# Patient Record
Sex: MALE | Race: BLACK OR AFRICAN AMERICAN | Employment: UNEMPLOYED | URBAN - METROPOLITAN AREA
[De-identification: names, ages, dates, MRNs, and addresses within clinical notes are randomized per-mention and may not be internally consistent; named-entity substitution may affect disease eponyms.]

---

## 2017-01-11 DIAGNOSIS — J00 ACUTE NASOPHARYNGITIS: Primary | ICD-10-CM

## 2017-01-11 RX ORDER — GUAIFENESIN/DEXTROMETHORPHAN 100-10MG/5
5 SYRUP ORAL EVERY 4 HOURS PRN
Qty: 560 ML | Refills: 0 | Status: SHIPPED
Start: 2017-01-11 | End: 2017-05-25

## 2017-01-16 DIAGNOSIS — J45.20 MILD INTERMITTENT ASTHMA WITHOUT COMPLICATION: Primary | ICD-10-CM

## 2017-01-17 RX ORDER — BUDESONIDE AND FORMOTEROL FUMARATE DIHYDRATE 160; 4.5 UG/1; UG/1
2 AEROSOL RESPIRATORY (INHALATION) 2 TIMES DAILY
Qty: 3 INHALER | Refills: 1 | Status: SHIPPED
Start: 2017-01-17 | End: 2017-05-25

## 2017-02-16 ENCOUNTER — MEDICAL CORRESPONDENCE (OUTPATIENT)
Dept: HEALTH INFORMATION MANAGEMENT | Facility: CLINIC | Age: 57
End: 2017-02-16

## 2017-04-27 ENCOUNTER — TELEPHONE (OUTPATIENT)
Dept: FAMILY MEDICINE | Facility: CLINIC | Age: 57
End: 2017-04-27

## 2017-05-25 ENCOUNTER — OFFICE VISIT (OUTPATIENT)
Dept: FAMILY MEDICINE | Facility: CLINIC | Age: 57
End: 2017-05-25

## 2017-05-25 VITALS
BODY MASS INDEX: 18.58 KG/M2 | HEIGHT: 70 IN | DIASTOLIC BLOOD PRESSURE: 87 MMHG | HEART RATE: 82 BPM | TEMPERATURE: 98.2 F | OXYGEN SATURATION: 100 % | SYSTOLIC BLOOD PRESSURE: 139 MMHG | WEIGHT: 129.8 LBS

## 2017-05-25 DIAGNOSIS — J30.2 SEASONAL ALLERGIC RHINITIS, UNSPECIFIED ALLERGIC RHINITIS TRIGGER: ICD-10-CM

## 2017-05-25 DIAGNOSIS — J45.20 MILD INTERMITTENT ASTHMA WITHOUT COMPLICATION: ICD-10-CM

## 2017-05-25 DIAGNOSIS — Z86.73 HISTORY OF EMBOLIC STROKE: Primary | ICD-10-CM

## 2017-05-25 LAB
% GRANULOCYTES: 66.5 %G (ref 40–75)
GRANULOCYTES #: 5.1 K/UL (ref 1.6–8.3)
HBA1C MFR BLD: 5.4 % (ref 4.1–5.7)
HCT VFR BLD AUTO: 40 % (ref 40–53)
HEMOGLOBIN: 12.2 G/DL (ref 13.3–17.7)
INR PPP: 0.9
LYMPHOCYTES # BLD AUTO: 2 K/UL (ref 0.8–5.3)
LYMPHOCYTES NFR BLD AUTO: 26.1 %L (ref 20–48)
MCH RBC QN AUTO: 28.3 PG (ref 26.5–35)
MCHC RBC AUTO-ENTMCNC: 30.5 G/DL (ref 32–36)
MCV RBC AUTO: 92.9 FL (ref 78–100)
MID #: 0.6 K/UL (ref 0–2.2)
MID %: 7.4 %M (ref 0–20)
PLATELET # BLD AUTO: 155 K/UL (ref 150–450)
RBC # BLD AUTO: 4.3 M/UL (ref 4.4–5.9)
WBC # BLD AUTO: 7.7 K/UL (ref 4–11)

## 2017-05-25 RX ORDER — ALBUTEROL SULFATE 90 UG/1
2 AEROSOL, METERED RESPIRATORY (INHALATION) EVERY 6 HOURS PRN
Qty: 3 INHALER | Refills: 1 | Status: SHIPPED | OUTPATIENT
Start: 2017-05-25 | End: 2017-07-20

## 2017-05-25 RX ORDER — CETIRIZINE HYDROCHLORIDE 10 MG/1
10 TABLET ORAL EVERY EVENING
Qty: 30 TABLET | Refills: 1 | Status: SHIPPED | OUTPATIENT
Start: 2017-05-25 | End: 2017-09-20

## 2017-05-25 ASSESSMENT — ANXIETY QUESTIONNAIRES
IF YOU CHECKED OFF ANY PROBLEMS ON THIS QUESTIONNAIRE, HOW DIFFICULT HAVE THESE PROBLEMS MADE IT FOR YOU TO DO YOUR WORK, TAKE CARE OF THINGS AT HOME, OR GET ALONG WITH OTHER PEOPLE: VERY DIFFICULT
1. FEELING NERVOUS, ANXIOUS, OR ON EDGE: NEARLY EVERY DAY
3. WORRYING TOO MUCH ABOUT DIFFERENT THINGS: NEARLY EVERY DAY
GAD7 TOTAL SCORE: 15
7. FEELING AFRAID AS IF SOMETHING AWFUL MIGHT HAPPEN: NEARLY EVERY DAY
5. BEING SO RESTLESS THAT IT IS HARD TO SIT STILL: NOT AT ALL
6. BECOMING EASILY ANNOYED OR IRRITABLE: NEARLY EVERY DAY
2. NOT BEING ABLE TO STOP OR CONTROL WORRYING: NOT AT ALL

## 2017-05-25 ASSESSMENT — PATIENT HEALTH QUESTIONNAIRE - PHQ9: 5. POOR APPETITE OR OVEREATING: NEARLY EVERY DAY

## 2017-05-25 NOTE — LETTER
June 2, 2017      Peter Perez  [address]  [address]  [facility] [place] [address]    Please see below for your test results.    Resulted Orders   CBC with Diff Plt ([facility])   Result Value Ref Range    WBC 7.7 4.0 - 11.0 K/uL    Lymphocytes # 2.0 0.8 - 5.3 K/uL    % Lymphocytes 26.1 20.0 - 48.0 %L    Mid # 0.6 0.0 - 2.2 K/uL    Mid % 7.4 0.0 - 20.0 %M    GRANULOCYTES # 5.1 1.6 - 8.3 K/uL    % Granulocytes 66.5 40.0 - 75.0 %G    RBC 4.3 (L) 4.4 - 5.9 M/uL    Hemoglobin 12.2 (L) 13.3 - 17.7 g/dL    Hematocrit 40.0 40.0 - 53.0 %    MCV 92.9 78.0 - 100.0 fL    MCH 28.3 26.5 - 35.0    MCHC 30.5 (L) 32.0 - 36.0 g/dL    Platelets 155.0 150.0 - 450.0 K/uL   INR ([facility])   Result Value Ref Range    INR 0.9       Comment:      Adult Normal Range: 0.90 - 1.10  Therapeutic Range: 2.0 - 3.5     Hemoglobin A1c ([facility])   Result Value Ref Range    Hemoglobin A1C 5.4 4.1 - 5.7 %                                 Mr. Perez,    We missed you at the follow-up appoint you had scheduled on June 2nd. I wanted to let you know that your screening for diabetes is negative with a Hemoglobin A1c of 5.4. Your hemoglobin level is still slightly lower than expected for your age but it is improved from what it was one year ago. The INR test was normal, which was expected because you have not been taking any blood thinning medications recently.     Because you missed your appointment, we did not get the rest of your labs collected which include testing your lipids and your kidney function. Please stop by the lab at your earliest convenience to have these labs rechecked and make an appointment for a follow-up visit to discuss ongoing treatment and prevention for your chronic medical conditions.    Thank you!    Charissa Cedeno MD  If you have any questions, please call the clinic to make an appointment.    Sincerely,    Charissa Cedeno MD

## 2017-05-25 NOTE — PROGRESS NOTES
Preceptor attestation:  Patient seen and discussed with the resident. Assessment and plan reviewed with resident and agreed upon.  Supervising physician: Asael Slater  [facility]

## 2017-05-25 NOTE — MR AVS SNAPSHOT
After Visit Summary   2017    Peter Perez    MRN: [id number]           Patient Information     Date Of Birth          1960        Visit Information        Provider Department      2017 2:10 PM Charissa Cedeno MD [facility]        Today's Diagnoses     History of embolic stroke    -  1    Seasonal allergic rhinitis, unspecified allergic rhinitis trigger        Mild intermittent asthma without complication           Follow-ups after your visit        Who to contact     Please call your clinic at [phone] to:    Ask questions about your health    Make or cancel appointments    Discuss your medicines    Learn about your test results    Speak to your doctor   If you have compliments or concerns about an experience at your clinic, or if you wish to file a complaint, please contact [facility] Patient Relations at [phone] or email us at [email or web address]         Additional Information About Your Visit        MyChart Information     ActiveTrak is an electronic gateway that provides easy, online access to your medical records. With ActiveTrak, you can request a clinic appointment, read your test results, renew a prescription or communicate with your care team.     To sign up for [employer] visit the website at [email or web address]   You will be asked to enter the access code listed below, as well as some personal information. Please follow the directions to create your username and password.     Your access code is: [email or web address]  Expires: 2017  2:25 PM     Your access code will  in 90 days. If you need help or a new code, please contact your [facility] or call [phone] for assistance.        Care EveryWhere ID     This is your Care EveryWhere ID. This could be used by other organizations to access your [place] medical records  [id number]        Your Vitals Were     Pulse Temperature Height  "Pulse Oximetry BMI (Body Mass Index)       82 98.2  F (36.8  C) (Oral) 5' 9.69\" (177 cm) 100% 18.79 kg/m2        Blood Pressure from Last 3 Encounters:   05/25/17 139/87   10/26/16 137/81   07/26/16 137/89    Weight from Last 3 Encounters:   05/25/17 129 lb 12.8 oz (58.9 kg)   10/26/16 132 lb 9.6 oz (60.1 kg)   07/26/16 130 lb (59 kg)              We Performed the Following     CBC with Diff Plt ([facility])     Comprehensive Metabolic ([facility]) - Results > 1 hr     Hemoglobin A1c ([facility])     INR ([facility])     Lipid Panel ([facility]) - Results < 1 hr          Where to get your medicines      These medications were sent to [address] [address]     Phone:  [phone]     albuterol 108 (90 BASE) MCG/ACT Inhaler    cetirizine 10 MG tablet    ketotifen 0.025 % Soln ophthalmic solution          Primary Care Provider Office Phone # Fax #    Rony Reyes [phone]       [facility] [address] [facility] [address]        Thank you!     Thank you for choosing [facility]  for your care. Our goal is always to provide you with excellent care. Hearing back from our patients is one way we can continue to improve our services. Please take a few minutes to complete the written survey that you may receive in the mail after your visit with us. Thank you!             Your Updated Medication List - Protect others around you: Learn how to safely use, store and throw away your medicines at www.disposemymeds.org.          This list is accurate as of: 5/25/17  2:26 PM.  Always use your most recent med list.                   Brand Name Dispense Instructions for use    albuterol 108 (90 BASE) MCG/ACT Inhaler    PROAIR HFA/PROVENTIL HFA/VENTOLIN HFA    3 Inhaler    Inhale 2 puffs into the lungs every 6 hours as needed for shortness of breath / dyspnea or wheezing       cetirizine 10 MG tablet    zyrTEC    30 tablet    Take 1 tablet (10 mg) " by mouth every evening       ketotifen 0.025 % Soln ophthalmic solution    ZADITOR    1 Bottle    Place 2 drops into both eyes every 12 hours

## 2017-05-25 NOTE — PROGRESS NOTES
"       SUBJECTIVE        Mr. Perez is a 56-year-old male presenting for what he reports as a recheck on his blood pressure as well as itching in his eyes.    He reports that he was called by the clinic to come into did get his blood pressure rechecked.  He denies any problems with hypertension at this time.  He has not been taking any medications.  He reports that he previously was taking lisinopril but stopped because he was having nose bleeding.  Review of the records show that he had upper lip swelling and so lisinopril was stopped in October 2016.  He denies dizziness, chest pain, abdominal pain. Reports breathing problems which include \"breathing hard\" when he goes up stairs or walks 3-4 blocks or more. Has not noticed any wheezing.  He has not had any significant changes in his weight.  He has not been checking his blood pressure at home.    Patient reports that his eyes are itching and burning all the time. It has been going on for many weeks now. Feels that they have been having more watering and clear discharge. No thick secretions or problems opening his eyes in the morning due to crusting.  He used to have eyedrops for this and they were very helpful.  He also reports that he had allergy pills and this helped with his eye symptoms as well as with some mild cough symptoms.  He has not been buying any medicines over-the-counter recently.    While reviewing the patient's record, it came to the provider's attention that he has a history of a cerebral embolism.  He was previously on warfarin and had been recommended that he continue this indefinitely.  When asked, patient does not remember the details of this episode or the medications that he was taking for it.  He denies headaches.  He has not had any focal weakness.  He is unsure of what medications he should have been taking but reports he stopped when he ran out.    PMH, Medications and Allergies were reviewed and updated as needed.          OBJECTIVE " "    Vitals:    05/25/17 1401   BP: 139/87   BP Location: Right arm   Patient Position: Chair   Pulse: 82   Temp: 98.2  F (36.8  C)   TempSrc: Oral   SpO2: 100%   Weight: 129 lb 12.8 oz (58.9 kg)   Height: 5' 9.69\" (177 cm)     Body mass index is 18.79 kg/(m^2).    General: calm appearing male in no apparent distress  HEENT: Normocephalic, atraumatic, PERRL, Sclera anicteric and not injected, mucous membranes moist and intact without tonsillar injection, no cervical adenopathy  Cardio: RRR, no murmurs, rubs or gallop  Respiratory: Clear breath sounds bilaterally, no wheezes or rhonchi  Abdomen: Soft, non-tender, no masses or organomegaly  Extremities: No edema, no rashes or skin changes    ASSESSMENT AND PLAN     Mr. Perez is a 56-year-old male with a history of cerebral embolism with cerebral infarction, moderate persistent asthma, hypertension, and seasonal allergies who presents for hypertension.  Patient reports that he was called by the clinic to have follow-up for his hypertension.  He has not been taking any medications for any of his chronic medical problems including warfarin and aspirin which were recommended to be used indefinitely due to embolism.    1. Seasonal allergic rhinitis, unspecified allergic rhinitis trigger: Patient's biggest concern today is that he has itchy eyes bilaterally and has had allergy medications in the past.  He reports he has been out of these for some time.  In the past he has used ophthalmic drops which were extremely helpful but he has run out and was unable to get a refill.  We will refill these today.  - cetirizine (ZYRTEC) 10 MG tablet; Take 1 tablet (10 mg) by mouth every evening  Dispense: 30 tablet; Refill: 1  - ketotifen (ZADITOR) 0.025 % SOLN ophthalmic solution; Place 2 drops into both eyes every 12 hours  Dispense: 1 Bottle; Refill: 3    2. Mild intermittent asthma without complication: Patient requesting refill today. No breathing concerns currently.   - albuterol " (PROAIR HFA/PROVENTIL HFA/VENTOLIN HFA) 108 (90 BASE) MCG/ACT Inhaler; Inhale 2 puffs into the lungs every 6 hours as needed for shortness of breath / dyspnea or wheezing  Dispense: 3 Inhaler; Refill: 1    3. History of embolic stroke: Was unable to completely review patient's chart, however did review some of his previous notes including for prior cerebral infarction.  Patient has not been taking his warfarin and has not been taking aspirin.  Denies any headaches.  Is unsure of previous bleeding or clotting and was not aware of details of his previous infarction.  Will check labs today and have patient follow-up within the next week for results and to discuss restarting important prophylactic medications.  - CBC with Diff Plt ([facility])  - INR ([facility])  - Hemoglobin A1c ([facility])  - Lipid Panel ([facility]); Future  - Comprehensive Metabolic Panel ([place]); Future    Charissa Cedeno MD - PGY-3  [facility]    Patient seen and plan of care discussed with preceptor, Dr. Slater

## 2017-05-26 ASSESSMENT — PATIENT HEALTH QUESTIONNAIRE - PHQ9: SUM OF ALL RESPONSES TO PHQ QUESTIONS 1-9: 10

## 2017-05-26 ASSESSMENT — ANXIETY QUESTIONNAIRES: GAD7 TOTAL SCORE: 15

## 2017-07-20 ENCOUNTER — OFFICE VISIT (OUTPATIENT)
Dept: FAMILY MEDICINE | Facility: CLINIC | Age: 57
End: 2017-07-20

## 2017-07-20 VITALS
TEMPERATURE: 98.2 F | WEIGHT: 130.4 LBS | DIASTOLIC BLOOD PRESSURE: 87 MMHG | HEIGHT: 69 IN | SYSTOLIC BLOOD PRESSURE: 138 MMHG | HEART RATE: 87 BPM | BODY MASS INDEX: 19.31 KG/M2

## 2017-07-20 DIAGNOSIS — M21.512: ICD-10-CM

## 2017-07-20 DIAGNOSIS — E78.2 MIXED HYPERLIPIDEMIA: ICD-10-CM

## 2017-07-20 DIAGNOSIS — D64.9 ANEMIA, UNSPECIFIED TYPE: ICD-10-CM

## 2017-07-20 DIAGNOSIS — Z87.891 SMOKING HISTORY: ICD-10-CM

## 2017-07-20 DIAGNOSIS — J45.20 MILD INTERMITTENT ASTHMA WITHOUT COMPLICATION: ICD-10-CM

## 2017-07-20 DIAGNOSIS — I63.40 CEREBRAL INFARCTION DUE TO EMBOLISM OF CEREBRAL ARTERY (H): Primary | ICD-10-CM

## 2017-07-20 DIAGNOSIS — R63.0 POOR APPETITE: ICD-10-CM

## 2017-07-20 LAB
ALBUMIN SERPL-MCNC: 4.2 MG/DL (ref 3.3–4.9)
ALP SERPL-CCNC: 71.7 U/L (ref 40–150)
ALT SERPL-CCNC: <15 U/L (ref 0–45)
AST SERPL-CCNC: 21.2 U/L (ref 0–55)
BILIRUB SERPL-MCNC: 0.3 MG/DL (ref 0.2–1.3)
BILIRUBIN UR: ABNORMAL
BLOOD UR: NEGATIVE
BUN SERPL-MCNC: 14.9 MG/DL (ref 7–21)
CALCIUM SERPL-MCNC: 9.9 MG/DL (ref 8.5–10.1)
CHLORIDE SERPLBLD-SCNC: 109.8 MMOL/L (ref 98–110)
CHOLEST SERPL-MCNC: 231.6 MG/DL (ref 0–200)
CHOLEST/HDLC SERPL: 2.7 {RATIO} (ref 0–5)
CO2 SERPL-SCNC: 20.8 MMOL/L (ref 20–32)
CREAT SERPL-MCNC: 1.2 MG/DL (ref 0.7–1.3)
GFR SERPL CREATININE-BSD FRML MDRD: 66.3 ML/MIN/1.7 M2
GLUCOSE SERPL-MCNC: 103.2 MG'DL (ref 70–99)
GLUCOSE URINE: NEGATIVE
HCT VFR BLD AUTO: 42.7 % (ref 40–53)
HDLC SERPL-MCNC: 86.2 MG/DL
HEMOGLOBIN: 12.9 G/DL (ref 13.3–17.7)
KETONES UR QL: ABNORMAL
LDLC SERPL CALC-MCNC: 128 MG/DL (ref 0–129)
LEUKOCYTE ESTERASE UR: ABNORMAL
MCH RBC QN AUTO: 27.7 PG (ref 26.5–35)
MCHC RBC AUTO-ENTMCNC: 30.2 G/DL (ref 32–36)
MCV RBC AUTO: 91.6 FL (ref 78–100)
NITRITE UR QL STRIP: NEGATIVE
PH UR STRIP: 5.5 [PH] (ref 5–7)
PLATELET # BLD AUTO: 499 K/UL (ref 150–450)
POTASSIUM SERPL-SCNC: 5.1 MMOL/DL (ref 3.2–4.6)
PROT SERPL-MCNC: 7.7 G/DL (ref 6.8–8.8)
PROTEIN UR: ABNORMAL
RBC # BLD AUTO: 4.7 M/UL (ref 4.4–5.9)
SODIUM SERPL-SCNC: 137.3 MMOL/L (ref 132–142)
SP GR UR STRIP: 1.02
TRIGL SERPL-MCNC: 89 MG/DL (ref 0–150)
TSH SERPL DL<=0.05 MIU/L-ACNC: 1.31 UIU/ML (ref 0.3–5)
UROBILINOGEN UR STRIP-ACNC: ABNORMAL
VIT B12 SERPL-MCNC: 804 PG/ML (ref 213–816)
VLDL CHOLESTEROL: 17.8 MG/DL (ref 7–32)
WBC # BLD AUTO: 6.3 K/UL (ref 4–11)

## 2017-07-20 RX ORDER — MULTIPLE VITAMINS W/ MINERALS TAB 9MG-400MCG
1 TAB ORAL DAILY
Qty: 100 TABLET | Refills: 3 | Status: SHIPPED | OUTPATIENT
Start: 2017-07-20 | End: 2017-09-20

## 2017-07-20 RX ORDER — ATORVASTATIN CALCIUM 40 MG/1
40 TABLET, FILM COATED ORAL DAILY
Qty: 31 TABLET | Refills: 11 | Status: SHIPPED | OUTPATIENT
Start: 2017-07-20 | End: 2017-09-20

## 2017-07-20 RX ORDER — ALBUTEROL SULFATE 90 UG/1
2 AEROSOL, METERED RESPIRATORY (INHALATION) EVERY 6 HOURS PRN
Qty: 3 INHALER | Refills: 1 | Status: SHIPPED | OUTPATIENT
Start: 2017-07-20 | End: 2017-09-20
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
## 2017-07-20 NOTE — PATIENT INSTRUCTIONS
Please return for a physical to discuss colonoscopy and other health maintenance.  We can also discuss your knee pain and a cortisone injection at that time    Please  and start:  1) aspirin  2) a multivitamin  3) the cholesterol medication (Lipitor)  4) your inhaler

## 2017-07-20 NOTE — MR AVS SNAPSHOT
After Visit Summary   7/20/2017    Peter Perez    MRN: [id number]           Patient Information     Date Of Birth          1960        Visit Information        Provider Department      7/20/2017 9:00 AM Evens Eldridge MD [facility]        Today's Diagnoses     Cerebral infarction due to embolism of cerebral artery (H)    -  1    Mixed hyperlipidemia        Smoking history        Poor appetite        Anemia, unspecified type        Mild intermittent asthma without complication        Acquired claw hand, left          Care Instructions    Please return for a physical to discuss colonoscopy and other health maintenance.  We can also discuss your knee pain and a cortisone injection at that time    Please  and start:  1) aspirin,  2) a multivitamin,   3) the cholesterol medication (Lipitor)  4) your inhaler.          Follow-ups after your visit        Who to contact     Please call your clinic at [phone] to:    Ask questions about your health    Make or cancel appointments    Discuss your medicines    Learn about your test results    Speak to your doctor   If you have compliments or concerns about an experience at your clinic, or if you wish to file a complaint, please contact [facility] Patient Relations at [phone] or email us at [email or web address]         Additional Information About Your Visit        MyChart Information     Anokion SAt is an electronic gateway that provides easy, online access to your medical records. With Mytopia, you can request a clinic appointment, read your test results, renew a prescription or communicate with your care team.     To sign up for Anokion SAt visit the website at [email or web address]   You will be asked to enter the access code listed below, as well as some personal information. Please follow the directions to create your username and password.     Your access code is: [email or web address]  Expires: 8/23/2017   "2:25 PM     Your access code will  in 90 days. If you need help or a new code, please contact your [facility] or call [phone] for assistance.        Care EveryWhere ID     This is your Care EveryWhere ID. This could be used by other organizations to access your [place] medical records  [id number]        Your Vitals Were     Pulse Temperature Height BMI (Body Mass Index)          87 98.2  F (36.8  C) (Oral) 5' 9\" (175.3 cm) 19.26 kg/m2         Blood Pressure from Last 3 Encounters:   17 138/87   17 139/87   10/26/16 137/81    Weight from Last 3 Encounters:   17 130 lb 6.4 oz (59.1 kg)   17 129 lb 12.8 oz (58.9 kg)   10/26/16 132 lb 9.6 oz (60.1 kg)              We Performed the Following     CBC with Plt ([facility])     Comprehensive Metabolic Panel ([facility])     Drug Screen Urine ([facility])     Lipid Panel ([facility])     Thyroid [place] ([facility])     Urinalysis([facility])     Vitamin B12 ([facility])          Today's Medication Changes          These changes are accurate as of: 17  9:50 AM.  If you have any questions, ask your nurse or doctor.               Start taking these medicines.        Dose/Directions    aspirin 81 MG EC tablet   Used for:  Cerebral infarction due to embolism of cerebral artery (H)   Started by:  Evens Eldridge MD        Dose:  81 mg   Take 1 tablet (81 mg) by mouth daily   Quantity:  90 tablet   Refills:  3       atorvastatin 40 MG tablet   Commonly known as:  LIPITOR   Used for:  Cerebral infarction due to embolism of cerebral artery (H)   Started by:  Evens Eldridge MD        Dose:  40 mg   Take 1 tablet (40 mg) by mouth daily   Quantity:  31 tablet   Refills:  11       multivitamin, therapeutic with minerals Tabs tablet   Used for:  Poor appetite   Started by:  Evens Eldridge MD        Dose:  1 tablet   Take 1 tablet by mouth daily   Quantity:  100 tablet   Refills:  3            Where to get your medicines      These " medications were sent to [facility]/pharmacy #[id number] - [place], [place] - [address] [facility]  [address], [place] [address]    Hours:  24-hours Phone:  [phone]     albuterol 108 (90 BASE) MCG/ACT Inhaler    aspirin 81 MG EC tablet    atorvastatin 40 MG tablet    multivitamin, therapeutic with minerals Tabs tablet                Primary Care Provider Office Phone # Fax #    Julianna Ole Moraes [phone]       [facility] [address] [place] [address]        Equal Access to Services     [facility]: Hadii aad ku hadasho Soomaali, waaxda luqadaha, qaybta kaalmada adeegyada, waxquirino anderson hayzi sosa . So [facility] [phone].    ATENCIÓN: Si habla español, tiene a lugo disposición servicios gratuitos de asistencia lingüística. [facility] [phone].    We comply with applicable federal civil rights laws and [place] laws. We do not discriminate on the basis of race, color, national origin, age, disability sex, sexual orientation or gender identity.            Thank you!     Thank you for choosing [facility]  for your care. Our goal is always to provide you with excellent care. Hearing back from our patients is one way we can continue to improve our services. Please take a few minutes to complete the written survey that you may receive in the mail after your visit with us. Thank you!             Your Updated Medication List - Protect others around you: Learn how to safely use, store and throw away your medicines at www.disposemymeds.org.          This list is accurate as of: 7/20/17  9:50 AM.  Always use your most recent med list.                   Brand Name Dispense Instructions for use Diagnosis    albuterol 108 (90 BASE) MCG/ACT Inhaler    PROAIR HFA/PROVENTIL HFA/VENTOLIN HFA    3 Inhaler    Inhale 2 puffs into the lungs every 6 hours as needed for shortness of breath / dyspnea or wheezing    Mild intermittent asthma without complication       aspirin  81 MG EC tablet     90 tablet    Take 1 tablet (81 mg) by mouth daily    Cerebral infarction due to embolism of cerebral artery (H)       atorvastatin 40 MG tablet    LIPITOR    31 tablet    Take 1 tablet (40 mg) by mouth daily    Cerebral infarction due to embolism of cerebral artery (H)       cetirizine 10 MG tablet    zyrTEC    30 tablet    Take 1 tablet (10 mg) by mouth every evening    Seasonal allergic rhinitis, unspecified allergic rhinitis trigger       ketotifen 0.025 % Soln ophthalmic solution    ZADITOR    1 Bottle    Place 2 drops into both eyes every 12 hours    Seasonal allergic rhinitis, unspecified allergic rhinitis trigger       multivitamin, therapeutic with minerals Tabs tablet     100 tablet    Take 1 tablet by mouth daily    Poor appetite

## 2017-07-20 NOTE — LETTER
July 31, 2017      Peter Perez  [address] [place] [address]        Dear Peter,    Please see below for your test results.    Sadie Green - the main abnormality with your labs was your cholesterol - so it is good that we re-started the cholesterol lowering medication.  Otherwise you did not have a urine infection.  As we discussed, follow up with Dr Moraes and consider having a cortisone shot in your knee - regards, Dr CATRACHITA Eldridge     Resulted Orders   Lipid Panel ([facility])   Result Value Ref Range    Cholesterol 231.6 (H) 0.0 - 200.0 mg/dL    Cholesterol/HDL Ratio 2.7 0.0 - 5.0    HDL Cholesterol 86.2 >40.0 mg/dL    LDL Cholesterol Calculated 128 0 - 129 mg/dL    Triglycerides 89.0 0.0 - 150.0 mg/dL    VLDL Cholesterol 17.8 7.0 - 32.0 mg/dL   Comprehensive Metabolic Panel ([place])   Result Value Ref Range    Albumin 4.2 3.3 - 4.9 mg/dL    Alkaline Phosphatase 71.7 40.0 - 150.0 U/L    ALT <15 0.0 - 45.0 U/L    AST 21.2 0.0 - 55.0 U/L    Bilirubin Total 0.3 0.2 - 1.3 mg/dL    Urea Nitrogen 14.9 7.0 - 21.0 mg/dL    Calcium 9.9 8.5 - 10.1 mg/dL    Chloride 109.8 98.0 - 110.0 mmol/L    Carbon Dioxide 20.8 20.0 - 32.0 mmol/L    Creatinine 1.2 0.7 - 1.3 mg/dL    Glucose 103.2 (H) 70.0 - 99.0 mg'dL    Potassium 5.1 (H) 3.2 - 4.6 mmol/dL    Sodium 137.3 132.0 - 142.0 mmol/L    Protein Total 7.7 6.8 - 8.8 g/dL    GFR Estimate 66.3 >60.0 mL/min/1.7 m2    GFR Estimate If Black 80.3 >60.0 mL/min/1.7 m2   CBC with Plt ([facility])   Result Value Ref Range    WBC 6.3 4.0 - 11.0 K/uL    RBC 4.7 4.4 - 5.9 M/uL    Hemoglobin 12.9 (L) 13.3 - 17.7 g/dL    Hematocrit 42.7 40.0 - 53.0 %    MCV 91.6 78.0 - 100.0 fL    MCH 27.7 26.5 - 35.0    MCHC 30.2 (L) 32.0 - 36.0 g/dL    Platelets 499.0 (H) 150.0 - 450.0 K/uL   Vitamin B12 ([facility])   Result Value Ref Range    Vitamin B12 804 213 - 816 pg/mL    Narrative    Test performed by:  [facility] [address] [place] [address]   Urinalysis([facility])   Result Value Ref  Range    Specific Gravity Urine 1.025 1.005 - 1.030    pH Urine 5.5 4.5 - 8.0    Leukocyte Esterase UR Trace (A) NEGATIVE    Nitrite Urine Negative NEGATIVE    Protein UR 1+ (A) NEGATIVE    Glucose Urine Negative NEGATIVE    Ketones Urine 1+ (A) NEGATIVE    Urobilinogen mg/dL 1.0 E.U./dL (A) 0.2 E.U./dL    Bilirubin UR 2+ (A) NEGATIVE    Blood UR Negative NEGATIVE   Thyroid [employer] ([facility])   Result Value Ref Range    TSH 1.31 0.30 - 5.00 uIU/mL    Narrative    Test performed by:  [facility]  [address] [place] [address]   Urine Culture ([facility])   Result Value Ref Range    Culture SEE RESULTS BELOW       Comment:      CULTURE, URINE   SOURCE: Urine, Random   CULTURE RESULTS:    No Growth      Narrative    Test performed by:  [facility] [address] [place] [address]   Drug Abuse 01+  Urine ([facility])   Result Value Ref Range    Amphetamines, Urine Screen Negative Screen Negative    Benzodiazepines Screen Negative Screen Negative    Opiates Screen Negative Screen Negative    Phencyclidine Screen Negative Screen Negative    THC Metabolite, Ur. (A) Screen Negative     Screen Positive (Confirmation available on request)    Barbiturates Screen Negative Screen Negative    Cocaine Metabolite, Ur. Screen Negative Screen Negative    Methadone Screen Negative Screen Negative    Oxycodone Screen Negative Screen Negative    Creatinine, Urine 360.5 mg/dL    Narrative    Test performed by:  [facility]  [address] [place] [address]  Drug                           Screening Threshold  Amphetamines                    1000 ng/mL  Benzodiazepine                   200 ng/mL  Opiates                          300 ng/mL  Phencyclidine                     25 ng/mL  THC Metabolite                    50 ng/mL  Barbiturates                     200 ng/mL  Cocaine Metabolite               150 ng/mL  Methadone                        300 ng/mL  Oxycodone                         100 ng/mL  Screening results are to be used only for medical purposes.  Unconfirmed screening results are not to be used for non-  medical purposes.       If you have any questions, please call the clinic to make an appointment.    Sincerely,    Evens Eldridge MD

## 2017-07-21 LAB
AMPHETAMINES, URINE: ABNORMAL
BARBITURATES UR CFM-MCNC: ABNORMAL NG/ML
BENZODIAZ UR-MCNC: ABNORMAL UG/L
CANNABINOIDS SERPL QL: ABNORMAL
COCAINE METABOLITE, UR.: ABNORMAL
CREAT UR-MCNC: 360.5 MG/DL
METHADONE: ABNORMAL
OPIATES UR CFM-MCNC: ABNORMAL NG/ML
OXYCODONE: ABNORMAL
PCP UR CFM-MCNC: ABNORMAL NG/ML

## 2017-07-23 LAB — CULTURE: NORMAL

## 2017-09-20 ENCOUNTER — OFFICE VISIT (OUTPATIENT)
Dept: FAMILY MEDICINE | Facility: CLINIC | Age: 57
End: 2017-09-20

## 2017-09-20 VITALS
HEART RATE: 91 BPM | DIASTOLIC BLOOD PRESSURE: 80 MMHG | BODY MASS INDEX: 19.4 KG/M2 | WEIGHT: 131.4 LBS | TEMPERATURE: 97.9 F | SYSTOLIC BLOOD PRESSURE: 123 MMHG

## 2017-09-20 DIAGNOSIS — J45.909 MODERATE ASTHMA: ICD-10-CM

## 2017-09-20 DIAGNOSIS — Z86.69 HISTORY OF ITCHING OF EYE: ICD-10-CM

## 2017-09-20 DIAGNOSIS — R63.0 POOR APPETITE: ICD-10-CM

## 2017-09-20 DIAGNOSIS — I63.40 CEREBRAL INFARCTION DUE TO EMBOLISM OF CEREBRAL ARTERY (H): ICD-10-CM

## 2017-09-20 RX ORDER — MULTIPLE VITAMINS W/ MINERALS TAB 9MG-400MCG
1 TAB ORAL DAILY
Qty: 100 TABLET | Refills: 3 | Status: SHIPPED | OUTPATIENT
Start: 2017-09-20 | End: 2019-06-11

## 2017-09-20 RX ORDER — CETIRIZINE HYDROCHLORIDE 10 MG/1
10 TABLET ORAL EVERY EVENING
Qty: 30 TABLET | Refills: 1 | Status: SHIPPED | OUTPATIENT
Start: 2017-09-20 | End: 2019-06-11

## 2017-09-20 RX ORDER — ALBUTEROL SULFATE 90 UG/1
2 AEROSOL, METERED RESPIRATORY (INHALATION) EVERY 6 HOURS PRN
Qty: 3 INHALER | Refills: 1 | Status: SHIPPED | OUTPATIENT
Start: 2017-09-20 | End: 2017-12-04

## 2017-09-20 RX ORDER — FLUTICASONE PROPIONATE 50 MCG
1-2 SPRAY, SUSPENSION (ML) NASAL DAILY
Qty: 3 BOTTLE | Refills: 1 | Status: SHIPPED | OUTPATIENT
Start: 2017-09-20 | End: 2019-06-11

## 2017-09-20 RX ORDER — ATORVASTATIN CALCIUM 40 MG/1
40 TABLET, FILM COATED ORAL DAILY
Qty: 31 TABLET | Refills: 11 | Status: SHIPPED | OUTPATIENT
Start: 2017-09-20 | End: 2019-06-11

## 2017-09-20 NOTE — MR AVS SNAPSHOT
After Visit Summary   9/20/2017    Peter Perez    MRN: [id number]           Patient Information     Date Of Birth          1960        Visit Information        Provider Department      9/20/2017 10:40 AM Julianna Moraes MD [facility]        Today's Diagnoses     Moderate intermittent asthma, with acute exacerbation    -  1    Cerebral infarction due to embolism of cerebral artery (H)        Mild intermittent asthma without complication        Poor appetite        History of itching of eye          Care Instructions    Refills given today    Continue cetirizine (ZYRTEC) 10 MG tablet; Take 1 tablet (10 mg) by mouth every evening  Dispense: 30 tablet; Refill: 1 - take this before bed if able  Start beclomethasone (QVAR) 80 MCG/ACT Inhaler; Inhale 1 puff into the lungs 2 times daily  Dispense: 1 Inhaler; Refill: 1  Start fluticasone (FLONASE) 50 MCG/ACT spray; Spray 1-2 sprays into both nostrils daily  Dispense: 3 Bottle; Refill: 1    Follow-up in 2-4 weeks for asthma check or sooner if symptoms are not improving. Go to ED for severe shortness of breath, chest pain, etc.              Follow-ups after your visit        Who to contact     Please call your clinic at [phone] to:    Ask questions about your health    Make or cancel appointments    Discuss your medicines    Learn about your test results    Speak to your doctor   If you have compliments or concerns about an experience at your clinic, or if you wish to file a complaint, please contact [facility] Patient Relations at [phone] or email us at [email or web address]         Additional Information About Your Visit        DaoliCloudharNearway Information     Taptu is an electronic gateway that provides easy, online access to your medical records. With Taptu, you can request a clinic appointment, read your test results, renew a prescription or communicate with your care team.     To sign up for Taptu  visit the website at [email or web address]   You will be asked to enter the access code listed below, as well as some personal information. Please follow the directions to create your username and password.     Your access code is: [email or web address]  Expires: 2017 11:18 AM     Your access code will  in 90 days. If you need help or a new code, please contact your [facility] or call [phone] for assistance.        Care EveryWhere ID     This is your Care EveryWhere ID. This could be used by other organizations to access your [place] medical records  [id number]        Your Vitals Were     Pulse Temperature BMI (Body Mass Index)             91 97.9  F (36.6  C) (Oral) 19.4 kg/m2          Blood Pressure from Last 3 Encounters:   17 123/80   17 138/87   17 139/87    Weight from Last 3 Encounters:   17 131 lb 6.4 oz (59.6 kg)   17 130 lb 6.4 oz (59.1 kg)   17 129 lb 12.8 oz (58.9 kg)              Today, you had the following     No orders found for display         Today's Medication Changes          These changes are accurate as of: 17 11:18 AM.  If you have any questions, ask your nurse or doctor.               Start taking these medicines.        Dose/Directions    beclomethasone 80 MCG/ACT Inhaler   Commonly known as:  QVAR   Used for:  Moderate intermittent asthma, with acute exacerbation   Started by:  Julianna Moraes MD        Dose:  1 puff   Inhale 1 puff into the lungs 2 times daily   Quantity:  1 Inhaler   Refills:  1       fluticasone 50 MCG/ACT spray   Commonly known as:  FLONASE   Used for:  Moderate intermittent asthma, with acute exacerbation   Started by:  Julianna Moraes MD        Dose:  1-2 spray   Spray 1-2 sprays into both nostrils daily   Quantity:  3 Bottle   Refills:  1         These medicines have changed or have updated prescriptions.        Dose/Directions    ketotifen 0.025 % Soln ophthalmic  solution   Commonly known as:  ZADITOR   This may have changed:    - how much to take  - when to take this  - reasons to take this   Used for:  History of itching of eye   Changed by:  Julianna Moraes MD        Dose:  1 drop   Place 1 drop into both eyes daily as needed for itching   Quantity:  1 Bottle   Refills:  3            Where to get your medicines      These medications were sent to [facility]/pharmacy #[id number] - [place], [place] - [address] [facility]  [address] [place] [address]    Hours:  24-hours Phone:  [phone]     albuterol 108 (90 BASE) MCG/ACT Inhaler    aspirin 81 MG EC tablet    atorvastatin 40 MG tablet    beclomethasone 80 MCG/ACT Inhaler    cetirizine 10 MG tablet    fluticasone 50 MCG/ACT spray    ketotifen 0.025 % Soln ophthalmic solution    multivitamin, therapeutic with minerals Tabs tablet                Primary Care Provider Office Phone # Fax #    Julianna Moraes [phone]       [facility] [address] [place] [address]        Equal Access to Services     [facility]: Hadii aad ku hadasho Soomaali, waaxda luqadaha, qaybta kaalmada adeegyada, guerline sosa . So [facility] [phone].    ATENCIÓN: Si habla español, tiene a lugo disposición servicios gratuitos de asistencia lingüística. [facility] [phone].    We comply with applicable federal civil rights laws and [place] laws. We do not discriminate on the basis of race, color, national origin, age, disability sex, sexual orientation or gender identity.            Thank you!     Thank you for choosing [facility]  for your care. Our goal is always to provide you with excellent care. Hearing back from our patients is one way we can continue to improve our services. Please take a few minutes to complete the written survey that you may receive in the mail after your visit with us. Thank you!             Your Updated Medication List -  Protect others around you: Learn how to safely use, store and throw away your medicines at www.disposemymeds.org.          This list is accurate as of: 9/20/17 11:18 AM.  Always use your most recent med list.                   Brand Name Dispense Instructions for use Diagnosis    albuterol 108 (90 BASE) MCG/ACT Inhaler    PROAIR HFA/PROVENTIL HFA/VENTOLIN HFA    3 Inhaler    Inhale 2 puffs into the lungs every 6 hours as needed for shortness of breath / dyspnea or wheezing    Mild intermittent asthma without complication       aspirin 81 MG EC tablet     90 tablet    Take 1 tablet (81 mg) by mouth daily    Cerebral infarction due to embolism of cerebral artery (H)       atorvastatin 40 MG tablet    LIPITOR    31 tablet    Take 1 tablet (40 mg) by mouth daily    Cerebral infarction due to embolism of cerebral artery (H)       beclomethasone 80 MCG/ACT Inhaler    QVAR    1 Inhaler    Inhale 1 puff into the lungs 2 times daily    Moderate intermittent asthma, with acute exacerbation       cetirizine 10 MG tablet    zyrTEC    30 tablet    Take 1 tablet (10 mg) by mouth every evening    Moderate intermittent asthma, with acute exacerbation       fluticasone 50 MCG/ACT spray    FLONASE    3 Bottle    Spray 1-2 sprays into both nostrils daily    Moderate intermittent asthma, with acute exacerbation       ketotifen 0.025 % Soln ophthalmic solution    ZADITOR    1 Bottle    Place 1 drop into both eyes daily as needed for itching    History of itching of eye       multivitamin, therapeutic with minerals Tabs tablet     100 tablet    Take 1 tablet by mouth daily    Poor appetite

## 2017-09-20 NOTE — PROGRESS NOTES
Preceptor attestation:  Patient seen and discussed with the resident. Assessment and plan reviewed with resident and agreed upon.  Supervising physician: Vaughn Lombardo  [facility]
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
## 2017-10-19 ENCOUNTER — OFFICE VISIT (OUTPATIENT)
Dept: FAMILY MEDICINE | Facility: CLINIC | Age: 57
End: 2017-10-19

## 2017-10-19 VITALS
SYSTOLIC BLOOD PRESSURE: 118 MMHG | OXYGEN SATURATION: 99 % | TEMPERATURE: 97.9 F | DIASTOLIC BLOOD PRESSURE: 66 MMHG | HEART RATE: 86 BPM

## 2017-10-19 DIAGNOSIS — Z12.11 SCREEN FOR COLON CANCER: ICD-10-CM

## 2017-10-19 DIAGNOSIS — R05.3 CHRONIC COUGH: Primary | ICD-10-CM

## 2017-10-19 DIAGNOSIS — Z23 NEED FOR VACCINATION: ICD-10-CM

## 2017-10-19 RX ORDER — TIOTROPIUM BROMIDE 18 UG/1
CAPSULE ORAL; RESPIRATORY (INHALATION)
Qty: 30 CAPSULE | Refills: 1 | Status: SHIPPED | OUTPATIENT
Start: 2017-10-19 | End: 2017-12-04 | Stop reason: ALTCHOICE

## 2017-10-19 NOTE — MR AVS SNAPSHOT
After Visit Summary   10/19/2017    Peter Perez    MRN: [id number]           Patient Information     Date Of Birth          1960        Visit Information        Provider Department      10/19/2017 3:30 PM Julianna Moraes MD [facility]        Today's Diagnoses     Screen for colon cancer    -  1    Need for vaccination        Chronic cough          Care Instructions    Start daily tiotoprium inhaler  Continue QVAR daily  Continue Albuterol as needed    Chest XR before you leave, I will call with results or send a letter  Schedule pulmonary function tests    See me in 1 month          Follow-ups after your visit        Additional Services     Pulmonary Function Test (PFT) Referral       Patient to stop at the [facility] Desk    Reason for Referral: chronic cough, hx of asthma, also smoker     needed: No  Language: English    May leave message on voicemail: Yes    ([place] Only) Referral should be tracked (Yes/No)?                  Future tests that were ordered for you today     Open Future Orders        Priority Expected Expires Ordered    Pulmonary Function Test (PFT) Referral Routine  11/30/2017 10/19/2017    Fecal Occult Blood - FIT, iFOB ([facility]) Routine  10/26/2017 10/19/2017            Who to contact     Please call your clinic at [phone] to:    Ask questions about your health    Make or cancel appointments    Discuss your medicines    Learn about your test results    Speak to your doctor   If you have compliments or concerns about an experience at your clinic, or if you wish to file a complaint, please contact [facility] Patient Relations at [phone] or email us at [email or web address]         Additional Information About Your Visit        MyChart Information     NetWitness is an electronic gateway that provides easy, online access to your medical records. With NetWitness, you can request a clinic appointment, read your test results,  renew a prescription or communicate with your care team.     To sign up for MyChart visit the website at [email or web address]   You will be asked to enter the access code listed below, as well as some personal information. Please follow the directions to create your username and password.     Your access code is: [email or web address]  Expires: 2017 11:18 AM     Your access code will  in 90 days. If you need help or a new code, please contact your [facility] or call [phone] for assistance.        Care EveryWhere ID     This is your Care EveryWhere ID. This could be used by other organizations to access your [place] medical records  [id number]        Your Vitals Were     Pulse Temperature Pulse Oximetry             86 97.9  F (36.6  C) (Oral) 99%          Blood Pressure from Last 3 Encounters:   10/19/17 118/66   17 123/80   17 138/87    Weight from Last 3 Encounters:   17 131 lb 6.4 oz (59.6 kg)   17 130 lb 6.4 oz (59.1 kg)   17 129 lb 12.8 oz (58.9 kg)              We Performed the Following     ADMIN VACCINE, INITIAL     TD (ADULT, 7+) PRESERVE FREE     XR CHEST 2 VW          Today's Medication Changes          These changes are accurate as of: 10/19/17  4:27 PM.  If you have any questions, ask your nurse or doctor.               Start taking these medicines.        Dose/Directions    tiotropium 18 MCG capsule   Commonly known as:  SPIRIVA HANDIHALER   Used for:  Chronic cough   Started by:  Julianna Moraes MD        Inhale contents of one capsule daily.   Quantity:  30 capsule   Refills:  1            Where to get your medicines      These medications were sent to [facility]/pharmacy #[id number] - [place], [place] - [address] AT [facility] [place]  [address] [place] [address]    Hours:  24-hours Phone:  [phone]     tiotropium 18 MCG capsule                Primary Care Provider Office Phone # Fax #    Julianna  Ole Moraes [phone]       [facility] [address] [place] [address]        Equal Access to Services     JOHN WARD : Fatimah Machado, kacey paredes, angelanarinder alasbrittelma prakashmaguielma, waxquirino cadencein hayaavera praksahdenia roach ian vargas. So [facility] [phone].    ATENCIÓN: Si habla español, tiene a lugo disposición servicios gratuitos de asistencia lingüística. Llame al [phone].    We comply with applicable federal civil rights laws and [place] laws. We do not discriminate on the basis of race, color, national origin, age, disability, sex, sexual orientation, or gender identity.            Thank you!     Thank you for choosing [facility]  for your care. Our goal is always to provide you with excellent care. Hearing back from our patients is one way we can continue to improve our services. Please take a few minutes to complete the written survey that you may receive in the mail after your visit with us. Thank you!             Your Updated Medication List - Protect others around you: Learn how to safely use, store and throw away your medicines at www.disposemymeds.org.          This list is accurate as of: 10/19/17  4:27 PM.  Always use your most recent med list.                   Brand Name Dispense Instructions for use Diagnosis    albuterol 108 (90 BASE) MCG/ACT Inhaler    PROAIR HFA/PROVENTIL HFA/VENTOLIN HFA    3 Inhaler    Inhale 2 puffs into the lungs every 6 hours as needed for shortness of breath / dyspnea or wheezing    Moderate asthma       aspirin 81 MG EC tablet     90 tablet    Take 1 tablet (81 mg) by mouth daily    Cerebral infarction due to embolism of cerebral artery (H)       atorvastatin 40 MG tablet    LIPITOR    31 tablet    Take 1 tablet (40 mg) by mouth daily    Cerebral infarction due to embolism of cerebral artery (H)       beclomethasone 80 MCG/ACT Inhaler    QVAR    1 Inhaler    Inhale 1 puff into the lungs 2 times daily    Moderate  intermittent asthma, with acute exacerbation       cetirizine 10 MG tablet    zyrTEC    30 tablet    Take 1 tablet (10 mg) by mouth every evening    Moderate intermittent asthma, with acute exacerbation       fluticasone 50 MCG/ACT spray    FLONASE    3 Bottle    Spray 1-2 sprays into both nostrils daily    Moderate intermittent asthma, with acute exacerbation       ketotifen 0.025 % Soln ophthalmic solution    ZADITOR    1 Bottle    Place 1 drop into both eyes daily as needed for itching    History of itching of eye       multivitamin, therapeutic with minerals Tabs tablet     100 tablet    Take 1 tablet by mouth daily    Poor appetite       tiotropium 18 MCG capsule    SPIRIVA HANDIHALER    30 capsule    Inhale contents of one capsule daily.    Chronic cough

## 2017-10-19 NOTE — LETTER
October 23, 2017      Peter Perez  [address] [place] [address]        Dear Peter,    The chest x-ray done at your last visit was negative, no obvious signs of infection or cancer. This is great news!     We look forward to seeing you at your next visit. Please call with any questions.     Sincerely,    Julianna Moraes MD
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
## 2017-10-19 NOTE — PATIENT INSTRUCTIONS
Start daily tiotoprium inhaler  Continue QVAR daily  Continue Albuterol as needed    Chest XR before you leave, I will call with results or send a letter  Schedule pulmonary function tests    See me in 1 month      Referral for PFT sent to [facility].  They will review and contact patient to schedule.  Rosibel  10/20/17

## 2017-10-19 NOTE — PROGRESS NOTES
Preceptor attestation:  Patient seen and discussed with the resident.  Assessment and plan reviewed with resident and agreed upon.  Supervising physician: Ernestina Seth MD  [facility]

## 2017-10-23 NOTE — PROGRESS NOTES
Please send patient a letter including the following:    Dear Peter Perez,    The chest x-ray done at your last visit was negative, no obvious signs of infection or cancer. This is great news!    We look forward to seeing you at your next visit. Please call with any questions.  Dr. Moraes

## 2017-12-04 ENCOUNTER — OFFICE VISIT (OUTPATIENT)
Dept: FAMILY MEDICINE | Facility: CLINIC | Age: 57
End: 2017-12-04

## 2017-12-04 VITALS
WEIGHT: 135 LBS | HEART RATE: 103 BPM | DIASTOLIC BLOOD PRESSURE: 88 MMHG | OXYGEN SATURATION: 98 % | BODY MASS INDEX: 19.94 KG/M2 | TEMPERATURE: 98.5 F | SYSTOLIC BLOOD PRESSURE: 148 MMHG

## 2017-12-04 DIAGNOSIS — J45.40 MODERATE PERSISTENT ASTHMA, UNSPECIFIED WHETHER COMPLICATED: Primary | ICD-10-CM

## 2017-12-04 DIAGNOSIS — F17.200 TOBACCO DEPENDENCE: ICD-10-CM

## 2017-12-04 RX ORDER — ALBUTEROL SULFATE 90 UG/1
2 AEROSOL, METERED RESPIRATORY (INHALATION) EVERY 6 HOURS PRN
Qty: 3 INHALER | Refills: 1 | Status: SHIPPED | OUTPATIENT
Start: 2017-12-04 | End: 2019-06-11

## 2017-12-04 NOTE — PATIENT INSTRUCTIONS
STOP QVAR  Start ADVAIR  - fluticasone-salmeterol (ADVAIR) 100-50 MCG/DOSE diskus inhaler; Inhale 1 puff into the lungs 2 times daily  Dispense: 3 Inhaler; Refill: 3    Continue albuterol inhaler as needed    Follow up in 1 month to discuss breathing  Go to ED for worsening shortness of breath, chest pain or any other worrisome symptoms

## 2017-12-04 NOTE — MR AVS SNAPSHOT
After Visit Summary   2017    Peter Perez    MRN: [id number]           Patient Information     Date Of Birth          1960        Visit Information        Provider Department      2017 2:30 PM Julianna Moraes MD [facility]        Today's Diagnoses     Tobacco dependence    -  1    Moderate persistent asthma, unspecified whether complicated          Care Instructions    STOP QVAR  Start ADVAIR  - fluticasone-salmeterol (ADVAIR) 100-50 MCG/DOSE diskus inhaler; Inhale 1 puff into the lungs 2 times daily  Dispense: 3 Inhaler; Refill: 3    Continue albuterol inhaler as needed    Follow up in 1 month to discuss breathing  Go to ED for worsening shortness of breath, chest pain or any other worrisome symptoms              Follow-ups after your visit        Who to contact     Please call your clinic at [phone] to:    Ask questions about your health    Make or cancel appointments    Discuss your medicines    Learn about your test results    Speak to your doctor   If you have compliments or concerns about an experience at your clinic, or if you wish to file a complaint, please contact [facility] Patient Relations at [phone] or email us at [email or web address]         Additional Information About Your Visit        MyChart Information     Kaleiot is an electronic gateway that provides easy, online access to your medical records. With Symcat, you can request a clinic appointment, read your test results, renew a prescription or communicate with your care team.     To sign up for Kaleiot visit the website at [email or web address]   You will be asked to enter the access code listed below, as well as some personal information. Please follow the directions to create your username and password.     Your access code is: [email or web address]  Expires: 2017 10:18 AM     Your access code will  in 90 days. If you need help or a new code,  please contact your [facility] or call [phone] for assistance.        Care EveryWhere ID     This is your Care EveryWhere ID. This could be used by other organizations to access your [place] medical records  [id number]        Your Vitals Were     Pulse Temperature Pulse Oximetry BMI (Body Mass Index)          103 98.5  F (36.9  C) (Oral) 98% 19.94 kg/m2         Blood Pressure from Last 3 Encounters:   12/04/17 148/88   10/19/17 118/66   09/20/17 123/80    Weight from Last 3 Encounters:   12/04/17 135 lb (61.2 kg)   09/20/17 131 lb 6.4 oz (59.6 kg)   07/20/17 130 lb 6.4 oz (59.1 kg)              Today, you had the following     No orders found for display         Today's Medication Changes          These changes are accurate as of: 12/4/17  3:16 PM.  If you have any questions, ask your nurse or doctor.               Start taking these medicines.        Dose/Directions    fluticasone-salmeterol 100-50 MCG/DOSE diskus inhaler   Commonly known as:  ADVAIR   Used for:  Moderate persistent asthma, unspecified whether complicated, Tobacco dependence   Started by:  Julianna Moraes MD        Dose:  1 puff   Inhale 1 puff into the lungs 2 times daily   Quantity:  3 Inhaler   Refills:  3         Stop taking these medicines if you haven't already. Please contact your care team if you have questions.     beclomethasone 80 MCG/ACT Inhaler   Commonly known as:  QVAR   Stopped by:  Julianna Moraes MD                Where to get your medicines      These medications were sent to [facility]/pharmacy #[id number] - [place], [place] - [address] AT [facility]  [address] [place] [address]    Hours:  24-hours Phone:  [phone]     albuterol 108 (90 BASE) MCG/ACT Inhaler    fluticasone-salmeterol 100-50 MCG/DOSE diskus inhaler                Primary Care Provider Office Phone # Fax #    Julianna Moraes [phone]       [address]  [facility] [address]        Equal Access to Services     JOHN WARD : Hadii chris mares segundo Soselma, waaxda luqadaha, qaybta kaalmaelma corleymaximilianoelma, guerline boonemilanacarolyn vargas. So [facility] [phone].    ATENCIÓN: Si habla español, tiene a lugo disposición servicios gratuitos de asistencia lingüística. Addie al [phone].    We comply with applicable federal civil rights laws and [place] laws. We do not discriminate on the basis of race, color, national origin, age, disability, sex, sexual orientation, or gender identity.            Thank you!     Thank you for choosing [facility]  for your care. Our goal is always to provide you with excellent care. Hearing back from our patients is one way we can continue to improve our services. Please take a few minutes to complete the written survey that you may receive in the mail after your visit with us. Thank you!             Your Updated Medication List - Protect others around you: Learn how to safely use, store and throw away your medicines at www.disposemymeds.org.          This list is accurate as of: 12/4/17  3:16 PM.  Always use your most recent med list.                   Brand Name Dispense Instructions for use Diagnosis    albuterol 108 (90 BASE) MCG/ACT Inhaler    PROAIR HFA/PROVENTIL HFA/VENTOLIN HFA    3 Inhaler    Inhale 2 puffs into the lungs every 6 hours as needed for shortness of breath / dyspnea or wheezing    Tobacco dependence, Moderate persistent asthma, unspecified whether complicated       aspirin 81 MG EC tablet     90 tablet    Take 1 tablet (81 mg) by mouth daily    Cerebral infarction due to embolism of cerebral artery (H)       atorvastatin 40 MG tablet    LIPITOR    31 tablet    Take 1 tablet (40 mg) by mouth daily    Cerebral infarction due to embolism of cerebral artery (H)       cetirizine 10 MG tablet    zyrTEC    30 tablet    Take 1 tablet (10 mg) by mouth every evening    Moderate intermittent asthma, with acute  exacerbation       fluticasone 50 MCG/ACT spray    FLONASE    3 Bottle    Spray 1-2 sprays into both nostrils daily    Moderate intermittent asthma, with acute exacerbation       fluticasone-salmeterol 100-50 MCG/DOSE diskus inhaler    ADVAIR    3 Inhaler    Inhale 1 puff into the lungs 2 times daily    Moderate persistent asthma, unspecified whether complicated, Tobacco dependence       ketotifen 0.025 % Soln ophthalmic solution    ZADITOR    1 Bottle    Place 1 drop into both eyes daily as needed for itching    History of itching of eye       multivitamin, therapeutic with minerals Tabs tablet     100 tablet    Take 1 tablet by mouth daily    Poor appetite       tiotropium 18 MCG capsule    SPIRIVA HANDIHALER    30 capsule    Inhale contents of one capsule daily.    Chronic cough
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
## 2017-12-05 NOTE — PROGRESS NOTES
Social Work Note:    Data and Intervention: LUIS FELIPE consulted to meet with patient and his partner to discuss options for his electricity bill.  states she does not feel patient meets requirements as indicated on the form for her to complete it with the notion that it would provide the electricity company a reason to keep from disconnected.     LUIS FELIPE met with patient and his partner in exam room #23. Patient did not speak with the SW but did appear to be engaged with eye contact and head nods in agreement with what his partner was stating. His female partner was visibly irritated with 's decision to not complete the form. She was also verbally upset that  has changed Peter's treatment regiment for his asthma from a nebulizer to an inhaler. She states that all of the other doctors patient has seen agree with nebulizer treatment. If patient is prescribed nebulizer treatment, that would warrant the medical need for electricity to continue to run within the home per electric company and state statute. SW tried to figure out of heat in the home is ran by electricity or gas. It appears it works through a gas heating method. SW explained that if it is ran through electricity they cannot disconnect the power in the winter months in [place] (October-April) per state laws. Patient's partner informs that is not the case for the housing unit they live in.     Patient and his partner indicated they would go to the Emergency Department in attempts to get the form completed. They state they do not want to see  at any future visits and want to make sure to get scheduled with a different provider with a preference for . SW indicated that would be an option at future visits. LUIS FELIPE attempted to troubleshoot options for payment of electric bill. Patient's partner states that he is already set up with a payment plan and has exhausted financial assistance to pay for the bill. He needs to maintain his  current payments at this time.        Assessment and Plan:     1.) SW attempted to problem solve with patient to resolve his electricity bill. He wants the form completed by , explained that is not an option at this time. Patient's partner verbalizes they will go to the ED to get a doctor their to complete the form.    Evie Durant

## 2017-12-06 ENCOUNTER — TELEPHONE (OUTPATIENT)
Dept: FAMILY MEDICINE | Facility: CLINIC | Age: 57
End: 2017-12-06

## 2017-12-06 DIAGNOSIS — Z53.9 ERRONEOUS ENCOUNTER--DISREGARD: Primary | ICD-10-CM

## 2017-12-06 DIAGNOSIS — J45.40 MODERATE PERSISTENT ASTHMA, UNSPECIFIED WHETHER COMPLICATED: ICD-10-CM

## 2017-12-06 DIAGNOSIS — F17.200 TOBACCO DEPENDENCE: ICD-10-CM

## 2017-12-06 NOTE — TELEPHONE ENCOUNTER
Central Prior Authorization Team   Phone: [phone]    PA Initiation- man faxed     Medication: ADVAIR 100-50 DISKUS - [facility]   Insurance Company: [employer] - Phone [phone] Fax [phone]  Pharmacy Filling the Rx: [id number] - [place], [place] - [address] AT [facility]  Filling Pharmacy Phone: [phone]  Filling Pharmacy Fax:    Start Date: 12/6/2017

## 2017-12-11 NOTE — TELEPHONE ENCOUNTER
Prior Authorization Approval    Authorization Effective Date: 12/5/2017  Authorization Expiration Date: 12/9/2018  Medication: ADVAIR 100-50 DISKUS - APPROVED  Approved Dose/Quantity:   Reference #:     Insurance Company: [employer] - Phone [phone] Fax [phone]  Expected CoPay: $0.00     CoPay Card Available:      Foundation Assistance Needed:    Which Pharmacy is filling the prescription (Not needed for infusion/clinic administered): [id number] - [place], [place] - [address] AT [facility]  Pharmacy Notified: Yes  Patient Notified: Yes

## 2018-01-24 ENCOUNTER — OFFICE VISIT (OUTPATIENT)
Dept: FAMILY MEDICINE | Facility: CLINIC | Age: 58
End: 2018-01-24
Payer: COMMERCIAL

## 2018-01-24 VITALS
SYSTOLIC BLOOD PRESSURE: 157 MMHG | WEIGHT: 133.6 LBS | DIASTOLIC BLOOD PRESSURE: 90 MMHG | HEART RATE: 90 BPM | BODY MASS INDEX: 19.73 KG/M2 | OXYGEN SATURATION: 97 % | TEMPERATURE: 97.7 F

## 2018-01-24 DIAGNOSIS — R64 CACHECTIC (H): ICD-10-CM

## 2018-01-24 DIAGNOSIS — F17.200 TOBACCO DEPENDENCE: ICD-10-CM

## 2018-01-24 DIAGNOSIS — J45.40 MODERATE PERSISTENT ASTHMA WITHOUT COMPLICATION: Primary | ICD-10-CM

## 2018-01-24 DIAGNOSIS — I10 ESSENTIAL HYPERTENSION: ICD-10-CM

## 2018-01-24 RX ORDER — HYDROCHLOROTHIAZIDE 25 MG/1
25 TABLET ORAL DAILY
Qty: 30 TABLET | Refills: 3 | Status: SHIPPED | OUTPATIENT
Start: 2018-01-24 | End: 2019-06-11

## 2018-01-24 NOTE — MR AVS SNAPSHOT
After Visit Summary   1/24/2018    Peter Perez    MRN: [id number]           Patient Information     Date Of Birth          1960        Visit Information        Provider Department      1/24/2018 3:30 PM Anthony Hunt MD [facility]        Today's Diagnoses     Moderate persistent asthma without complication    -  1    Essential hypertension        Moderate persistent asthma, unspecified whether complicated        Tobacco dependence        Cachectic (H)          Care Instructions      My Asthma Action Plan  Name: Peter Perez  YOB: 1960  Date: 1/24/2018   My doctor: Julianna Moraes   My clinic:   [address] [address]  [phone]    My Asthma Severity: moderate persistent Avoid your asthma triggers: smoke, upper respiratory infections and pollens      GREEN ZONE   Good Control    I feel good    No cough or wheeze    Can work, sleep and play without asthma symptoms       Take your asthma control medicine every day.  Take the medications listed below daily.    Advair  Diskus 200/50 mcg 1 inhalation twice a day    1. If exercise triggers your asthma, take your rescue medication (2 puffs of albuterol, Ventolin/Pro-Air) 15 minutes before exercise or sports, and during exercise if you have asthma symptoms.  2. Spacer to use with inhaler: If you have a spacer, make sure to use it with your inhaler.              YELLOW ZONE Getting Worse  I have ANY of these:    I do not feel good    Cough or wheeze    Chest feels tight    Wake up at night   1. Keep taking your Green Zone medications.  2. Start taking your rescue medicine (1-2 puffs of albuterol - Ventolin/Pro-Air) every 4-6 hours as needed.  3. If symptoms are not controlled with above, can take 2 puffs every 20 minutes for up to 1 hour, then continue every 4 hours if needed.   4. If you do not return to the Green Zone in 12-24 hours or you get worse, call the clinic.         RED ZONE Medical Alert -  Get Help  I have ANY of these:    I feel awful    Medicine is not helping    Breathing getting harder    Trouble walking or talking    Nose opens wide to breathe       1. Take your rescue medicine NOW (6-8 puffs of albuterol - Ventolin/Pro-Air) for every 20 minutes for up to 1 hour.  2. If your provider has prescribed an oral steroid medicine (n/a), start taking it NOW.  3. Call your doctor NOW.  4. If you are still in the Red Zone after 20 minutes and you have not reached your doctor:    Take your rescue medicine again (6-8 puffs of albuterol - Ventolin/Pro-Air) and    Call 911 or go to the emergency room right away    See your regular doctor within 1 weeks of an Emergency Room or Urgent Care visit for follow-up treatment.        This Asthma Action Plan provides authorization for the administration of medication described in the AAP.  YES  This child has the knowledge and skills to self-administer rescue medication at school or  with approval of the school nurse.  YES    Electronically signed by: Anthony Hunt    Annual Reminders:  Meet with Asthma Educator,  Flu Shot in the Fall, Pneumonia Shot  Pharmacy:    [id number] - [place], [place] - [address] AT [facility]  [id number] [place], [place] - [address]          Follow-ups after your visit        Who to contact     Please call your clinic at [phone] to:    Ask questions about your health    Make or cancel appointments    Discuss your medicines    Learn about your test results    Speak to your doctor   If you have compliments or concerns about an experience at your clinic, or if you wish to file a complaint, please contact [facility] Patient Relations at [phone] or email us at [email or web address]         Additional Information About Your Visit        MyChart Information     [employer] is an electronic gateway that provides easy, online access to your medical records.  With [employer], you can request a clinic appointment, read your test results, renew a prescription or communicate with your care team.     To sign up for [employer] visit the website at [email or web address]   You will be asked to enter the access code listed below, as well as some personal information. Please follow the directions to create your username and password.     Your access code is: [email or web address]  Expires: 2018  4:24 PM     Your access code will  in 90 days. If you need help or a new code, please contact your [facility] or call [phone] for assistance.        Care EveryWhere ID     This is your Care EveryWhere ID. This could be used by other organizations to access your [facility] medical records  [id number]        Your Vitals Were     Pulse Temperature Pulse Oximetry BMI (Body Mass Index)          90 97.7  F (36.5  C) (Oral) 97% 19.73 kg/m2         Blood Pressure from Last 3 Encounters:   18 157/90   17 148/88   10/19/17 118/66    Weight from Last 3 Encounters:   18 133 lb 9.6 oz (60.6 kg)   17 135 lb (61.2 kg)   17 131 lb 6.4 oz (59.6 kg)              We Performed the Following     Asthma Action Plan (AAP)          Today's Medication Changes          These changes are accurate as of 18  4:24 PM.  If you have any questions, ask your nurse or doctor.               Start taking these medicines.        Dose/Directions    fluticasone-salmeterol 250-50 MCG/DOSE diskus inhaler   Commonly known as:  ADVAIR   Used for:  Moderate persistent asthma without complication   Replaces:  fluticasone-salmeterol 100-50 MCG/DOSE diskus inhaler   Started by:  Anthony Hunt MD        Dose:  1 puff   Inhale 1 puff into the lungs 2 times daily   Quantity:  3 Inhaler   Refills:  3       hydrochlorothiazide 25 MG tablet   Commonly known as:  HYDRODIURIL   Used for:  Essential hypertension   Started by:  Anthony Hunt MD        Dose:  25 mg    Take 1 tablet (25 mg) by mouth daily   Quantity:  30 tablet   Refills:  3       order for DME   Used for:  Cachectic (H)   Started by:  Anthony Hunt MD        Equipment being ordered: ensure  Take one can by mouth twice daily   Quantity:  100 Can   Refills:  11         Stop taking these medicines if you haven't already. Please contact your care team if you have questions.     fluticasone-salmeterol 100-50 MCG/DOSE diskus inhaler   Commonly known as:  ADVAIR   Replaced by:  fluticasone-salmeterol 250-50 MCG/DOSE diskus inhaler   Stopped by:  Anthony Hunt MD                Where to get your medicines      These medications were sent to [facility]/pharmacy #[id number] - [place], [place] - [address] AT [facility]  [address] [place] [address]    Hours:  24-hours Phone:  [phone]     fluticasone-salmeterol 250-50 MCG/DOSE diskus inhaler    hydrochlorothiazide 25 MG tablet         Some of these will need a paper prescription and others can be bought over the counter.  Ask your nurse if you have questions.     Bring a paper prescription for each of these medications     order for DME                Primary Care Provider Office Phone # Fax #    Julianna Ole Moraes [phone]       [address] [place] [id number]        Equal Access to Services     JOHN WARD AH: Hadii chris ku hadasho Soomaali, waaxda luqadaha, qaybta kaalmada adeegyada, waxquirino vargas. So [facility] [phone].    ATENCIÓN: Si habla español, tiene a lugo disposición servicios gratuitos de asistencia lingüística. Llame al [phone].    We comply with applicable federal civil rights laws and [place] laws. We do not discriminate on the basis of race, color, national origin, age, disability, sex, sexual orientation, or gender identity.            Thank you!     Thank you for choosing [facility]  for your care. Our goal is always to provide you with excellent care. Hearing  back from our patients is one way we can continue to improve our services. Please take a few minutes to complete the written survey that you may receive in the mail after your visit with us. Thank you!             Your Updated Medication List - Protect others around you: Learn how to safely use, store and throw away your medicines at www.disposemymeds.org.          This list is accurate as of 1/24/18  4:24 PM.  Always use your most recent med list.                   Brand Name Dispense Instructions for use Diagnosis    albuterol 108 (90 BASE) MCG/ACT Inhaler    PROAIR HFA/PROVENTIL HFA/VENTOLIN HFA    3 Inhaler    Inhale 2 puffs into the lungs every 6 hours as needed for shortness of breath / dyspnea or wheezing    Tobacco dependence, Moderate persistent asthma, unspecified whether complicated       aspirin 81 MG EC tablet     90 tablet    Take 1 tablet (81 mg) by mouth daily    Cerebral infarction due to embolism of cerebral artery (H)       atorvastatin 40 MG tablet    LIPITOR    31 tablet    Take 1 tablet (40 mg) by mouth daily    Cerebral infarction due to embolism of cerebral artery (H)       cetirizine 10 MG tablet    zyrTEC    30 tablet    Take 1 tablet (10 mg) by mouth every evening    Moderate intermittent asthma, with acute exacerbation       fluticasone 50 MCG/ACT spray    FLONASE    3 Bottle    Spray 1-2 sprays into both nostrils daily    Moderate intermittent asthma, with acute exacerbation       fluticasone-salmeterol 250-50 MCG/DOSE diskus inhaler    ADVAIR    3 Inhaler    Inhale 1 puff into the lungs 2 times daily    Moderate persistent asthma without complication       hydrochlorothiazide 25 MG tablet    HYDRODIURIL    30 tablet    Take 1 tablet (25 mg) by mouth daily    Essential hypertension       ketotifen 0.025 % Soln ophthalmic solution    ZADITOR    1 Bottle    Place 1 drop into both eyes daily as needed for itching    History of itching of eye       multivitamin, therapeutic with minerals  Tabs tablet     100 tablet    Take 1 tablet by mouth daily    Poor appetite       order for DME     100 Can    Equipment being ordered: ensure  Take one can by mouth twice daily    Cachectic (H)

## 2018-01-24 NOTE — PROGRESS NOTES
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  He reports that he had to go to the ER because his electricity was shut off and he was unable to use his nebulizer, which he typically uses t.i.d.  He reports that it is difficult for him to get much of an effect out of his albuterol inhaler because of the left-sided weakness He reports that he has been taking Advair as prescribed, but he reports that he still needs to use his nebulizer 2-3 times daily.  He is amenable to going up on his dose of the Advair.  AAP was reviewed with him today.     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  He requests a refill of this, which we'll facilitate.     After reviewing his chart, he has been noticed to have elevated blood pressure in the last several visits after discontinuing his lisinopril due to adverse reaction.  He hasn't been on HCTZ in the past.     His chronic problem list and medications were reviewed and updated.   REVIEW OF SYSTEMS:  No fevers or chills.  No chest pain.  No abdominal pain or difficulty with bowel or bladder function.  No swelling of the extremities.   PHYSICAL EXAM:  Exam  reveals cachectic-appearing male in no acute distress.   SKIN:  Supple, with no rashes or ecchymoses.  No pallor or icterus.   NECK:  Supple, with no lymphadenopathy.   LUNGS:  Clear to auscultation bilaterally, with prolonged expiratory phase.   HEART:  Regular rate and rhythm.  No murmurs.   ABDOMEN:  Soft, with normoactive bowel sounds.   EXTREMITIES:  Atrophy of the left upper extremity, with contractures of the left hand.  Otherwise, no peripheral edema.   ASSESSMENT:  A  57-year-old male with history of CVA with cachexia, poorly controlled blood pressure and moderate persistent asthma which isn't under good  control.   PLAN:  We'll increase Advair to 200/50 b.i.d.  Asthma action plan was filled out.  We'll see him back in one months' time.  We'll start him on HCTZ 25 mg daily and we'll reassess this in 1 months' time as well.  We'll refill Ensure.  Form was filled out for [employer] telling them not to discontinue his electricity due to his need for nebulizer.

## 2018-01-24 NOTE — PATIENT INSTRUCTIONS
My Asthma Action Plan  Name: Peter Perez  YOB: 1960  Date: 1/24/2018   My doctor: Julianna Moraes   My clinic:   [place] [address]  [phone]    My Asthma Severity: moderate persistent Avoid your asthma triggers: smoke, upper respiratory infections and pollens      GREEN ZONE   Good Control    I feel good    No cough or wheeze    Can work, sleep and play without asthma symptoms       Take your asthma control medicine every day.  Take the medications listed below daily.    Advair  Diskus 200/50 mcg 1 inhalation twice a day    1. If exercise triggers your asthma, take your rescue medication (2 puffs of albuterol, Ventolin/Pro-Air) 15 minutes before exercise or sports, and during exercise if you have asthma symptoms.  2. Spacer to use with inhaler: If you have a spacer, make sure to use it with your inhaler.              YELLOW ZONE Getting Worse  I have ANY of these:    I do not feel good    Cough or wheeze    Chest feels tight    Wake up at night   1. Keep taking your Green Zone medications.  2. Start taking your rescue medicine (1-2 puffs of albuterol - Ventolin/Pro-Air) every 4-6 hours as needed.  3. If symptoms are not controlled with above, can take 2 puffs every 20 minutes for up to 1 hour, then continue every 4 hours if needed.   4. If you do not return to the Green Zone in 12-24 hours or you get worse, call the clinic.         RED ZONE Medical Alert - Get Help  I have ANY of these:    I feel awful    Medicine is not helping    Breathing getting harder    Trouble walking or talking    Nose opens wide to breathe       1. Take your rescue medicine NOW (6-8 puffs of albuterol - Ventolin/Pro-Air) for every 20 minutes for up to 1 hour.  2. If your provider has prescribed an oral steroid medicine (n/a), start taking it NOW.  3. Call your doctor NOW.  4. If you are still in the Red Zone after 20 minutes and you have not reached your doctor:    Take your rescue  medicine again (6-8 puffs of albuterol - Ventolin/Pro-Air) and    Call 911 or go to the emergency room right away    See your regular doctor within 1 weeks of an Emergency Room or Urgent Care visit for follow-up treatment.        This Asthma Action Plan provides authorization for the administration of medication described in the AAP.  YES  This child has the knowledge and skills to self-administer rescue medication at school or  with approval of the school nurse.  YES    Electronically signed by: Anthony Hunt    Annual Reminders:  Meet with Asthma Educator,  Flu Shot in the Fall, Pneumonia Shot  Pharmacy:    [id number] - [place], [place] - [address] AT [facility]  [id number] [place], [place] - [address]

## 2018-01-25 NOTE — PROGRESS NOTES
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  He reports that he was unable to use his nebulizer because of electricity being shut off in his apartment, which necessitated an ER visit.  The ER physician felt that the nebulizer was necessary and I'll fill out his [employer] performed today.     He does have some left-sided weakness from his stroke and he has cachexia.  He is currently only 130 pounds.  He used Ensure in the past, which helped, and he would like a refill of this.     He reports that his asthma isn't necessarily well controlled, as he use his nebulizer 2-3 times daily.  He reports that he is taking his Advair, although it is a low dose and he is amenable to dosage adjustment.     In reviewing his chart,  he has been rather hypertensive for the last several office visits since he stopped using lisinopril due to a bad reaction.     His chronic problem list and medications were reviewed and updated.   REVIEW OF SYSTEMS:  Significant for no headache or dizziness.  He denies any SOB or chest pain.  He reports occasional cough.  He denies any abdominal pain or difficulty with bowel or bladder function.  He does have contractures on his left hand.  He denies any swelling distally.   PHYSICAL EXAM:   VITAL SIGNS:  As above.   GENERAL:  Exam reveals cachectic-appearing male in no acute distress.   SKIN:  Supple, with no rashes or ecchymoses.   HEENT:  Normocephalic and atraumatic.  Oropharynx is moist, with no lesions.

## 2018-05-14 ENCOUNTER — TELEPHONE (OUTPATIENT)
Dept: FAMILY MEDICINE | Facility: CLINIC | Age: 58
End: 2018-05-14

## 2018-05-15 NOTE — TELEPHONE ENCOUNTER
Patient and his partner called to schedule an appointment and were transferred to me to discuss no shows. Patient has missed 10 appointments in the past year. His partner informed me that if he is note feeling well, they don't come in. They schedule back to back appointments because she does not get medical rides so if one misses an appointment, they both do. She stated that patient loves [facility] and want to continue to come here. Patients partner said that they would work on making it to their appointments. Asked what they could do if they are not able to make it and she informed me that patient's  phone can't make out going calls. She did inform me that they have someone else's phone that they can borrow to call in and cancel. Explained that he has missed a lot of appointments and even if they can call ahead a few minutes and cancel we would appreciate it. Also informed her that if it continues, we might need to explore whether or not they can continue to be seen here.     Morena Galan

## 2019-05-31 ENCOUNTER — TRANSFERRED RECORDS (OUTPATIENT)
Dept: HEALTH INFORMATION MANAGEMENT | Facility: CLINIC | Age: 59
End: 2019-05-31

## 2019-06-03 ENCOUNTER — TRANSFERRED RECORDS (OUTPATIENT)
Dept: HEALTH INFORMATION MANAGEMENT | Facility: CLINIC | Age: 59
End: 2019-06-03

## 2019-06-03 ENCOUNTER — TELEPHONE (OUTPATIENT)
Dept: FAMILY MEDICINE | Facility: CLINIC | Age: 59
End: 2019-06-03

## 2019-06-03 NOTE — TELEPHONE ENCOUNTER
[facility] phone call message-patient reporting a symptom:     Symptom: POS STROKE     Same Day Visit Offered: NEEDS TO TALK TO NURSE      Additional comments:     OK to leave message on voice mail? Yes      Primary language: English      needed? No    Call taken on Lea 3, 2019 at 11:38 AM by Evens Pacheco

## 2019-06-03 NOTE — TELEPHONE ENCOUNTER
Spoke with patient's girl friend who states that patient is having slurred speech and has noted increased left sided facial droopiness along with him biting his tongue.  Patient had been to [facility] over the wkend for the same symptoms but she states they are getting worse.  Patient can be heard talking in the background.  Patient's son also with them.  Because of worsening symptoms instructed them to take patient to the ER and/or call 911 immediately.  They will take him to the ER now.      Routed to Dr. Moraes/ULISES Perez RN

## 2019-06-11 ENCOUNTER — OFFICE VISIT (OUTPATIENT)
Dept: PHARMACY | Facility: CLINIC | Age: 59
End: 2019-06-11
Payer: COMMERCIAL

## 2019-06-11 ENCOUNTER — OFFICE VISIT (OUTPATIENT)
Dept: FAMILY MEDICINE | Facility: CLINIC | Age: 59
End: 2019-06-11
Payer: COMMERCIAL

## 2019-06-11 VITALS
DIASTOLIC BLOOD PRESSURE: 83 MMHG | WEIGHT: 126.2 LBS | TEMPERATURE: 97.9 F | BODY MASS INDEX: 18.69 KG/M2 | SYSTOLIC BLOOD PRESSURE: 122 MMHG | OXYGEN SATURATION: 98 % | RESPIRATION RATE: 20 BRPM | HEART RATE: 102 BPM | HEIGHT: 69 IN

## 2019-06-11 DIAGNOSIS — F17.200 TOBACCO DEPENDENCE: ICD-10-CM

## 2019-06-11 DIAGNOSIS — I63.9 CEREBROVASCULAR ACCIDENT (CVA), UNSPECIFIED MECHANISM (H): Primary | ICD-10-CM

## 2019-06-11 DIAGNOSIS — J45.40 MODERATE PERSISTENT ASTHMA, UNSPECIFIED WHETHER COMPLICATED: ICD-10-CM

## 2019-06-11 DIAGNOSIS — I63.40 CEREBRAL INFARCTION DUE TO EMBOLISM OF CEREBRAL ARTERY (H): ICD-10-CM

## 2019-06-11 DIAGNOSIS — R64 CACHECTIC (H): ICD-10-CM

## 2019-06-11 DIAGNOSIS — I10 ESSENTIAL HYPERTENSION: ICD-10-CM

## 2019-06-11 PROBLEM — M94.20 CHONDROMALACIA: Status: ACTIVE | Noted: 2019-06-11

## 2019-06-11 PROBLEM — F11.20 OPIOID TYPE DEPENDENCE, EPISODIC (H): Status: ACTIVE | Noted: 2019-06-11

## 2019-06-11 PROBLEM — M05.9 RHEUMATOID ARTHRITIS, SEROPOSITIVE (H): Status: ACTIVE | Noted: 2018-07-24

## 2019-06-11 PROBLEM — M25.569 PAIN IN JOINT, LOWER LEG: Status: ACTIVE | Noted: 2019-06-11

## 2019-06-11 PROBLEM — R47.1 DYSARTHRIA: Status: ACTIVE | Noted: 2019-06-11

## 2019-06-11 PROBLEM — G81.00 FLACCID HEMIPLEGIA AFFECTING UNSPECIFIED SIDE (H): Status: ACTIVE | Noted: 2019-06-11

## 2019-06-11 PROBLEM — S14.3XXA INJURY TO BRACHIAL PLEXUS: Status: ACTIVE | Noted: 2019-06-11

## 2019-06-11 RX ORDER — FLUTICASONE PROPIONATE 50 MCG
1-2 SPRAY, SUSPENSION (ML) NASAL DAILY
Qty: 9.9 ML | Refills: 3 | Status: SHIPPED | OUTPATIENT
Start: 2019-06-11 | End: 2019-10-29

## 2019-06-11 RX ORDER — ATORVASTATIN CALCIUM 40 MG/1
40 TABLET, FILM COATED ORAL DAILY
Qty: 90 TABLET | Refills: 3 | Status: SHIPPED | OUTPATIENT
Start: 2019-06-11 | End: 2019-10-29

## 2019-06-11 RX ORDER — ALBUTEROL SULFATE 90 UG/1
2 AEROSOL, METERED RESPIRATORY (INHALATION) EVERY 6 HOURS PRN
Qty: 3 INHALER | Refills: 1 | Status: SHIPPED | OUTPATIENT
Start: 2019-06-11 | End: 2019-10-29

## 2019-06-11 RX ORDER — LOSARTAN POTASSIUM 50 MG/1
50 TABLET ORAL DAILY
Qty: 90 TABLET | Refills: 3 | Status: SHIPPED | OUTPATIENT
Start: 2019-06-11 | End: 2019-10-29

## 2019-06-11 RX ORDER — HYDROCHLOROTHIAZIDE 25 MG/1
25 TABLET ORAL DAILY
Qty: 90 TABLET | Refills: 3 | Status: CANCELLED | OUTPATIENT
Start: 2019-06-11

## 2019-06-11 RX ORDER — CETIRIZINE HYDROCHLORIDE 10 MG/1
10 TABLET ORAL EVERY EVENING
Qty: 90 TABLET | Refills: 3 | Status: SHIPPED | OUTPATIENT
Start: 2019-06-11 | End: 2019-10-29

## 2019-06-11 ASSESSMENT — PATIENT HEALTH QUESTIONNAIRE - PHQ9
5. POOR APPETITE OR OVEREATING: SEVERAL DAYS
SUM OF ALL RESPONSES TO PHQ QUESTIONS 1-9: 16

## 2019-06-11 ASSESSMENT — ASTHMA QUESTIONNAIRES
QUESTION_4 LAST FOUR WEEKS HOW OFTEN HAVE YOU USED YOUR RESCUE INHALER OR NEBULIZER MEDICATION (SUCH AS ALBUTEROL): TWO OR THREE TIMES PER WEEK
ACT_TOTALSCORE: 15
QUESTION_5 LAST FOUR WEEKS HOW WOULD YOU RATE YOUR ASTHMA CONTROL: SOMEWHAT CONTROLLED
QUESTION_1 LAST FOUR WEEKS HOW MUCH OF THE TIME DID YOUR ASTHMA KEEP YOU FROM GETTING AS MUCH DONE AT WORK, SCHOOL OR AT HOME: SOME OF THE TIME
QUESTION_2 LAST FOUR WEEKS HOW OFTEN HAVE YOU HAD SHORTNESS OF BREATH: THREE TO SIX TIMES A WEEK
QUESTION_3 LAST FOUR WEEKS HOW OFTEN DID YOUR ASTHMA SYMPTOMS (WHEEZING, COUGHING, SHORTNESS OF BREATH, CHEST TIGHTNESS OR PAIN) WAKE YOU UP AT NIGHT OR EARLIER THAN USUAL IN THE MORNING: ONCE A WEEK
ACUTE_EXACERBATION_TODAY: NO

## 2019-06-11 ASSESSMENT — ANXIETY QUESTIONNAIRES
5. BEING SO RESTLESS THAT IT IS HARD TO SIT STILL: SEVERAL DAYS
IF YOU CHECKED OFF ANY PROBLEMS ON THIS QUESTIONNAIRE, HOW DIFFICULT HAVE THESE PROBLEMS MADE IT FOR YOU TO DO YOUR WORK, TAKE CARE OF THINGS AT HOME, OR GET ALONG WITH OTHER PEOPLE: SOMEWHAT DIFFICULT
6. BECOMING EASILY ANNOYED OR IRRITABLE: SEVERAL DAYS
1. FEELING NERVOUS, ANXIOUS, OR ON EDGE: SEVERAL DAYS
GAD7 TOTAL SCORE: 6
2. NOT BEING ABLE TO STOP OR CONTROL WORRYING: SEVERAL DAYS
3. WORRYING TOO MUCH ABOUT DIFFERENT THINGS: SEVERAL DAYS
7. FEELING AFRAID AS IF SOMETHING AWFUL MIGHT HAPPEN: NOT AT ALL

## 2019-06-11 ASSESSMENT — PAIN SCALES - GENERAL: PAINLEVEL: NO PAIN (0)

## 2019-06-11 ASSESSMENT — MIFFLIN-ST. JEOR: SCORE: 1382.43

## 2019-06-11 NOTE — PROGRESS NOTES
Preceptor Attestation:   Patient seen, evaluated and discussed with the resident. I have verified the content of the note, which accurately reflects my assessment of the patient and the plan of care.   Supervising Physician:  Anthony Hunt MD
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
## 2019-06-11 NOTE — Clinical Note
Hi Dr. Padgett, See my note for the details as to why warfarin was stopped in the past. Any thoughts on what should be done at follow-up? Continue ASA alone? DOAC?

## 2019-06-11 NOTE — PATIENT INSTRUCTIONS
To do list:  1) Continue taking blood pressure medication - change to losartan, cholesterol medication atorvastatin, aspirin 325  2) Complete your 1 month of wearing the heart monitor and send in to the location given to you. If problems with this, call your neurologist at [facility]  3) Take ensure prescription to a medical supply store  4) Start your physical and speech therapy as scheduled    Return to clinic in 2 weeks for recheck

## 2019-06-11 NOTE — PROGRESS NOTES
"  Hospitalization Follow-up Visit         HPI       Hospital Follow-up Visit:    Hospital:  [facility]   Date of Admission: 6/3/2019  Date of Discharge: 6/5/2019  Reason(s) for Admission: Subacute CVA    Patient states that he was originally seen at [facility] ED on 5/31/19 for \"wake up\" onset of slurred speech, he was noted to have new L sided facial droop. He was not a candidate for tPA. Head and neck CTA and brain MRI were negative, and he was discharged home. Pt then had worsening of his deficits and again present to [facility] ED on 6/3, at which time MRI showed a small area of subacute ischemia involving the posterior lateral L frontal lobe. He was admitted for work-up and management of subacute CVA. ROSALINDA was negative for thrombus. Pt passed video swallow study and was approved for normal diet, thin liquids. Neurology felt this stroke to be embolic, possibly his third embolic stroke based on imaging. Recommendation was for  to \"bridge\" to warfarin initiation at our clinic.      Today, Pt is able to give me some information about his hospitalization, but not a detailed recounting. \"They did a lot of tests.\" He states that R sided facial droop and dysarthria are new since this event. He does not believe that the R arm or leg have been affected. He states that he is already set up for speech and physical therapy. He does not recall the details of these appointments, but has them written down at home. He is wearing a heart monitor. States that he has been instructed to send this back in 1 month. Also has these details at home. He is unsure of the medications that were started in the hospital. It seems that he was not taking any medications regularly prior to admission. He does recall that he was prescribed warfarin in the past, but is unsure of the details. Does not know why this was stopped.             Medications reviewed by: by PharmD    Summary of hospitalization:  CareEverywhere information obtained and " "reviewed  Diagnostic Tests/Treatments reviewed.  Follow up needed: ACT monitor - patient is to wear for 4 weeks and mail back. Results should go to Teresa Starr CNP     Other Healthcare Providers Involved in Patient s Care:  Follow up with Teresa Starr CNP 8/26/19 1:00 pm at the [facility]  PT and SLP at [facility]    Update since discharge: stable.   Plan of care communicated with patient                   Review of Systems:   CONSTITUTIONAL: no fatigue, no unexpected change in weight  SKIN: no worrisome rashes, no worrisome moles, no worrisome lesions  EYES: no acute vision problems or changes  ENT: no ear problems, no mouth problems, no throat problems  RESP: no significant cough, no shortness of breath  CV: no chest pain, no palpitations, no new or worsening peripheral edema  GI: no nausea, no vomiting, no constipation, no diarrhea  NEURO: + new R-sided facial droop and difficulty speaking            Physical Exam:     Vitals:    06/11/19 0954   BP: 122/83   Pulse: 102   Resp: 20   Temp: 97.9  F (36.6  C)   TempSrc: Oral   SpO2: 98%   Weight: 57.2 kg (126 lb 3.2 oz)   Height: 1.76 m (5' 9.29\")     Body mass index is 18.48 kg/m .    Gen: Cachectic adult male. Alert, oriented and appropriate. NAD  HEENT: Normocephalic. R-sided facial droop. EOMI, PERRL, no conjunctival injection or scleral icterus. No nasal discharge. MMM, wiping saliva from L mouth at times  Neck: Supple  CV: RRR, no rubs, murmurs or extra heart sounds  Pulm: Rhonci present bilaterally. No wheezes or crackles. Breathing comfortably on room air  Neuro: Moderate R facial droop. Facial sensation intact to light touch bilaterally. Palate rises symmetrically. Tongue protrudes midline. Speech dysarthric. No word finding difficulty appreciated. L arm atrophic, arm strength 3/5. R arm without pronator drift. Strength 5/5 in RUE and LE.          Results:   None new    Assessment and Plan      " "Peter was seen today for hospital f/u.    Diagnoses and all orders for this visit:    Cerebrovascular accident (CVA), unspecified mechanism (H)  Pt is following up for subacute CVA, thought to be embolic. This is his second clinical, possibly third such CVA based on imaging. He had a negative ROSALINDA during admission and a similarly negative ROSALINDA in 2015 when he was hospitalized for a similar stroke. The prior ROSALINDA did show \"Lambl's excrescences\" on the aortic leaflets, which apparently may be a source of embolic clot. Pt was discharged on  mg. Discharge summary recommends initiation of warfarin at our clinic. However, chart review shows that the patient was on warfarin previous after his 2015 stroke, but this was stopped after hospitalization in late 2015 for anemia secondary to epistaxis and INR of 13.7. He was taken off ASA shortly thereafter due to bleeding hemorrhoids and Hgb 7. Given all of this, he is a poor candidate for warfarin. It appears that he has no known history of atrial fibrillation.   - Continue  mg for now  - Pt will follow-up in 2 weeks. At this time, plan for conversation regarding risks, benefits of additional anticoaguation. Possibly DOAC?   - Initiate atorvastatin 40 mg daily   - PT/OT and speech therapy at [facility] as scheduled  - Follow-up with neurology at [facility] in August as scheduled  - Pt will continue to wear cardiac monitor and return to neurology after 4 weeks    Essential hypertension  Blood pressure well-controlled today. Pt was discharged with new prescription for lisinopril 5 mg daily, however clinic records show that he was taken off lisinopril in the past for lip swelling. Given this, we will change to losartan. Discharge summary also stated that patient should continue hydrochlorothiazide. He has not bee taking this and we will not resume at this time.   -     losartan (COZAAR) 50 MG tablet; Take 1 tablet (50 mg) by mouth daily    Tobacco dependence  Pt " desires tobacco cessation. He is not able to stay for further counseling regarding this today. Plan for further discussion upon follow-up.    Moderate intermittent asthma, with acute exacerbation  Pt requests refills today.   -     fluticasone (FLONASE) 50 MCG/ACT nasal spray; Spray 1-2 sprays into both nostrils daily  -     cetirizine (ZYRTEC) 10 MG tablet; Take 1 tablet (10 mg) by mouth every evening    E&M code to be billed if TCM cannot be: 78791    Options for treatment and follow-up care were reviewed with the patient  Peter Vasquezowan   engaged in the decision making process and verbalized understanding of the options discussed and agreed with the final plan.      Zenia Abraham MD    I precepted today with Anthony Hunt MD.

## 2019-06-11 NOTE — NURSING NOTE
Chief Complaint   Patient presents with     Hospital F/U     TCM [facility] F/U. Cerebrovascular Accident        Javier Felder, FORTUNATO

## 2019-06-12 ASSESSMENT — ANXIETY QUESTIONNAIRES: GAD7 TOTAL SCORE: 6

## 2019-06-12 ASSESSMENT — ASTHMA QUESTIONNAIRES: ACT_TOTALSCORE: 15

## 2019-06-13 ENCOUNTER — TELEPHONE (OUTPATIENT)
Dept: FAMILY MEDICINE | Facility: CLINIC | Age: 59
End: 2019-06-13

## 2019-06-13 DIAGNOSIS — R64 CACHECTIC (H): ICD-10-CM

## 2019-06-13 NOTE — TELEPHONE ENCOUNTER
Pt went to get his prescription for ensure filled and the pharmacy does not do it any more.  In order for the patient to get it paid for, he would need a new prescription for four times daily.  Please advise.

## 2019-06-14 NOTE — TELEPHONE ENCOUNTER
I called Matthewvictor hugo (pt's girlfriend) this is a new Rx that were giving on 6/11/19 by Dr. Abraham. Asking for 4x daily script. Suri Lovett, CMA

## 2019-10-29 ENCOUNTER — OFFICE VISIT (OUTPATIENT)
Dept: FAMILY MEDICINE | Facility: CLINIC | Age: 59
End: 2019-10-29
Payer: COMMERCIAL

## 2019-10-29 VITALS
WEIGHT: 127.6 LBS | SYSTOLIC BLOOD PRESSURE: 159 MMHG | RESPIRATION RATE: 17 BRPM | DIASTOLIC BLOOD PRESSURE: 91 MMHG | HEART RATE: 85 BPM | OXYGEN SATURATION: 99 % | BODY MASS INDEX: 18.69 KG/M2 | TEMPERATURE: 98.7 F

## 2019-10-29 DIAGNOSIS — R05.9 COUGH: ICD-10-CM

## 2019-10-29 DIAGNOSIS — G89.29 CHRONIC PAIN OF RIGHT KNEE: ICD-10-CM

## 2019-10-29 DIAGNOSIS — J45.40 MODERATE PERSISTENT ASTHMA, UNSPECIFIED WHETHER COMPLICATED: ICD-10-CM

## 2019-10-29 DIAGNOSIS — I63.40 CEREBRAL INFARCTION DUE TO EMBOLISM OF CEREBRAL ARTERY (H): ICD-10-CM

## 2019-10-29 DIAGNOSIS — F17.200 TOBACCO DEPENDENCE: ICD-10-CM

## 2019-10-29 DIAGNOSIS — M25.561 CHRONIC PAIN OF RIGHT KNEE: ICD-10-CM

## 2019-10-29 DIAGNOSIS — Z00.00 ROUTINE GENERAL MEDICAL EXAMINATION AT A HEALTH CARE FACILITY: Primary | ICD-10-CM

## 2019-10-29 DIAGNOSIS — I10 ESSENTIAL HYPERTENSION: ICD-10-CM

## 2019-10-29 RX ORDER — ALBUTEROL SULFATE 90 UG/1
2 AEROSOL, METERED RESPIRATORY (INHALATION) EVERY 6 HOURS PRN
Qty: 3 INHALER | Refills: 1 | Status: SHIPPED | OUTPATIENT
Start: 2019-10-29 | End: 2020-04-21

## 2019-10-29 RX ORDER — ATORVASTATIN CALCIUM 40 MG/1
40 TABLET, FILM COATED ORAL DAILY
Qty: 90 TABLET | Refills: 3 | Status: SHIPPED | OUTPATIENT
Start: 2019-10-29 | End: 2020-04-21

## 2019-10-29 RX ORDER — ACETAMINOPHEN 325 MG/1
325-650 TABLET ORAL EVERY 6 HOURS PRN
Qty: 90 TABLET | Refills: 1 | Status: SHIPPED | OUTPATIENT
Start: 2019-10-29 | End: 2020-04-21

## 2019-10-29 RX ORDER — LOSARTAN POTASSIUM 50 MG/1
50 TABLET ORAL DAILY
Qty: 90 TABLET | Refills: 3 | Status: SHIPPED | OUTPATIENT
Start: 2019-10-29 | End: 2020-04-21

## 2019-10-29 RX ORDER — CETIRIZINE HYDROCHLORIDE 10 MG/1
10 TABLET ORAL EVERY EVENING
Qty: 90 TABLET | Refills: 3 | Status: SHIPPED | OUTPATIENT
Start: 2019-10-29 | End: 2020-04-21

## 2019-10-29 RX ORDER — FLUTICASONE PROPIONATE 50 MCG
1-2 SPRAY, SUSPENSION (ML) NASAL DAILY
Qty: 9.9 ML | Refills: 3 | Status: SHIPPED | OUTPATIENT
Start: 2019-10-29 | End: 2020-04-21

## 2019-10-29 RX ORDER — LACTOSE-REDUCED FOOD
1 LIQUID (ML) ORAL 3 TIMES DAILY
Qty: 60 BOTTLE | Refills: 3 | Status: SHIPPED | OUTPATIENT
Start: 2019-10-29 | End: 2020-02-25

## 2019-10-29 NOTE — PATIENT INSTRUCTIONS
"  Preventive Health Recommendations  Male Ages 50 - 64    Yearly exam:             See your health care provider every year in order to  o   Review health changes.   o   Discuss preventive care.    o   Review your medicines if your doctor has prescribed any.     Have a cholesterol test every 5 years, or more frequently if you are at risk for high cholesterol/heart disease.     Have a diabetes test (fasting glucose) every three years. If you are at risk for diabetes, you should have this test more often.     Have a colonoscopy at age 50, or have a yearly FIT test (stool test). These exams will check for colon cancer.      Talk with your health care provider about whether or not a prostate cancer screening test (PSA) is right for you.    You should be tested each year for STDs (sexually transmitted diseases), if you re at risk.     Shots: Get a flu shot each year. Get a tetanus shot every 10 years.     Nutrition:    Eat at least 5 servings of fruits and vegetables daily.     Eat whole-grain bread, whole-wheat pasta and brown rice instead of white grains and rice.     Get adequate Calcium and Vitamin D.     Lifestyle    Exercise for at least 150 minutes a week (30 minutes a day, 5 days a week). This will help you control your weight and prevent disease.     Limit alcohol to one drink per day.     No smoking.     Wear sunscreen to prevent skin cancer.     See your dentist every six months for an exam and cleaning.     See your eye doctor every 1 to 2 years.    PHYSICAL THERAPY REFERRAL   November 1, 2019 Demographics and referral for Physical and Speech Therapy faxed to [facility] at [phone]. The fax cover sheet states, \"Please contact patient. If after 2 attempts you are unable to reach patient, please contact me at [phone] for further follow up. Thank you!\"    Pt contacted and notified.    [facility]  Phone: [phone]  Fax: [phone]  Scheduling Hours: Monday - Friday, 7 am " to 4:30 pm

## 2019-10-29 NOTE — PROGRESS NOTES
Preceptor Attestation:   Patient seen, evaluated and discussed with the resident. I have verified the content of the note, which accurately reflects my assessment of the patient and the plan of care.   Supervising Physician:  Asael Slater MD.
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
## 2019-11-04 ENCOUNTER — AMBULATORY - HEALTHEAST (OUTPATIENT)
Dept: ADMINISTRATIVE | Facility: REHABILITATION | Age: 59
End: 2019-11-04

## 2019-11-04 DIAGNOSIS — I63.40 CEREBRAL INFARCTION DUE TO EMBOLISM OF CEREBRAL ARTERY (H): ICD-10-CM

## 2020-02-25 ENCOUNTER — OFFICE VISIT (OUTPATIENT)
Dept: FAMILY MEDICINE | Facility: CLINIC | Age: 60
End: 2020-02-25
Payer: COMMERCIAL

## 2020-02-25 VITALS
TEMPERATURE: 97.6 F | SYSTOLIC BLOOD PRESSURE: 147 MMHG | BODY MASS INDEX: 18.81 KG/M2 | HEART RATE: 85 BPM | OXYGEN SATURATION: 97 % | RESPIRATION RATE: 20 BRPM | HEIGHT: 69 IN | WEIGHT: 127 LBS | DIASTOLIC BLOOD PRESSURE: 87 MMHG

## 2020-02-25 DIAGNOSIS — R64 CACHECTIC (H): ICD-10-CM

## 2020-02-25 DIAGNOSIS — R07.9 CHEST PAIN, UNSPECIFIED TYPE: ICD-10-CM

## 2020-02-25 RX ORDER — LACTOSE-REDUCED FOOD
1 LIQUID (ML) ORAL 3 TIMES DAILY
Qty: 60 BOTTLE | Refills: 3 | Status: SHIPPED | OUTPATIENT
Start: 2020-02-25 | End: 2020-04-21

## 2020-02-25 ASSESSMENT — MIFFLIN-ST. JEOR: SCORE: 1380.41

## 2020-02-25 NOTE — PROGRESS NOTES
Preceptor Attestation:  I discussed the patient with the resident.  The patient left before being seen. I have verified the content of the note, which accurately reflects my assessment of the patient and the plan of care.   Supervising Physician:  Prateek Bolton MD.
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
## 2020-02-27 NOTE — PATIENT INSTRUCTIONS
Refills given today    Continue cetirizine (ZYRTEC) 10 MG tablet; Take 1 tablet (10 mg) by mouth every evening  Dispense: 30 tablet; Refill: 1 - take this before bed if able  Start beclomethasone (QVAR) 80 MCG/ACT Inhaler; Inhale 1 puff into the lungs 2 times daily  Dispense: 1 Inhaler; Refill: 1  Start fluticasone (FLONASE) 50 MCG/ACT spray; Spray 1-2 sprays into both nostrils daily  Dispense: 3 Bottle; Refill: 1    Follow-up in 2-4 weeks for asthma check or sooner if symptoms are not improving. Go to ED for severe shortness of breath, chest pain, etc.       IV pole

## 2020-03-09 NOTE — PROGRESS NOTES
Preceptor Attestation:   Patient seen, evaluated and discussed with the resident. I have verified the content of the note, which accurately reflects my assessment of the patient and the plan of care.   Supervising Physician:  Rosales Salinas MD.

## 2020-04-02 ENCOUNTER — TELEPHONE (OUTPATIENT)
Dept: FAMILY MEDICINE | Facility: CLINIC | Age: 60
End: 2020-04-02

## 2020-04-02 NOTE — TELEPHONE ENCOUNTER
Attempted to reach out to patient during COVID19 Clinic outreach. Phone call was not answered. Voicemail message was left to reassure patient that [facility] is still open and has started implementing phone and video appointments to help patient remain safe at home. Patient was encouraged to call [facility] to schedule a visit for any current or future concerns.    Matt Mera MD

## 2020-04-21 ENCOUNTER — TELEPHONE (OUTPATIENT)
Dept: FAMILY MEDICINE | Facility: CLINIC | Age: 60
End: 2020-04-21

## 2020-05-09 ENCOUNTER — MEDICAL CORRESPONDENCE (OUTPATIENT)
Dept: HEALTH INFORMATION MANAGEMENT | Facility: CLINIC | Age: 60
End: 2020-05-09

## 2020-05-13 ENCOUNTER — TRANSFERRED RECORDS (OUTPATIENT)
Dept: HEALTH INFORMATION MANAGEMENT | Facility: CLINIC | Age: 60
End: 2020-05-13

## 2020-05-15 ENCOUNTER — MEDICAL CORRESPONDENCE (OUTPATIENT)
Dept: HEALTH INFORMATION MANAGEMENT | Facility: CLINIC | Age: 60
End: 2020-05-15

## 2020-05-18 ENCOUNTER — TELEPHONE (OUTPATIENT)
Dept: FAMILY MEDICINE | Facility: CLINIC | Age: 60
End: 2020-05-18

## 2020-05-18 NOTE — TELEPHONE ENCOUNTER
OKed verbal orders. BP noted, and that patient was asymptomatic. HHN notes that generally patient's girlfriend manages his medication so they will attempt to do a visit while she is in the phone to ensure they are all on the same page. Duloxetine filled less than a month ago w/ multiple refills.     Called patient, left message requesting they schedule hospital follow up. Routed to Dr. Mera. ./ALIE

## 2020-05-18 NOTE — TELEPHONE ENCOUNTER
[facility] phone call message- general phone call:    Reason for call: Requesting verbal orders for SN for 2 x a wk for 3 wks, 1 x a wk for 2 wks for cardio, pulmonary, nutrition, pain, medication and education 1-4 PRN. PT within the next 10 days for gait, balance and home exercise program. OT eval with in the next 10 days for ADL,congnitive assessment and adaptive equipment. 114/78 sitting BP yesterday - 91/68 standing pt denied any H/A or dizziness. No supply of Duloxetine was found in the home.      Return call needed: Yes    OK to leave a message on voice mail? Yes    Primary language: English      needed? No    Call taken on May 18, 2020 at 2:49 PM by hCad Barlow

## 2020-05-22 ENCOUNTER — TELEPHONE (OUTPATIENT)
Dept: FAMILY MEDICINE | Facility: CLINIC | Age: 60
End: 2020-05-22

## 2020-05-22 NOTE — TELEPHONE ENCOUNTER
[facility] phone call message - order or referral request for patient:     Order or referral being requested: orders      Additional Comments: OT orders1 time a week for 4 weeks.    OK to leave a message on voice mail? Yes    Primary language: English      needed? No    Call taken on May 22, 2020 at 9:58 AM by Raffi Pacheco

## 2020-05-26 ENCOUNTER — MEDICAL CORRESPONDENCE (OUTPATIENT)
Dept: HEALTH INFORMATION MANAGEMENT | Facility: CLINIC | Age: 60
End: 2020-05-26

## 2020-05-27 ENCOUNTER — TELEPHONE (OUTPATIENT)
Dept: FAMILY MEDICINE | Facility: CLINIC | Age: 60
End: 2020-05-27

## 2020-05-27 NOTE — TELEPHONE ENCOUNTER
[facility] phone call message - order or referral request for patient:     Order or referral being requested: Verbal order for extension on PT for evaluation        Additional Comments: .    OK to leave a message on voice mail? Yes    Primary language: English      needed? No    Call taken on May 27, 2020 at 8:51 AM by Terra Putnam

## 2020-06-01 ENCOUNTER — TELEPHONE (OUTPATIENT)
Dept: FAMILY MEDICINE | Facility: CLINIC | Age: 60
End: 2020-06-01

## 2020-06-01 NOTE — TELEPHONE ENCOUNTER
[facility] phone call message- general phone call:    Reason for call: verbal orders for PT: continued care 2x per week for next 3 weeks (gate strengthening, balancing, home exercise, stair training)    Call Peter Ruiz from [facility] [phone]    Return call needed: Yes    OK to leave a message on voice mail? Yes    Primary language: English      needed? No    Call taken on June 1, 2020 at 11:48 AM by Carolyn Dumas

## 2020-06-03 ENCOUNTER — MEDICAL CORRESPONDENCE (OUTPATIENT)
Dept: HEALTH INFORMATION MANAGEMENT | Facility: CLINIC | Age: 60
End: 2020-06-03

## 2020-06-09 ENCOUNTER — MEDICAL CORRESPONDENCE (OUTPATIENT)
Dept: HEALTH INFORMATION MANAGEMENT | Facility: CLINIC | Age: 60
End: 2020-06-09

## 2020-06-10 ENCOUNTER — MEDICAL CORRESPONDENCE (OUTPATIENT)
Dept: HEALTH INFORMATION MANAGEMENT | Facility: CLINIC | Age: 60
End: 2020-06-10

## 2020-06-12 ENCOUNTER — MEDICAL CORRESPONDENCE (OUTPATIENT)
Dept: HEALTH INFORMATION MANAGEMENT | Facility: CLINIC | Age: 60
End: 2020-06-12

## 2020-06-19 ENCOUNTER — MEDICAL CORRESPONDENCE (OUTPATIENT)
Dept: HEALTH INFORMATION MANAGEMENT | Facility: CLINIC | Age: 60
End: 2020-06-19

## 2020-06-24 ENCOUNTER — MEDICAL CORRESPONDENCE (OUTPATIENT)
Dept: HEALTH INFORMATION MANAGEMENT | Facility: CLINIC | Age: 60
End: 2020-06-24

## 2020-07-22 ENCOUNTER — TRANSFERRED RECORDS (OUTPATIENT)
Dept: HEALTH INFORMATION MANAGEMENT | Facility: CLINIC | Age: 60
End: 2020-07-22

## 2020-07-29 ENCOUNTER — TELEPHONE (OUTPATIENT)
Dept: FAMILY MEDICINE | Facility: CLINIC | Age: 60
End: 2020-07-29

## 2020-07-29 NOTE — TELEPHONE ENCOUNTER
[facility] phone call message- general phone call:    Reason for call:    Wanting to know if Dr. Mera will follow home care now that pt is bed bound?    Action desired:     Verbal Orders    Return call needed: Yes    OK to leave a message on voice mail? Yes    Advised patient to response may take up to 2 business days: Yes    Primary language: English      needed? No    Call taken on July 29, 2020 at 12:53 PM by Nanda Berg CMA

## 2020-07-30 ENCOUNTER — TELEPHONE (OUTPATIENT)
Dept: FAMILY MEDICINE | Facility: CLINIC | Age: 60
End: 2020-07-30

## 2020-07-30 DIAGNOSIS — C80.1 ADENOCARCINOMA (H): Primary | ICD-10-CM

## 2020-07-30 NOTE — TELEPHONE ENCOUNTER
[facility] phone call message - order or referral request for patient:     Order or referral being requested: daughter is requesting a referral for [facility] for patient who just diagnosed.       Additional Comments:     OK to leave a message on voice mail? Yes    Primary language: English      needed? No    Call taken on July 30, 2020 at 1:58 PM by Carolyn Dumas

## 2020-08-03 ENCOUNTER — OFFICE VISIT (OUTPATIENT)
Dept: FAMILY MEDICINE | Facility: CLINIC | Age: 60
End: 2020-08-03
Payer: COMMERCIAL

## 2020-08-03 VITALS
HEART RATE: 101 BPM | OXYGEN SATURATION: 98 % | WEIGHT: 125 LBS | DIASTOLIC BLOOD PRESSURE: 69 MMHG | TEMPERATURE: 98.7 F | BODY MASS INDEX: 18.33 KG/M2 | SYSTOLIC BLOOD PRESSURE: 113 MMHG | RESPIRATION RATE: 26 BRPM

## 2020-08-03 DIAGNOSIS — C80.1 ADENOCARCINOMA (H): Primary | ICD-10-CM

## 2020-08-03 NOTE — PATIENT INSTRUCTIONS
Thank you for coming in to clinic today.    I have sent through a prescription for a sling, adult diapers and Ensure nutrition shakes.     We have also placed a referral to palliative care.     Please let us know if you have any questions or concerns.       Patient Education     Palliative Care at Home  Care for People Who Have a Serious Illness  What is palliative care?  Palliative care is:    Special care for people with a serious illness.    Care focused on relief of difficult symptoms, such as pain and stress.    Care that deals with the whole person. It may benefit your physical, emotional and spiritual health.    Care that helps you think about and understand your choices for health care.    Care that helps improve the quality of life for patients and their loved ones.  Who can benefit?    Patients of any age with a serious illness.    Someone who needs holistic care to relieve suffering.    Someone who wants support to understand treatment choices and how these choices may change their life.    Patients who are troubled by the effects of one or more chronic illnesses, such as:  ? Cancer  ? Heart or lung disease  ? Liver or kidney disease  ? Dementia  ? Diabetes  ? AIDS  The palliative care team  Your care team works with your doctor to plan your care. They are nurses, social workers, doctors, spiritual care providers, home health aides, and pharmacists. The team may also include physical, occupational and speech therapists.  Choosing services  The first step is to talk to your doctor or your home care team. You may also call our palliative care coordinator. They will explain the services and help you choose the best ones for you.  Planning for your care  We will help you and your loved ones understand your diseases and your options for care and treatment. When you decide what kind of care you want, you can write down your choices. This is called a health care directive, and it tells your care team how to  provide the care you feel is best for you. It will apply to home care, hospital or any care setting.  The directive will speak for you, stating your wishes and values, even if you cannot speak for yourself in the future.  When should I consider palliative care?  Each person's situation is unique. You may want palliative care when:    You have trouble managing an illness or questions about the future due to an illness.    You feel distressed by symptoms such as pain, depression, nausea, shortness of breath, tiredness or anxiety.    You have a desire to learn more about treatments and care choices.    You are having emotional concerns, such as loss of hope.    You need help thinking through tough medical decisions.    The family disagrees about the treatment plan.    You would like a mind, body and spirit approach to care.    You have had difficult stays in the hospital.    You do not feel ready for hospice care.  Insurance  We will discuss insurance coverage with you. You may be covered by Medicare, Medical Assistance or other plans. You may also choose to pay privately for your care.  To arrange a meeting with us, call:  [facility]: [phone] or toll-free [phone]  [facility]: [phone] or toll-free [phone]  [facility]  The palliative care program is a part of your home care and provider care plans.  This program is one of a range of services that include home care, private duty nurses and aides, rehabilitation services, hospice care, and the personal emergency response system. We also offer care management services to help you understand the health care system and find health resources.  [facility]  [address] [place] [address]  [address] [place] [address]  [facility]  [address] [place] [address]  For informational purposes only. Not to replace the advice of your health care provider.  Copyright    2006 [facility]. All rights reserved. [employer] 213884 - REV 01/16.  For informational purposes only. Not to replace the advice of your health care provider.  Copyright   2018 [facility]. All rights reserved.         08/12/20  Palliative Care Referral    Palliative Care  [address] [address]  Phone: [phone]  Fax: [phone]    Demographics, referral and office note faxed to [phone].    Carolyn Dumas
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
## 2020-08-04 ENCOUNTER — DOCUMENTATION ONLY (OUTPATIENT)
Dept: FAMILY MEDICINE | Facility: CLINIC | Age: 60
End: 2020-08-04

## 2020-08-04 ENCOUNTER — MEDICAL CORRESPONDENCE (OUTPATIENT)
Dept: HEALTH INFORMATION MANAGEMENT | Facility: CLINIC | Age: 60
End: 2020-08-04

## 2020-08-04 NOTE — PROGRESS NOTES
To be completed in Nursing note:    Please reference list for forms that require a visit for completion.  Please remind patients that providers are given 3-5 business days to complete and return forms.       Form type: [facility] Physicial Face to Face    Date form received: 08/03/20     Date form completed by Physician: 08/07/20    How was form returned to patient (mailed, faxed, or at  for patient to ): Faxed back to [phone]    Date form mailed/faxed/left at  for patient and sent to HIM for scanning: Faxed 8/14/2020 at 9:08am      Once form is left for patient, faxed, or mailed PCS will then close the documentation only encounter.

## 2020-08-04 NOTE — PROGRESS NOTES
To be completed in Nursing note:    Please reference list for forms that require a visit for completion.  Please remind patients that providers are given 3-5 business days to complete and return forms.      Form type: [facility] Admission Orders    Date form received: 20    Date form completed by Physician: 20    How was form returned to patient (mailed, faxed, or at  for patient to ): Faxed back to [phone]    Date form mailed/faxed/left at  for patient and sent to HIM for [email or web address]      Once form is left for patient, faxed, or mailed PCS will then close the documentation only encounter.

## 2020-08-04 NOTE — PROGRESS NOTES
To be completed in Nursing note:    Please reference list for forms that require a visit for completion.  Please remind patients that providers are given 3-5 business days to complete and return forms.      Form type: Physician Orders    Date form received: 8/4/2020    Date form completed by Physician: 8/7/2020    How was form returned to patient (mailed, faxed, or at  for patient to ): [employer] [facility] at [phone]    Date form mailed/faxed/left at  for patient and sent to HIM for scanning: Faxed 8/14/2020 at 9:08am      Once form is left for patient, faxed, or mailed PCS will then close the documentation only encounter.

## 2020-08-04 NOTE — PROGRESS NOTES
To be completed in Nursing note:    Please reference list for forms that require a visit for completion.  Please remind patients that providers are given 3-5 business days to complete and return forms.      Form type: Drug Interaction Physician Order    Date form received: 8/3/2020    Date form completed by Physician: 8/7/202    How was form returned to patient (mailed, faxed, or at  for patient to ): Fax to [employer] [facility] at [phone]    Date form mailed/faxed/left at  for patient and sent to HIM for scanning: Faxed 8/14/2020 at 9:08am      Once form is left for patient, faxed, or mailed PCS will then close the documentation only encounter.

## 2020-08-05 ENCOUNTER — TELEPHONE (OUTPATIENT)
Dept: ONCOLOGY | Facility: CLINIC | Age: 60
End: 2020-08-05

## 2020-08-05 ENCOUNTER — TELEPHONE (OUTPATIENT)
Dept: FAMILY MEDICINE | Facility: CLINIC | Age: 60
End: 2020-08-05

## 2020-08-05 NOTE — TELEPHONE ENCOUNTER
Oncology/Surgical Oncology Referral Request:     Specialty Requested: Medical Oncology     Referring Provider: Matt Mera MD     Referring Clinic/Organization: [facility]    Records location: [facility]     Requested Provider (if specified): Not Specified      Please review referral that did not fall to the [facility]. Please let us know once reviewed to call Birgit Hayden at [phone].

## 2020-08-05 NOTE — TELEPHONE ENCOUNTER
[facility] phone call message- general phone call:    Reason for call:     Needing orders for Mohsen Walker    Action desired:     None    Return call needed: Yes    OK to leave a message on voice mail? Yes    Advised patient to response may take up to 2 business days: Yes    Primary language: English      needed? No    Call taken on August 5, 2020 at 4:42 PM by Nanda Berg CMA

## 2020-08-06 ENCOUNTER — PRE VISIT (OUTPATIENT)
Dept: ONCOLOGY | Facility: CLINIC | Age: 60
End: 2020-08-06

## 2020-08-06 NOTE — PROGRESS NOTES
Preceptor Attestation:    Patient seen and evaluated in person. I discussed the patient with the resident. I have verified the content of the note, which accurately reflects my assessment of the patient and the plan of care.   Supervising Physician:  Froilan Salgado MD.

## 2020-08-06 NOTE — TELEPHONE ENCOUNTER
RECORDS STATUS - ALL OTHER DIAGNOSIS      RECORDS RECEIVED FROM: INTERNAL AND CE   DATE RECEIVED: 08/06/2020   NOTES STATUS DETAILS   OFFICE NOTE from referring provider YES IN EPIC   OFFICE NOTE from medical oncologist NA    DISCHARGE SUMMARY from hospital NA    DISCHARGE REPORT from the ER YES CE-08/01/2020   OPERATIVE REPORT YES CE-07/23/2020   MEDICATION LIST YES CE   CLINICAL TRIAL TREATMENTS TO DATE NA    LABS YES CE   PATHOLOGY REPORTS YES CE-07/23/2020   ANYTHING RELATED TO DIAGNOSIS NA    GENONOMIC TESTING NA    TYPE:     IMAGING (NEED IMAGES & REPORT)     CT SCANS YES IN PACS   MRI NA    MAMMO NA    ULTRASOUND NA    PET NA

## 2020-08-10 ENCOUNTER — VIRTUAL VISIT (OUTPATIENT)
Dept: ONCOLOGY | Facility: CLINIC | Age: 60
End: 2020-08-10
Attending: STUDENT IN AN ORGANIZED HEALTH CARE EDUCATION/TRAINING PROGRAM
Payer: COMMERCIAL

## 2020-08-10 DIAGNOSIS — C80.1 ADENOCARCINOMA (H): ICD-10-CM

## 2020-08-10 PROCEDURE — 99203 OFFICE O/P NEW LOW 30 MIN: CPT | Mod: 95 | Performed by: INTERNAL MEDICINE

## 2020-08-10 PROCEDURE — 40001009 ZZH VIDEO/TELEPHONE VISIT; NO CHARGE

## 2020-08-10 RX ORDER — OXYCODONE AND ACETAMINOPHEN 5; 325 MG/1; MG/1
1 TABLET ORAL EVERY 6 HOURS PRN
COMMUNITY
End: 2020-10-14
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
## 2020-08-11 ENCOUNTER — DOCUMENTATION ONLY (OUTPATIENT)
Dept: ONCOLOGY | Facility: CLINIC | Age: 60
End: 2020-08-11

## 2020-08-14 ENCOUNTER — MEDICAL CORRESPONDENCE (OUTPATIENT)
Dept: HEALTH INFORMATION MANAGEMENT | Facility: CLINIC | Age: 60
End: 2020-08-14

## 2020-08-14 DIAGNOSIS — C80.1 ADENOCARCINOMA (H): Primary | ICD-10-CM

## 2020-08-17 ENCOUNTER — DOCUMENTATION ONLY (OUTPATIENT)
Dept: FAMILY MEDICINE | Facility: CLINIC | Age: 60
End: 2020-08-17

## 2020-08-17 ENCOUNTER — MEDICAL CORRESPONDENCE (OUTPATIENT)
Dept: HEALTH INFORMATION MANAGEMENT | Facility: CLINIC | Age: 60
End: 2020-08-17

## 2020-08-17 NOTE — TELEPHONE ENCOUNTER
Request for DME order for walker.  DME cannot be ordered without a face-to-face evaluation to document the need.    - pt needs appointment with me if they need this order. Visit can be in-person or virtual (video preferred).    Cheri Barksdale MD

## 2020-08-17 NOTE — PROGRESS NOTES
To be completed in Nursing note:    Please reference list for forms that require a visit for completion.  Please remind patients that providers are given 3-5 business days to complete and return forms.      Form type: Physician Orders (7/29/2020 & 8/4/2020) and Drug Interaction Physician Order    Date form received: 8/4/2020    Date form completed by Physician:     How was form returned to patient (mailed, faxed, or at  for patient to ): Fax to [facility] [facility] at [phone]    Date form mailed/faxed/left at  for patient and sent to HIM for scanning: Faxed 8/17/2020      Once form is left for patient, faxed, or mailed PCS will then close the documentation only encounter.

## 2020-08-18 NOTE — TELEPHONE ENCOUNTER
Called pt, unable to leave voice message due to mail box is full. Will try again later. Suri Lovett CMA

## 2020-08-19 ENCOUNTER — MEDICAL CORRESPONDENCE (OUTPATIENT)
Dept: HEALTH INFORMATION MANAGEMENT | Facility: CLINIC | Age: 60
End: 2020-08-19

## 2020-08-21 ENCOUNTER — DOCUMENTATION ONLY (OUTPATIENT)
Dept: FAMILY MEDICINE | Facility: CLINIC | Age: 60
End: 2020-08-21

## 2020-08-21 NOTE — PROGRESS NOTES
To be completed in Nursing note:    Please reference list for forms that require a visit for completion.  Please remind patients that providers are given 3-5 business days to complete and return forms.      Form type: Plan of Care cert. period 7/31/2020-9/28/2020    Date form received: 8/20/2020    Date form completed by Physician: 8/21/2020    How was form returned to patient (mailed, faxed, or at  for patient to ):Fax to [facility] at [phone]    Date form mailed/faxed/left at  for patient and sent to HIM for scanning: Faxed 8/21/2020 at 3:02pm      Once form is left for patient, faxed, or mailed PCS will then close the documentation only encounter.

## 2020-08-24 ENCOUNTER — OFFICE VISIT (OUTPATIENT)
Dept: FAMILY MEDICINE | Facility: CLINIC | Age: 60
End: 2020-08-24
Payer: COMMERCIAL

## 2020-08-24 VITALS
OXYGEN SATURATION: 99 % | RESPIRATION RATE: 20 BRPM | DIASTOLIC BLOOD PRESSURE: 72 MMHG | HEART RATE: 95 BPM | WEIGHT: 113.6 LBS | TEMPERATURE: 98.3 F | BODY MASS INDEX: 16.65 KG/M2 | SYSTOLIC BLOOD PRESSURE: 137 MMHG

## 2020-08-24 DIAGNOSIS — R35.0 URINARY FREQUENCY: ICD-10-CM

## 2020-08-24 DIAGNOSIS — M54.42 CHRONIC BILATERAL LOW BACK PAIN WITH BILATERAL SCIATICA: ICD-10-CM

## 2020-08-24 DIAGNOSIS — M54.41 CHRONIC BILATERAL LOW BACK PAIN WITH BILATERAL SCIATICA: ICD-10-CM

## 2020-08-24 DIAGNOSIS — I69.954 FLACCID HEMIPLEGIA OF LEFT NONDOMINANT SIDE AS LATE EFFECT OF CEREBROVASCULAR DISEASE, UNSPECIFIED CEREBROVASCULAR DISEASE TYPE (H): ICD-10-CM

## 2020-08-24 DIAGNOSIS — J45.40 MODERATE PERSISTENT ASTHMA, UNSPECIFIED WHETHER COMPLICATED: ICD-10-CM

## 2020-08-24 DIAGNOSIS — C79.51 ADENOCARCINOMA METASTATIC TO RIGHT FEMUR (H): Primary | ICD-10-CM

## 2020-08-24 DIAGNOSIS — G89.29 CHRONIC BILATERAL LOW BACK PAIN WITH BILATERAL SCIATICA: ICD-10-CM

## 2020-08-24 RX ORDER — OXYCODONE HYDROCHLORIDE 10 MG/1
10 TABLET ORAL EVERY 6 HOURS PRN
Qty: 60 TABLET | Refills: 0 | Status: SHIPPED | OUTPATIENT
Start: 2020-08-24 | End: 2020-10-14

## 2020-08-24 RX ORDER — ALBUTEROL SULFATE 90 UG/1
2 AEROSOL, METERED RESPIRATORY (INHALATION) EVERY 6 HOURS PRN
Qty: 3 INHALER | Refills: 1 | Status: SHIPPED | OUTPATIENT
Start: 2020-08-24 | End: 2021-04-14

## 2020-08-24 RX ORDER — ACETAMINOPHEN 325 MG/1
325-650 TABLET ORAL EVERY 6 HOURS PRN
Qty: 90 TABLET | Refills: 1 | Status: ON HOLD | OUTPATIENT
Start: 2020-08-24 | End: 2024-04-16

## 2020-08-24 ASSESSMENT — ENCOUNTER SYMPTOMS
CHILLS: 0
COUGH: 0
RHINORRHEA: 0
SEIZURES: 0
APPETITE CHANGE: 0
ABDOMINAL PAIN: 0
COLOR CHANGE: 0
HEADACHES: 0
FEVER: 0
FREQUENCY: 1
DIZZINESS: 0
DYSPHORIC MOOD: 0
BACK PAIN: 0
SORE THROAT: 0
VOMITING: 0
SHORTNESS OF BREATH: 0
PALPITATIONS: 0
ARTHRALGIAS: 1
DYSURIA: 0

## 2020-08-24 NOTE — PROGRESS NOTES
"[facility]         SUBJECTIVE       Peter Perez is a 60 year old  male with a PMH significant for metastatic adenocarcinoma, asthma, chronic lower back pain, inferior CVA who presents to clinic today for:   Chief Complaint   Patient presents with     Hospital F/U     TCM     Refill Request     oxyCODONE-acetaminophen (PERCOCET) 5-325 MG tablet     Other     Check toe, toe turns black     1) Pt presents with daughter Flory who helps take care of pt along with her sister and home health services.  The patient currently lives with mother for children who is the main caretaker, but could not attend the visit today.  Pt was hospitalized for bloody stools on 8/15/2020 and received 1 unit PRBCs and had a colonoscopy.  Patient has been out of the hospital for about 1 week.  Reports that bloody stools have since resolved and bowel movements are normal now.  Denies abdominal pain, nausea, vomiting, or hematochezia.        2) Has been using up the oxycodone pretty fast by taking 2 tablets q4-6h for R thigh pain, and R hip pain for the past 2-3 weeks.  He was taking oxycodone prescribed by his oncologist in [place], however they are  in the process of changing oncoloist to [facility].  The oncologist referred them to palliative care and still in the process of setting up care with the team.  Had be hospitalized for  Bloody stools which have since resolved.     3) Complains that toe nail is black and pt says it's been black for a long time - \"probably since I was born.\" Bringing it up today because main family member just noticed it and is worried.    4) Has been urinating often for a long time - urgency as well - states he urinites >20 times a day.  Main caretaker is not at visit today to corroborate this.  Patient reports that this is been the case since he last had a urinary tract infection about a month ago.  Denies dysuria, hematuria.    5)  Pt complains of shoulder pain.  Has paralysis of LUE.  Says he " had some relief in the past with wearing a sling.     PMH, Medications and Allergies were reviewed and updated as needed.      REVIEW OF SYSTEMS     Review of Systems   Constitutional: Negative for appetite change, chills and fever.   HENT: Negative for rhinorrhea and sore throat.    Eyes: Negative for visual disturbance.   Respiratory: Negative for cough and shortness of breath.         Endorses dyspnea on exertion   Cardiovascular: Negative for chest pain and palpitations.   Gastrointestinal: Negative for abdominal pain and vomiting.   Genitourinary: Positive for frequency and urgency. Negative for dysuria.   Musculoskeletal: Positive for arthralgias and gait problem. Negative for back pain.        Shoulder pain from LUE   Skin: Negative for color change and rash.   Neurological: Negative for dizziness, seizures, syncope and headaches.   Psychiatric/Behavioral: Negative for dysphoric mood.   All other systems reviewed and are negative.        OBJECTIVE     Blood pressure 137/72, pulse 95, temperature 98.3  F (36.8  C), temperature source Oral, resp. rate 20, weight 51.5 kg (113 lb 9.6 oz), SpO2 99 %.   Body mass index is 16.65 kg/m .    Physical Exam  Constitutional:       General: He is not in acute distress.     Appearance: He is underweight. He is not ill-appearing or diaphoretic.   HENT:      Head: Normocephalic and atraumatic.      Mouth/Throat:      Comments: Deferred exam due to pandemic  Eyes:      General: No scleral icterus.     Extraocular Movements: Extraocular movements intact.      Conjunctiva/sclera: Conjunctivae normal.   Cardiovascular:      Rate and Rhythm: Normal rate and regular rhythm.      Pulses: Normal pulses.      Heart sounds: No murmur. No friction rub. No gallop.    Pulmonary:      Effort: Pulmonary effort is normal. No respiratory distress.      Breath sounds: Normal breath sounds and air entry. No stridor. No wheezing or rhonchi.   Abdominal:      General: Bowel sounds are normal.  There is no distension.      Palpations: Abdomen is soft.      Tenderness: There is no abdominal tenderness. There is no guarding.   Musculoskeletal: Normal range of motion.      Left upper arm: He exhibits deformity.      Right foot: No deformity or bunion.      Left foot: No deformity or bunion.      Comments: LUE paralyzed, atrophic   Feet:      Right foot:      Toenail Condition: Right toenails are long.      Left foot:      Toenail Condition: Left toenails are long.      Comments: 5th digit toenails darker in color, though other nails with similar coloration, benign appearing.  Skin:     General: Skin is warm and dry.      Findings: No lesion or rash.   Neurological:      Mental Status: He is alert and oriented to person, place, and time.      Motor: Atrophy present.   Psychiatric:         Mood and Affect: Mood normal.         Behavior: Behavior normal. Behavior is cooperative.       No results found for any visits on 08/24/20.    ASSESSMENT AND PLAN     Peter was seen today for hospital f/u, refill request and other.    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)  -     oxyCODONE (ROXICODONE) 10 MG tablet; Take 1 tablet (10 mg) by mouth every 6 hours as needed for severe pain  -    Encouraged pt to take acetaminophen for pain as well    Moderate persistent asthma, unspecified whether complicated  -     Refilled: albuterol (PROAIR HFA/PROVENTIL HFA/VENTOLIN HFA) 108 (90 Base) MCG/ACT inhaler; Inhale 2 puffs into the lungs every 6 hours as needed for shortness of breath / dyspnea or wheezing    Chronic bilateral low back pain with bilateral sciatica  -     Refilled: acetaminophen (TYLENOL) 325 MG tablet; Take 1-2 tablets (325-650 mg) by mouth every 6 hours as needed for mild pain, headaches or pain    Urinary frequency  Comments:  Previously treated acute cystitis w/ hematuria  8/1/20 with ciprofloxacin. Pt reports ongoing frequency.  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    The patient was seen by, and discussed with, Dr. Jg Beal who agrees with the plan.  -----  Cheri Barksdale MD  PGY-2  [facility]  Office: [phone]  Pager: [phone]      DME (Durable Medical Equipment) Orders and Documentation  Orders Placed This Encounter   Procedures     Neck/Shoulder/Back/Abdomen Order      The patient was assessed and it was determined the patient is in need of the following listed DME Supplies/Equipment. Please complete supporting documentation below to demonstrate medical necessity.      Neck/Shoulder/Back Bracing Supplies Documentation  Patient requires the use of the ordered bracing device due to following medical need/condition: Left upper extremity hemiplegia; Left shoulder pain

## 2020-08-25 ENCOUNTER — TRANSFERRED RECORDS (OUTPATIENT)
Dept: HEALTH INFORMATION MANAGEMENT | Facility: CLINIC | Age: 60
End: 2020-08-25

## 2020-08-26 PROCEDURE — 00000346 ZZHCL STATISTIC REVIEW OUTSIDE SLIDES TC 88321

## 2020-08-27 ENCOUNTER — MEDICAL CORRESPONDENCE (OUTPATIENT)
Dept: HEALTH INFORMATION MANAGEMENT | Facility: CLINIC | Age: 60
End: 2020-08-27

## 2020-08-28 ENCOUNTER — TRANSFERRED RECORDS (OUTPATIENT)
Dept: HEALTH INFORMATION MANAGEMENT | Facility: CLINIC | Age: 60
End: 2020-08-28

## 2020-08-28 LAB — COPATH REPORT: NORMAL

## 2020-09-01 ENCOUNTER — DOCUMENTATION ONLY (OUTPATIENT)
Dept: FAMILY MEDICINE | Facility: CLINIC | Age: 60
End: 2020-09-01

## 2020-09-01 PROCEDURE — 00000346 ZZHCL STATISTIC REVIEW OUTSIDE SLIDES TC 88321: Performed by: INTERNAL MEDICINE

## 2020-09-01 NOTE — PROGRESS NOTES
To be completed in Nursing note:    Please reference list for forms that require a visit for completion.  Please remind patients that providers are given 3-5 business days to complete and return forms.      Form type: Physician Orders    Date form received:  8/31/2020    Date form completed by Physician: 9/3/2020    How was form returned to patient (mailed, faxed, or at  for patient to ): Fax to [employer] [facility] at [phone]    Date form mailed/faxed/left at  for patient and sent to HIM for scanning: Faxed 9/8/2020 at 11:46am      Once form is left for patient, faxed, or mailed PCS will then close the documentation only encounter.

## 2020-09-03 ENCOUNTER — TELEPHONE (OUTPATIENT)
Dept: FAMILY MEDICINE | Facility: CLINIC | Age: 60
End: 2020-09-03

## 2020-09-03 DIAGNOSIS — C79.51 ADENOCARCINOMA METASTATIC TO RIGHT FEMUR (H): Primary | ICD-10-CM

## 2020-09-03 NOTE — TELEPHONE ENCOUNTER
[facility] phone call message- general phone call:    Reason for call: She needed a RX for a ya walker she sent a note up about 4 weeks ago and the patient still has not received anything.    Action desired: call back.    Return call needed: Yes    OK to leave a message on voice mail? Yes    Advised patient to response may take up to 2 business days: Yes    Primary language: English      needed? No    Call taken on September 3, 2020 at 9:17 AM by Raffi Pacheco

## 2020-09-03 NOTE — TELEPHONE ENCOUNTER
[facility] phone call message- medication clarification/question:    Full Medication Name:     Over toilet commode    Question:     Requesting above. If approved sent to [facility] fax [phone]     Yes    OK to leave a message on voice mail? Yes    Primary language: English      needed? No    Call taken on September 3, 2020 at 1:12 PM by Nanda Berg CMA

## 2020-09-03 NOTE — TELEPHONE ENCOUNTER
DME (Durable Medical Equipment) Orders and Documentation  Orders Placed This Encounter   Procedures     Walker Order      The patient was assessed and it was determined the patient is in need of the following listed DME Supplies/Equipment. Please complete supporting documentation below to demonstrate medical necessity.      DME All Other Item(s) Documentation    List reason for need and supporting documentation for medical necessity below for each DME item.     1. Pt with metastatic cancer to femur and severe hip pain. Unable to ambulate without assistance.    - Cheri Barksdale MD

## 2020-09-04 ENCOUNTER — MEDICAL CORRESPONDENCE (OUTPATIENT)
Dept: HEALTH INFORMATION MANAGEMENT | Facility: CLINIC | Age: 60
End: 2020-09-04

## 2020-09-04 LAB — COPATH REPORT: NORMAL

## 2020-09-04 NOTE — TELEPHONE ENCOUNTER
Order faxed to [employer] (f: [phone]). Left message for [facility] relaying this information, instructions to call with questions. ./LR

## 2020-09-08 ENCOUNTER — TELEPHONE (OUTPATIENT)
Dept: FAMILY MEDICINE | Facility: CLINIC | Age: 60
End: 2020-09-08

## 2020-09-08 DIAGNOSIS — C79.51 ADENOCARCINOMA METASTATIC TO RIGHT FEMUR (H): ICD-10-CM

## 2020-09-08 NOTE — TELEPHONE ENCOUNTER
[facility] phone call message- patient requesting a refill:    Full Medication Name: oxyCODONE    Dose: 10 MG     Pharmacy confirmed as       [id number] - [place], [place] - [address] [place] [address] AT [facility] [address]  [address] [place] [address]  Phone: [phone] Fax: [phone]  : Yes    Additional Comments:      OK to leave a message on voice mail? Yes       Primary language: English      needed? No    Call taken on September 8, 2020 at 2:13 PM by Evens Pacheco

## 2020-09-09 ENCOUNTER — DOCUMENTATION ONLY (OUTPATIENT)
Dept: FAMILY MEDICINE | Facility: CLINIC | Age: 60
End: 2020-09-09

## 2020-09-09 NOTE — PROGRESS NOTES
To be completed in Nursing note:    Please reference list for forms that require a visit for completion.  Please remind patients that providers are given 3-5 business days to complete and return forms.      Form type: Physical Therapy Discharge Summary    Date form received: 9/8/2020    Date form completed by Physician: 9/11/2020    How was form returned to patient (mailed, faxed, or at  for patient to ): Fax to [employer] [facility] at [phone]    Date form mailed/faxed/left at  for patient and sent to HIM for scanning:      Once form is left for patient, faxed, or mailed PCS will then close the documentation only encounter.

## 2020-09-09 NOTE — PROGRESS NOTES
To be completed in Nursing note:    Please reference list for forms that require a visit for completion.  Please remind patients that providers are given 3-5 business days to complete and return forms.      Form type: Plan of Care Cert. period 7/31/2020-9/28/2020    Date form received: 9/9/2020    Date form completed by Physician: 9/11/2020    How was form returned to patient (mailed, faxed, or at  for patient to ): Fax to [employer] at [phone]    Date form mailed/faxed/left at  for patient and sent to HIM for scanning:      Once form is left for patient, faxed, or mailed PCS will then close the documentation only encounter.

## 2020-09-11 ENCOUNTER — VIRTUAL VISIT (OUTPATIENT)
Dept: FAMILY MEDICINE | Facility: CLINIC | Age: 60
End: 2020-09-11
Payer: COMMERCIAL

## 2020-09-11 ENCOUNTER — MEDICAL CORRESPONDENCE (OUTPATIENT)
Dept: HEALTH INFORMATION MANAGEMENT | Facility: CLINIC | Age: 60
End: 2020-09-11

## 2020-09-11 ENCOUNTER — TELEPHONE (OUTPATIENT)
Dept: FAMILY MEDICINE | Facility: CLINIC | Age: 60
End: 2020-09-11

## 2020-09-11 DIAGNOSIS — C79.51 ADENOCARCINOMA METASTATIC TO RIGHT FEMUR (H): Primary | ICD-10-CM

## 2020-09-11 RX ORDER — OXYCODONE HYDROCHLORIDE 10 MG/1
10 TABLET ORAL EVERY 6 HOURS PRN
Qty: 60 TABLET | Refills: 0 | OUTPATIENT
Start: 2020-09-11

## 2020-09-11 NOTE — TELEPHONE ENCOUNTER
[facility] phone call message- general phone call:    Reason for call: He needs a brandy back re his refills.    Action desired: call back.    Return call needed: Yes    OK to leave a message on voice mail? Yes    Advised patient to response may take up to 2 business days: Yes    Primary language: English      needed? No    Call taken on September 11, 2020 at 1:07 PM by Raffi Pacheco

## 2020-09-11 NOTE — PROGRESS NOTES
"Family Medicine Telephone Visit Note         Telephone Visit Consent   Patient was verbally read the following and verbal consent was obtained.    \"Telephone visits are billed at different rates depending on your insurance coverage. During this emergency period, for some insurers they may be billed the same as an in-person visit.  Please reach out to your insurance provider with any questions.  If during the course of the call the physician/provider feels a telephone visit is not appropriate, you will not be charged for this service.\"    Name person giving consent:  Patient   Date verbal consent given:  9/11/2020  Time verbal consent given:  1:41 PM    Chief Complaint   Patient presents with     Refill Request     Pt would like his pain medication refilled today.     Medication Reconciliation     Complete.          HPI   Patients name: Peter  Appointment start time:  1:40 PM    Patient presents via telephone visit with chief complaint of medication refill.     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 He has metastasis to his right femur. He is currently undergoing radiation therapy for this. He is having right hip pain related to the cancer. He has right hip pain that his 7 out of 10 currently. Sometimes the pain is 9 out of 10 severity. He has been prescribed oxycodone 10 mg tablets for the pain. The oxycodone helps to relieve the pain. He is taking the oxycodone every 4-6 hours for pain currently.     He would like a refill of his oxycodone. However, the patient does report during the visit that a family member just spoke with the oncology clinic and that the oncologist provided a new refill of the oxycodone. Upon review of the [place] Prescription Drug Monitoring Database, oxycodone was last filled on 8/24/2020. This prescription was for 60 tablets of oxycodone 10 mg to last for 15 days.     The patient is also using Tylenol, diclofenac, gabapentin and duloxetine for " pain. He finds that these medications are not very effective.    The patient is interested in meeting with Palliative Care to discuss ongoing pain management and goals or care.     He has a walker due to difficulty with ambulation. He has difficulty ambulating due to both pain and residual hemiparesis from prior stroke. He would like a platform for his walker. He would also like a bedside commode as he has difficulty going from the bed to the bathroom due to pain or difficulty with ambulation.    Current Outpatient Medications   Medication Sig Dispense Refill     acetaminophen (TYLENOL) 325 MG tablet Take 1-2 tablets (325-650 mg) by mouth every 6 hours as needed for mild pain, headaches or pain 90 tablet 1     albuterol (PROAIR HFA/PROVENTIL HFA/VENTOLIN HFA) 108 (90 Base) MCG/ACT inhaler Inhale 2 puffs into the lungs every 6 hours as needed for shortness of breath / dyspnea or wheezing 3 Inhaler 1     aspirin (ASA) 325 MG EC tablet Take 1 tablet (325 mg) by mouth daily (Patient not taking: Reported on 8/3/2020) 90 tablet 3     atorvastatin (LIPITOR) 40 MG tablet Take 1 tablet (40 mg) by mouth daily (Patient not taking: Reported on 8/3/2020) 90 tablet 3     cetirizine (ZYRTEC) 10 MG tablet Take 1 tablet (10 mg) by mouth every evening (Patient not taking: Reported on 8/3/2020) 90 tablet 3     dextromethorphan (TUSSIN COUGH) 15 MG/5ML syrup Take 10 mLs (30 mg) by mouth 4 times daily as needed for cough (Patient not taking: Reported on 2/25/2020) 118 mL 0     diclofenac (VOLTAREN) 1 % topical gel Place 2 g onto the skin 4 times daily (Patient not taking: Reported on 8/10/2020) 100 g 3     DULoxetine (CYMBALTA) 30 MG capsule Take 1 capsule (30 mg) by mouth 2 times daily 90 capsule 3     fluticasone (FLONASE) 50 MCG/ACT nasal spray Spray 1-2 sprays into both nostrils daily 9.9 mL 3     losartan (COZAAR) 50 MG tablet Take 1 tablet (50 mg) by mouth daily 90 tablet 3     Nutritional Supplements (ENSURE NUTRITION SHAKE) LIQD  "Take 1 Bottle by mouth 3 times daily 60 Bottle 3     order for DME Equipment being ordered: ensure    Take one can by mouth 4 times daily 100 Can 3     oxyCODONE (ROXICODONE) 10 MG tablet Take 1 tablet (10 mg) by mouth every 6 hours as needed for severe pain 60 tablet 0     oxyCODONE-acetaminophen (PERCOCET) 5-325 MG tablet Take 1 tablet by mouth every 6 hours as needed for severe pain       Allergies   Allergen Reactions     Lisinopril Swelling     Seasonal Allergies Unknown     Watery eyes           Review of Systems:     CONSTITUTIONAL: No fever or chills.   RESPIRATORY: No cough, wheezes, or shortness of breath.  CARDIOVASCULAR: No chest pain or chest tightness.   GASTROINTESTINAL: No nausea, vomiting, reflux, diarrhea, or constipation. No abdominal pain, cramping, or bloating.  MUSCULOSKELETAL: See above.          Physical Exam:     There were no vitals taken for this visit.  Estimated body mass index is 16.65 kg/m  as calculated from the following:    Height as of 2/25/20: 1.759 m (5' 9.25\").    Weight as of 8/24/20: 51.5 kg (113 lb 9.6 oz).    Exam:  Constitutional: alert, no distress and cooperative  Psychiatric: mentation appears normal, affect flat, and judgment and insight intact        Assessment and Plan     1. 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)  Recently diagnosed in July 2020 with metastatic adenocarcinoma. Thought to be of lung origin. Has metastasis to right femur, which is causing significant right hip pain. Current pain medications include oxycodone, gabapentin, acetaminophen, diclofenac, and duloxetine. Patient requested refill of oxycodone, which he finds helpful for pain. During the encounter today, the patient's daughter informed me that his oncologist had just refilled the oxycodone. Regular follow-up with oncology was strongly encouraged. Will let oncology determine optimal pain regimen at this time. Does not appear to have Narcan prescription so will order this today. Will provide " referral to Palliative Care for ongoing pain management and goals of care discussion. Will order walker platform and bedside commode given patient has significant difficultly with ambulation given current metastatic bone pain and residual hemiplegia from prior stroke.  - PALLIATIVE CARE REFERRAL  - Miscellaneous Order for DME - ONLY FOR DME  - Miscellaneous Order for DME - ONLY FOR DME  - naloxone (NARCAN) 4 MG/0.1ML nasal spray; Spray 1 spray (4 mg) into one nostril alternating nostrils as needed for opioid reversal every 2-3 minutes until assistance arrives  Dispense: 0.2 mL; Refill: 3    Refilled medications that would be required in the next 3 months.     After Visit Information:  Will print and mail AVS     Appointment end time: 1:52 PM  This is a telephone visit that took 12 minutes.    Clinician location:  [facility]    Return to clinic in 4 weeks for follow-up of metastatic adenocarcinoma or sooner if develops new or worsening symptoms.    Patient was discussed with attending physician, Dr. Marielos Riggins MD, who agrees with the assessment and plan.    Matt Mera, PGY-3  [facility]  09/11/20      DME (Durable Medical Equipment) Orders and Documentation  Orders Placed This Encounter   Procedures     Miscellaneous Order for DME - ONLY FOR DME     Miscellaneous Order for DME - ONLY FOR DME      The patient was assessed and it was determined the patient is in need of the following listed DME Supplies/Equipment. Please complete supporting documentation below to demonstrate medical necessity.      DME All Other Item(s) Documentation    List reason for need and supporting documentation for medical necessity below for each DME item.     1. Walker platform - patient has significant difficultly with ambulation given current metastatic bone pain and residual hemiplegia from prior stroke and is requiring a walker in order to ambulate    2. Bedside commode -  patient has significant difficultly with ambulation given current metastatic bone pain and residual hemiplegia from prior stroke and is requiring a walker in order to ambulate. He cannot get from his bed to the bathroom without significant difficulty and requires a closer and more easily accessible toilet

## 2020-09-11 NOTE — PROGRESS NOTES
Preceptor Attestation:    I talked to the patient on the phone and discussed the patient with the resident. I have verified the content of the note, which accurately reflects my assessment of the patient and the plan of care.   Supervising Physician:  Marielos Riggins MD.

## 2020-09-12 ENCOUNTER — TELEPHONE (OUTPATIENT)
Dept: FAMILY MEDICINE | Facility: CLINIC | Age: 60
End: 2020-09-12

## 2020-09-14 NOTE — PATIENT INSTRUCTIONS
Palliative Care Referral     Palliative Care  [facility] [address] [address]  Phone: [phone]  Fax: [phone]     Demographics, referral and office note faxed to [phone].    I have called and left a VM with Gabriella, who worked in Palliative Care. I asked her a return call to help get this patient connected as this is the second time this patient has had this referral.     Carolyn Dumas

## 2020-09-14 NOTE — TELEPHONE ENCOUNTER
Patient would like to know if they need to have follow up appointment on 9/16/2020 when they came into clinic on Friday 9/11/2020. Please advise.

## 2020-09-15 DIAGNOSIS — M54.42 CHRONIC BILATERAL LOW BACK PAIN WITH BILATERAL SCIATICA: ICD-10-CM

## 2020-09-15 DIAGNOSIS — G89.29 CHRONIC BILATERAL LOW BACK PAIN WITH BILATERAL SCIATICA: ICD-10-CM

## 2020-09-15 DIAGNOSIS — M54.41 CHRONIC BILATERAL LOW BACK PAIN WITH BILATERAL SCIATICA: ICD-10-CM

## 2020-09-15 RX ORDER — DULOXETIN HYDROCHLORIDE 30 MG/1
30 CAPSULE, DELAYED RELEASE ORAL 2 TIMES DAILY
Qty: 90 CAPSULE | Refills: 3 | Status: SHIPPED | OUTPATIENT
Start: 2020-09-15 | End: 2020-10-14

## 2020-09-22 ENCOUNTER — MEDICAL CORRESPONDENCE (OUTPATIENT)
Dept: HEALTH INFORMATION MANAGEMENT | Facility: CLINIC | Age: 60
End: 2020-09-22

## 2020-10-08 ENCOUNTER — TRANSFERRED RECORDS (OUTPATIENT)
Dept: HEALTH INFORMATION MANAGEMENT | Facility: CLINIC | Age: 60
End: 2020-10-08

## 2020-10-13 ENCOUNTER — MEDICAL CORRESPONDENCE (OUTPATIENT)
Dept: HEALTH INFORMATION MANAGEMENT | Facility: CLINIC | Age: 60
End: 2020-10-13

## 2020-10-14 ENCOUNTER — OFFICE VISIT (OUTPATIENT)
Dept: PHARMACY | Facility: CLINIC | Age: 60
End: 2020-10-14
Payer: COMMERCIAL

## 2020-10-14 ENCOUNTER — OFFICE VISIT (OUTPATIENT)
Dept: FAMILY MEDICINE | Facility: CLINIC | Age: 60
End: 2020-10-14
Payer: COMMERCIAL

## 2020-10-14 VITALS
TEMPERATURE: 98.2 F | HEART RATE: 77 BPM | OXYGEN SATURATION: 99 % | RESPIRATION RATE: 17 BRPM | WEIGHT: 111.8 LBS | SYSTOLIC BLOOD PRESSURE: 128 MMHG | BODY MASS INDEX: 16.39 KG/M2 | DIASTOLIC BLOOD PRESSURE: 73 MMHG

## 2020-10-14 DIAGNOSIS — R11.2 NAUSEA AND VOMITING, INTRACTABILITY OF VOMITING NOT SPECIFIED, UNSPECIFIED VOMITING TYPE: ICD-10-CM

## 2020-10-14 DIAGNOSIS — Z09 HOSPITAL DISCHARGE FOLLOW-UP: Primary | ICD-10-CM

## 2020-10-14 DIAGNOSIS — E87.5 HYPERKALEMIA: ICD-10-CM

## 2020-10-14 DIAGNOSIS — J45.40 MODERATE PERSISTENT ASTHMA, UNSPECIFIED WHETHER COMPLICATED: ICD-10-CM

## 2020-10-14 DIAGNOSIS — M25.569 ARTHRALGIA OF LOWER LEG, UNSPECIFIED LATERALITY: ICD-10-CM

## 2020-10-14 DIAGNOSIS — M54.42 CHRONIC BILATERAL LOW BACK PAIN WITH BILATERAL SCIATICA: ICD-10-CM

## 2020-10-14 DIAGNOSIS — J30.2 SEASONAL ALLERGIC RHINITIS, UNSPECIFIED TRIGGER: ICD-10-CM

## 2020-10-14 DIAGNOSIS — M54.41 CHRONIC BILATERAL LOW BACK PAIN WITH BILATERAL SCIATICA: ICD-10-CM

## 2020-10-14 DIAGNOSIS — E87.5 HYPERKALEMIA: Primary | ICD-10-CM

## 2020-10-14 DIAGNOSIS — G62.9 NEUROPATHY: ICD-10-CM

## 2020-10-14 DIAGNOSIS — J30.2 SEASONAL ALLERGIC RHINITIS: ICD-10-CM

## 2020-10-14 DIAGNOSIS — E78.2 MIXED HYPERLIPIDEMIA: ICD-10-CM

## 2020-10-14 DIAGNOSIS — C79.51 ADENOCARCINOMA METASTATIC TO RIGHT FEMUR (H): ICD-10-CM

## 2020-10-14 DIAGNOSIS — G89.29 CHRONIC BILATERAL LOW BACK PAIN WITH BILATERAL SCIATICA: ICD-10-CM

## 2020-10-14 DIAGNOSIS — R11.2 NAUSEA WITH VOMITING: ICD-10-CM

## 2020-10-14 LAB
BUN SERPL-MCNC: 20.8 MG/DL (ref 7–21)
CALCIUM SERPL-MCNC: 8.9 MG/DL (ref 8.5–10.1)
CHLORIDE SERPLBLD-SCNC: 103.5 MMOL/L (ref 98–110)
CO2 SERPL-SCNC: 23 MMOL/L (ref 20–32)
CREAT SERPL-MCNC: 1.1 MG/DL (ref 0.7–1.3)
GFR SERPL CREATININE-BSD FRML MDRD: 74.4 ML/MIN/1.7 M2
GLUCOSE SERPL-MCNC: 88.5 MG'DL (ref 70–99)
POTASSIUM SERPL-SCNC: 4.9 MMOL/L (ref 3.2–4.6)
SODIUM SERPL-SCNC: 138.3 MMOL/L (ref 132–142)

## 2020-10-14 PROCEDURE — 99207 PR NO CHARGE LOS: CPT | Performed by: PHARMACIST

## 2020-10-14 PROCEDURE — 99214 OFFICE O/P EST MOD 30 MIN: CPT | Mod: GC | Performed by: STUDENT IN AN ORGANIZED HEALTH CARE EDUCATION/TRAINING PROGRAM

## 2020-10-14 PROCEDURE — 36415 COLL VENOUS BLD VENIPUNCTURE: CPT | Performed by: STUDENT IN AN ORGANIZED HEALTH CARE EDUCATION/TRAINING PROGRAM

## 2020-10-14 PROCEDURE — 80048 BASIC METABOLIC PNL TOTAL CA: CPT | Performed by: STUDENT IN AN ORGANIZED HEALTH CARE EDUCATION/TRAINING PROGRAM

## 2020-10-14 RX ORDER — GABAPENTIN 300 MG/1
300 CAPSULE ORAL 2 TIMES DAILY
COMMUNITY
Start: 2020-10-14 | End: 2022-10-04

## 2020-10-14 RX ORDER — DULOXETIN HYDROCHLORIDE 60 MG/1
60 CAPSULE, DELAYED RELEASE ORAL DAILY
Qty: 90 CAPSULE | Refills: 3 | Status: SHIPPED | OUTPATIENT
Start: 2020-10-14 | End: 2021-11-22

## 2020-10-14 RX ORDER — OXYCODONE HYDROCHLORIDE 5 MG/1
TABLET ORAL
COMMUNITY
Start: 2020-10-14 | End: 2021-03-30

## 2020-10-14 RX ORDER — DRONABINOL 2.5 MG/1
2.5 CAPSULE ORAL
COMMUNITY
Start: 2020-10-14 | End: 2024-03-29

## 2020-10-14 NOTE — PROGRESS NOTES
Hospitalization Follow-up Visit         [facility]       Hospital Follow-up Visit:    Hospital:  [facility]  Date of Admission: 10/8/2020  Date of Discharge: 10/11/2020  Reason(s) for Admission: Hyperkalemia            Problems taking medications regularly:  None       Post Discharge Medication Reconciliation: discharge medications reconciled and changed, per note/orders.       Problems adhering to non-medication therapy:  None       Medications reviewed by: by PharmD    Summary of hospitalization:  CareEverywhere information obtained and reviewed. Admitted to [facility] on 10/8/2020 for hyperkalemia and metabolic acidosis detected on labs obtained at outpatient chemotherapy clinic. While inpatient, potassium was corrected with Lasix IV, IV fluids, Kayexelate, and discontinuation of losartan. Sodium bicarbonate started by Nephrology due to concern for type IV renal tubular acidosis.  Diagnostic Tests/Treatments reviewed.  Follow up needed: BMP  Other Healthcare Providers Involved in Patient s Care: Oncology  Update since discharge: stable.   Plan of care communicated with patient            Review of Systems:   CONSTITUTIONAL: no fatigue, no unexpected change in weight  SKIN: no worrisome rashes, no worrisome moles, no worrisome lesions  EYES: no acute vision problems or changes  ENT: no ear problems, no mouth problems, no throat problems  RESP: no significant cough, no shortness of breath  CV: no chest pain, no palpitations, no new or worsening peripheral edema  GI: no nausea, no vomiting, no constipation, no diarrhea            Physical Exam:     Vitals:    10/14/20 1329   BP: 128/73   BP Location: Right arm   Patient Position: Sitting   Cuff Size: Adult Regular   Pulse: 77   Resp: 17   Temp: 98.2  F (36.8  C)   TempSrc: Oral   SpO2: 99%   Weight: 50.7 kg (111 lb 12.8 oz)     Body mass index is 16.39 kg/m .    GENERAL: Awake, alert. Thin and frail appearing. No acute distress. Appears comfortable.  HEENT:  Head: Normocephalic and atraumatic; no dysmorphic features. Eyes: Eye lids and lashes normal; pupils equal, round and reactive to light;no scleral icterus or conjunctival injection. Oropharynx: mucus membranes pink and moist; tonsils without enlargement, erythema or exudates.  NECK: Supple and symmetric. Trachea midline. No anterior or posterior cervical lymphadenopathy, no supraclavicular lymphadenopathy.   SKIN: Warm and dry.  LUNGS: No increased work of breathing; good air movement throughout all lung fields; breath sounds clear to auscultation bilaterally throughout all lung fields with no crackles or wheezing.  CARDIOVASCULAR: Regular rate and rhythm; normal S1 and S2; no S3 or S4; no murmur, rub or click. Radial and dorsalis pedis/posterior tibial pulses intact; normal capillary refill. No peripheral edema.  ABDOMEN: Non-distended. Soft and non-tender in all quadrants; no masses or hepatosplenomegally.  NEUROLOGIC: Awake, alert. Cranial nerves II-XII are grossly intact. No tremor or shaking.  PSYCH: Normal affect, mood, orientation, memory and insight.         Results:     Results from the last 24 hours   Results for orders placed or performed in visit on 10/14/20 (from the past 24 hour(s))   Basic Metabolic Panel ([place])   Result Value Ref Range    Urea Nitrogen 20.8 7.0 - 21.0 mg/dL    Calcium 8.9 8.5 - 10.1 mg/dL    Chloride 103.5 98.0 - 110.0 mmol/L    Carbon Dioxide 23.0 20.0 - 32.0 mmol/L    Creatinine 1.1 0.7 - 1.3 mg/dL    Glucose 88.5 70.0 - 99.0 mg'dL    Potassium 4.9 (H) 3.2 - 4.6 mmol/L    Sodium 138.3 132.0 - 142.0 mmol/L    GFR Estimate 74.4 >60.0 mL/min/1.7 m2    GFR Estimate If Black >90 >60.0 mL/min/1.7 m2     Assessment and Plan      Peter was seen today for hospital f/u and medication reconciliation.    Diagnoses and all orders for this visit:    Hospital discharge follow-up  Hyperkalemia   Admitted to [facility] on 10/8/2020 for hyperkalemia and metabolic acidosis detected on labs  obtained at outpatient chemotherapy clinic. While inpatient, potassium was corrected with Lasix IV, IV fluids, Kayexelate, and discontinuation of losartan. Sodium bicarbonate started by Nephrology due to concern for type IV renal tubular acidosis. Had recurrent hyperkalemia on 10/13/2020 at outpatient chemotherapy clinic and was directed to [facility] Emergency department. Potassium of 5.6 on 10/13, which improved to 4.9 in the ED with IV fluids and IV Lasix. BMP obtained today revealed serum potassium of 4.9. Will have repeat labs tomorrow at chemotherapy clinic. Hyperkalemia possibly secondary to losartan, tumor lysis syndrome given metastatic adenocarcinoma, renal dysfunction, type IV renal tubular acidosis.   -     Basic Metabolic Panel ([place])      E&M code to be billed if TCM cannot be: 31848    Type of decision making: Moderate complexity (89550)    Options for treatment and follow-up care were reviewed with the patient  Peter Perez   engaged in the decision making process and verbalized understanding of the options discussed and agreed with the final plan.      Patient discussed with attending physician, Dr. Anthony Hunt, who agrees with the assessment and plan.    Matt Mera, PGY-3  [facility]  10/15/2020

## 2020-10-14 NOTE — PROGRESS NOTES
MTM ENCOUNTER  SUBJECTIVE/OBJECTIVE:                           Peter Perez is a 60 year old male seen for a TCM visit.  He was discharged from [facility] on 10/11/2020 for hyperkalemia. Today's visit is a co-visit with Dr. Mera.     Chief Complaint: TCM.    Allergies/ADRs: Reviewed in chart  Tobacco: He reports that he has been smoking cigarettes. He has been smoking about 0.50 packs per day. He has never used smokeless tobacco.    Alcohol: none  Caffeine: Not assessed  Activity: Not assessed  PMH: Reviewed in chart    Medication Adherence/Access: no issues reported. Patient did not know what medications he was taking and says a family member named Bianca takes care of them and would know them better. I called Bianca but I was sent to [facility]. He was able to state that she sets the medications in a pill box and he does not miss doses of the medications. He was also able to confirm some of the medications that he takes as we went through the list.     Hyperkalemia: Patient was discharged on sodium bicarbonate 650mg twice daily. He says he has been taking this medication. His potassium today was 4.9    Pain: Patient is taking duloxetine 30mg twice daily, gabapentin 300mg twice daily, oxycodone 5mg 1-2 tablets 4 times daily as needed, and acetaminophen 325mg 1-2 tablets every 6 hours as needed. Patent said his pain was controlled. Patient has Narcan nasal spray to be used if needed    Asthma: Patient uses albuterol MDI as needed. He states he uses this infrequently, about 3 times per week. He states that it does control his symptoms.     Seasonal Allergies: Patient is taking cetirizine 10mg daily and fluticasone nasal spray 1-2 sprays daily as needed. He states that he rarely uses the fluticasone and that his symptoms are mostly controlled.     Nausea/Vomitting/Appetite: Patient takes dronabinol 2.5mg twice daily with lunch and dinner. He states that his appetite is better but he is not eating as much as he  "would like, since hospitalization. He denies current symptoms of nausea    Hyperlipidemia: Patient was taking atorvastatin 40mg daily, but this was discontinued on 7//20/2020 for unknown reasons. Patient was unaware as to why it was discontinued.      BP Readings from Last 1 Encounters:   10/14/20 128/73     Pulse Readings from Last 1 Encounters:   10/14/20 77     Wt Readings from Last 1 Encounters:   10/14/20 111 lb 12.8 oz (50.7 kg)     Ht Readings from Last 1 Encounters:   02/25/20 5' 9.25\" (1.759 m)     Estimated body mass index is 16.39 kg/m  as calculated from the following:    Height as of 2/25/20: 5' 9.25\" (1.759 m).    Weight as of an earlier encounter on 10/14/20: 111 lb 12.8 oz (50.7 kg).    Temp Readings from Last 1 Encounters:   10/14/20 98.2  F (36.8  C) (Oral)       ASSESSMENT:                          Medication Adherence: Patient is taking medications with the assistance of his family member Bianca. He was able to provide some information about what he was taking, but did not know if he was taking anything else, over the counter or otherwise.     Hyperkalemia: Still elevated. Continue to monitor and continue taking sodium bicarbonate as prescribed.     Pain: Duloxetine could be used once daily as opposed to twice daily. Otherwise controlled    Asthma: Stable. Continue to monitor for changes in symptoms    Seasonal Allergies: Stable. If symptoms start to develop, could start using fluticasone scheduled for symptom prevention and management.    N/V/Appetite: Stable. If appetite does recover from recent hospitalizations, dronabinol dose could potentially be increased.    Hyperlipidemia: Patient would benefit from potentially restarting atorvastatin and from a lipid panel.     PLAN:                          Post Discharge Medication Reconciliation Status: discharge medications reconciled and changed, per note/orders.      Change duloxetine from 30mg twice daily to 60mg once daily    Recommend restarting " atorvastatin 40mg    Medication issues to be addressed at a future visit      Reassess pain control and appetite at a later date     Order lipid panel    Perform further medication reconciliation with Bianca to see if any other medications are being administered    I spent 15 minutes with this patient today. All changes were made via collaborative practice agreement with Dr. Mera. A copy of the visit note was provided to the patient's primary care provider.    Will follow up at next appointment.    The patient was given a summary of these recommendations. See Provider note/AVS from today.     Alejandra MendezD

## 2020-10-14 NOTE — RESULT ENCOUNTER NOTE
I spoke with the patient via telephone call and communicated these results.    Matt Mera MD  October 14, 2020

## 2020-10-15 NOTE — PROGRESS NOTES
I have verified the content of the note, which accurately reflects my assessment of the patient and the plan of care.   Krystal Amato, [facility], PharmD

## 2020-10-16 NOTE — PROGRESS NOTES
Preceptor Attestation:    Patient seen and evaluated in person. I discussed the patient with the resident. I have verified the content of the note, which accurately reflects my assessment of the patient and the plan of care.   Supervising Physician:  Anthony Hunt MD.

## 2020-11-13 NOTE — TELEPHONE ENCOUNTER
"[facility] phone call message- patient requesting a refill:    Full Medication Name:    \"Potassium medication to be refilled\"    Pharmacy confirmed as       [id number] - [place], [place] - [address] [place] [address] AT [facility] [address]  [address] [place] [address]  Phone: [phone] Fax: [phone]  : Yes    Additional Comments:     None    OK to leave a message on voice mail? Yes    Primary language: English      needed? No    Call taken on November 13, 2020 at 2:36 PM by Nanda Berg CMA  "

## 2020-11-18 NOTE — TELEPHONE ENCOUNTER
Tried calling patient regarding message, but he is not available. Left a message for him to call the clinic.

## 2020-11-24 ENCOUNTER — MEDICAL CORRESPONDENCE (OUTPATIENT)
Dept: HEALTH INFORMATION MANAGEMENT | Facility: CLINIC | Age: 60
End: 2020-11-24

## 2020-11-24 ENCOUNTER — DOCUMENTATION ONLY (OUTPATIENT)
Dept: FAMILY MEDICINE | Facility: CLINIC | Age: 60
End: 2020-11-24

## 2020-11-24 NOTE — PROGRESS NOTES
To be completed in Nursing note:    Please reference list for forms that require a visit for completion.  Please remind patients that providers are given 3-5 business days to complete and return forms.      Form type: [facility] / Plan Of Care     Date form received: 11/18/2020    Date form completed by Physician:11/24/2020    How was form returned to patient (mailed, faxed, or at  for patient to ): Faxed to [phone]    Date form mailed/faxed/left at  for patient and sent to HIM for scanning: 10/24/2020      Once form is left for patient, faxed, or mailed PCS will then close the documentation only encounter.

## 2020-12-02 ENCOUNTER — MEDICAL CORRESPONDENCE (OUTPATIENT)
Dept: HEALTH INFORMATION MANAGEMENT | Facility: CLINIC | Age: 60
End: 2020-12-02

## 2020-12-03 ENCOUNTER — DOCUMENTATION ONLY (OUTPATIENT)
Dept: FAMILY MEDICINE | Facility: CLINIC | Age: 60
End: 2020-12-03

## 2020-12-03 NOTE — PROGRESS NOTES
To be completed in Nursing note:    Please reference list for forms that require a visit for completion.  Please remind patients that providers are given 3-5 business days to complete and return forms.      Form type:  Occupational Therapy Discharge Summary    Date form received:   11/19/20    Date form completed by Physician:  12.2.21    How was form returned to patient (mailed, faxed, or at  for patient to ):  Fax back at [phone]    Date form mailed/faxed/left at  for patient and sent to HIM for scanning:  Forms has been faxed.      Once form is left for patient, faxed, or mailed PCS will then close the documentation only encounter.

## 2021-01-27 ENCOUNTER — VIRTUAL VISIT (OUTPATIENT)
Dept: FAMILY MEDICINE | Facility: CLINIC | Age: 61
End: 2021-01-27
Payer: COMMERCIAL

## 2021-01-27 ENCOUNTER — ANCILLARY PROCEDURE (OUTPATIENT)
Dept: GENERAL RADIOLOGY | Facility: CLINIC | Age: 61
End: 2021-01-27
Attending: FAMILY MEDICINE
Payer: COMMERCIAL

## 2021-01-27 ENCOUNTER — OFFICE VISIT (OUTPATIENT)
Dept: FAMILY MEDICINE | Facility: CLINIC | Age: 61
End: 2021-01-27
Payer: COMMERCIAL

## 2021-01-27 ENCOUNTER — TELEPHONE (OUTPATIENT)
Dept: FAMILY MEDICINE | Facility: CLINIC | Age: 61
End: 2021-01-27

## 2021-01-27 VITALS
OXYGEN SATURATION: 100 % | SYSTOLIC BLOOD PRESSURE: 153 MMHG | HEART RATE: 94 BPM | TEMPERATURE: 96.7 F | RESPIRATION RATE: 18 BRPM | DIASTOLIC BLOOD PRESSURE: 91 MMHG | BODY MASS INDEX: 17.89 KG/M2 | WEIGHT: 122 LBS

## 2021-01-27 DIAGNOSIS — M79.604 PAIN OF RIGHT LOWER EXTREMITY: ICD-10-CM

## 2021-01-27 DIAGNOSIS — M79.604 PAIN OF RIGHT LOWER EXTREMITY: Primary | ICD-10-CM

## 2021-01-27 PROCEDURE — 99207 PR NO BILLABLE SERVICE THIS VISIT: CPT | Mod: 95 | Performed by: STUDENT IN AN ORGANIZED HEALTH CARE EDUCATION/TRAINING PROGRAM

## 2021-01-27 PROCEDURE — 73502 X-RAY EXAM HIP UNI 2-3 VIEWS: CPT | Mod: FY | Performed by: RADIOLOGY

## 2021-01-27 PROCEDURE — 99214 OFFICE O/P EST MOD 30 MIN: CPT | Mod: GC | Performed by: STUDENT IN AN ORGANIZED HEALTH CARE EDUCATION/TRAINING PROGRAM

## 2021-01-27 RX ORDER — OXYCODONE HYDROCHLORIDE 10 MG/1
10 TABLET ORAL EVERY 4 HOURS PRN
Qty: 60 TABLET | Refills: 0 | Status: SHIPPED | OUTPATIENT
Start: 2021-01-27 | End: 2021-03-30

## 2021-01-27 ASSESSMENT — PATIENT HEALTH QUESTIONNAIRE - PHQ9: SUM OF ALL RESPONSES TO PHQ QUESTIONS 1-9: 0

## 2021-01-27 NOTE — PROGRESS NOTES
Preceptor Attestation:    Patient seen and evaluated in person. I discussed the patient with the resident. I personally reviewed the imaging and agree with the interpretation documented by the resident. I have verified the content of the note, which accurately reflects my assessment of the patient and the plan of care.   Supervising Physician:  Chadd Quintero MD.

## 2021-01-27 NOTE — TELEPHONE ENCOUNTER
[facility] phone call message- general phone call:    Reason for call:     Pt's daughter is calling to check on forms    Action desired:    Call back    Return call needed: Yes    OK to leave a message on voice mail? Yes    Advised patient to response may take up to 2 business days: Yes    Primary language: English      needed? No    Call taken on January 27, 2021 at 10:16 AM by Nanda Berg CMA

## 2021-01-27 NOTE — TELEPHONE ENCOUNTER
She already spoke with provider about it, and Peter is coming today at 3 pm so we will get it going.

## 2021-01-27 NOTE — PROGRESS NOTES
Peter is a 60 year old who is being evaluated via a billable telephone visit.      What phone number would you like to be contacted at? [phone]  How would you like to obtain your AVS? Mail a copy     Assessment & Plan     Pain of right lower extremity  Presents with worsening right leg pain.  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  Initially diagnosed via CT scan at [facility] on 7/21/2020 which revealed an 8.7 x 7.9 cm lytic mass centered in the right ilium with invasion of the iliopsoas muscle.  Underwent percutaneous biopsy of the right iliac mass on 7/22/2020 which revealed poorly differentiated adenocarcinoma.  Adenocarcinoma thought to be of lung origin.  Current pain medications include oxycodone, gabapentin, diclofenac, duloxetine, and acetaminophen.  Now having worsening pain.  We will have patient present to clinic for in person evaluation later today for leg pain.    Review of external notes as documented above     10 minutes spent on the date of the encounter doing chart review, history and exam, documentation and further activities as noted above    Clinician location:  [facility]    Return to clinic later today for in person evaluation of leg pain.    Patient was discussed with attending physician, Dr. Rosales Salinas MD, who agrees with the assessment and plan.    Matt Mera, PGY-3  [facility]  01/27/21    Subjective     Peter is a 60 year old who presents via telephone visit today for the following health issues: leg pain and FMLA paperwork.    HPI     Patient reports worsening right leg pain.  Has history of metastatic adenocarcinoma to the right iliac wing and invasion into the adjacent iliopsoas muscle.  First diagnosed via CT scan in July 2020 at [facility].  Has been on oxycodone as needed for right leg pain.  Today reports he is having worsening  "right leg pain.  Pain mainly in the hip.  Pain is 8 out of 10 severity.  Describes the pain as \"sharp.\"  Pain is nonradiating.  No numbness or tingling.  Wonders if he is overdoing it with physical therapy.    No fevers or chills.  No redness or swelling of the leg.  Thinks in person evaluation of the leg would be best.    He is also requesting that [facility] paperwork be completed on his behalf.  His daughter is planning to provide care to him given his advanced cancer.    Review of Systems   CONSTITUTIONAL: See above.  MUSCULOSKELETAL: See above.  NEUROLOGIC: See above.        Objective       Vitals:  No vitals were obtained today due to virtual visit.    Physical Exam   alert, no distress and cooperative  PSYCH: Alert; coherent speech, normal   rate and volume, able to articulate logical thoughts, able   to abstract reason, no tangential thoughts, no hallucinations   or delusions  His affect is normal  RESP: No cough, no audible wheezing, able to talk in full sentences  Remainder of exam unable to be completed due to telephone visits    Phone call start time: 9:38 AM  Phone call end time: 9:47 PM  Phone call duration: 9 minutes  "

## 2021-01-28 ASSESSMENT — ASTHMA QUESTIONNAIRES: ACT_TOTALSCORE: 21

## 2021-01-28 NOTE — PROGRESS NOTES
Assessment & Plan     Pain of right lower extremity  Presents with worsening right leg pain. 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 Initially diagnosed via CT scan at [facility] on 7/21/2020 which revealed an 8.7 x 7.9 cm lytic mass centered in the right ilium with invasion of the iliopsoas muscle. Underwent percutaneous biopsy of the right iliac mass on 7/22/2020 which revealed poorly differentiated adenocarcinoma. Adenocarcinoma thought to be of lung origin. Current pain medications include oxycodone, gabapentin, diclofenac, duloxetine, and acetaminophen. Now having worsening pain. Significant tenderness to palpation of the right proximal femur and right iliac crest. Suspect pain related to bony metastases. X-ray obtained in clinic today; no acute fracture or dislocation based on my read. Does appear to have osteoarthritis of the hips bilaterally. Will increase frequency of oxycodone dosing to better manage metastatic bone pain. Narcan has been previously prescribed.  - XR Hip Right 2-3 Views  - oxyCODONE IR (ROXICODONE) 10 MG tablet  Dispense: 60 tablet; Refill: 0      Review of external notes as documented above     25 minutes spent on the date of the encounter doing chart review, history and exam, documentation and further activities as noted above    Clinician location:  [facility]    Return to clinic in 1-2 weeks for follow-up of right leg pain or sooner if develops new or worsening symptoms.    Patient was discussed with attending physician, Dr. Chadd Quintero MD, who agrees with the assessment and plan.    Matt Mera, PGY-3  [facility]  01/27/21    SUBJECTIVE:  Peter Perez is a 60 year old male old who presents to clinic today with chief complaint of right leg pain.    Has history of metastatic adenocarcinoma thought to be of lung origin. CT scan in July 2020  "revealed a 8.7 x 7.9 cm lytic mass centered in the right ilium with invasion of the iliopsoas muscle.  Underwent percutaneous biopsy of the right iliac mass on 7/22/2020 which revealed poorly differentiated adenocarcinoma.    Has been using oxycodone, gabapentin, duloxetine, diclofenac, and Tylenol for pain control. Pain worse over the past several days. Pain is 8 out of 10 severity. Pain is \"sharp and stabbing.\" Pain located at the right upper leg and hip. Pain is not radiating. Wondering if he over did it at physical therapy.    No fever or chills. No numbness, tingling, or tremors. No redness or warmth of the right hip or leg. No injuries, falls, or trauma.    Review of Systems:  CONSTITUTIONAL: See above.  SKIN: See above.  MUSCULOSKELETAL: See above.  NEUROLOGIC: See above.      OBJECTIVE:  BP (!) 153/91 (BP Location: Right arm, Patient Position: Sitting, Cuff Size: Adult Regular)   Pulse 94   Temp 96.7  F (35.9  C) (Tympanic)   Resp 18   Wt 55.3 kg (122 lb)   SpO2 100%   BMI 17.89 kg/m      Physical Exam:   GENERAL: Awake, alert. Appears thin and frail. No acute distress.   BACK: Symmetric, no curvature. Cervical, thoracic and lumbar spinous processes are non-tender to palpation; paraspinous muscles are non-tender to palpation. No costal vertebral tenderness.  MUSCULOSKELETAL: The right proximal femur and iliac crest are tender to palpation. Internal and external rotation of the right hip significantly limited by pain.  NEUROLOGIC: Awake, alert.  Sensation to light touch is intact throughout the upper and lower extremities.     Xray - Reviewed and interpreted by me.  No acute fracture or dislocation of the pelvic bones or femur. Osteoarthritis of the hips bilaterally.            "

## 2021-01-28 NOTE — PROGRESS NOTES
Preceptor Attestation:   I talked to the patient on the phone. I discussed the patient with the resident. I have verified the content of the note, which accurately reflects my assessment of the patient and the plan of care.   Supervising Physician:  Rosales Salinas MD.

## 2021-02-16 ENCOUNTER — DOCUMENTATION ONLY (OUTPATIENT)
Dept: FAMILY MEDICINE | Facility: CLINIC | Age: 61
End: 2021-02-16

## 2021-02-16 NOTE — PROGRESS NOTES
To be completed in Nursing note:    Please reference list for forms that require a visit for completion.  Please remind patients that providers are given 3-5 business days to complete and return forms.      Form type:  LMS Certification/Documentation    Date form received:  1/29/21    Date form completed by Physician:  2/16/21    How was form returned to patient (mailed, faxed, or at  for patient to ):   Fax back at [phone]    Date form mailed/faxed/left at  for patient and sent to HIM for scanning: Has been faxed today      Once form is left for patient, faxed, or mailed PCS will then close the documentation only encounter.

## 2021-03-30 ENCOUNTER — OFFICE VISIT (OUTPATIENT)
Dept: FAMILY MEDICINE | Facility: CLINIC | Age: 61
End: 2021-03-30
Payer: COMMERCIAL

## 2021-03-30 VITALS
OXYGEN SATURATION: 99 % | TEMPERATURE: 98.1 F | SYSTOLIC BLOOD PRESSURE: 169 MMHG | RESPIRATION RATE: 18 BRPM | DIASTOLIC BLOOD PRESSURE: 91 MMHG | WEIGHT: 127 LBS | HEART RATE: 86 BPM | BODY MASS INDEX: 18.62 KG/M2

## 2021-03-30 DIAGNOSIS — I10 ESSENTIAL HYPERTENSION: Primary | ICD-10-CM

## 2021-03-30 DIAGNOSIS — M79.604 PAIN OF RIGHT LOWER EXTREMITY: ICD-10-CM

## 2021-03-30 PROCEDURE — 99214 OFFICE O/P EST MOD 30 MIN: CPT | Mod: GC | Performed by: STUDENT IN AN ORGANIZED HEALTH CARE EDUCATION/TRAINING PROGRAM

## 2021-03-30 RX ORDER — CYCLOBENZAPRINE HCL 10 MG
10 TABLET ORAL 3 TIMES DAILY PRN
Qty: 60 TABLET | Refills: 1 | Status: SHIPPED | OUTPATIENT
Start: 2021-03-30 | End: 2021-06-25

## 2021-03-30 RX ORDER — OXYCODONE HYDROCHLORIDE 10 MG/1
10 TABLET ORAL EVERY 4 HOURS PRN
Qty: 60 TABLET | Refills: 0 | Status: SHIPPED | OUTPATIENT
Start: 2021-03-30 | End: 2023-12-05

## 2021-03-30 RX ORDER — AMLODIPINE BESYLATE 5 MG/1
5 TABLET ORAL DAILY
Qty: 90 TABLET | Refills: 1 | Status: SHIPPED | OUTPATIENT
Start: 2021-03-30 | End: 2022-02-15 | Stop reason: DRUGHIGH

## 2021-03-30 NOTE — PROGRESS NOTES
Assessment & Plan     Essential hypertension  Blood pressure of 183/92 mmHg at physical therapy earlier today. Blood pressure elevated in clinic to 169/91 mmHg. Has had elevated blood pressures in clinic previously; elevated blood pressures thought possibly 2/2 pain from metastatic cancer. Given significant elevation today, will start antihypertensive therapy with amlodipine 5 mg daily. Will have patient return to clinic in 2 weeks for blood pressure check at which time could consider titrating amlodipine dose.  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 Initially diagnosed via CT scan at [facility] on 7/21/2020 which revealed an 8.7 x 7.9 cm lytic mass centered in the right ilium with invasion of the iliopsoas muscle. Underwent percutaneous biopsy of the right iliac mass on 7/22/2020 which revealed poorly differentiated adenocarcinoma. Adenocarcinoma thought to be of lung origin. Follows with Oncology and is undergoing chemotherapy. Will provide refill for oxycodone and Flexeril to help manage metastatic cancer related pain.  - oxyCODONE IR (ROXICODONE) 10 MG tablet  Dispense: 60 tablet; Refill: 0  - cyclobenzaprine (FLEXERIL) 10 MG tablet  Dispense: 60 tablet; Refill: 1      Review of external notes as documented elsewhere in note  Prescription drug management  25 minutes spent on the date of the encounter doing chart review, history and exam, documentation and further activities per the note      Clinician location:  [facility]    Return to clinic in 2 weeks for follow-up blood pressure or sooner if develops new or worsening symptoms.    Patient was discussed with attending physician, Dr. Prateek Bolton MD, who agrees with the assessment and plan.    Matt Mera, PGY-3  [facility] Medicine  Residency  03/30/21      SUBJECTIVE:  Peter Perez is a 60 year old male old who presents to clinic today with chief complaint of elevated blood pressure.    Was at physical therapy earlier today. Blood pressure was elevated at PT to 183/92 mmHg. Physical therapist opted to hold aerobic therapies due to elevated blood pressure. No chest pain, shortness of breath, lightheadedness, dizziness, vision changes, or swelling. No numbness or motor weakness.     Continues to have right hip pain. Has known  metastatic adenocarcinoma to the right iliac wing with invasion into the adjacent iliopsoas muscle. Needs refill of muscle relaxant and oxycodone, which seem to do good job managing the pain.    Lastly, would like COVID-19 vaccine. Unsure if he qualifies given that he has active cancer and is receiving chemotherapy. No known allergies to vaccines.    Review of Systems:  CONSTITUTIONAL: No fevers or chills.  HEENT: See above.  RESPIRATORY: See above.  CARDIOVASCULAR: See above. No palpitations or irregular heart beats. No syncope. No lower extremity swelling.  MUSCULOSKELETAL: See above.  NEUROLOGIC: See above.      OBJECTIVE:  BP (!) 169/91 (BP Location: Right arm, Patient Position: Sitting, Cuff Size: Adult Regular)   Pulse 86   Temp 98.1  F (36.7  C) (Tympanic)   Resp 18   Wt 57.6 kg (127 lb)   SpO2 99%   BMI 18.62 kg/m      Physical Exam:   GENERAL: Awake, alert. Appears thin and frail. No acute distress.   HEENT: Head: Normocephalic and atraumatic; no dysmorphic features. Eyes: Eye lids and lashes normal; pupils equal, round and reactive to light; no scleral icterus or conjunctival injection.   LUNGS: No increased work of breathing; good air movement throughout all lung fields; breath sounds clear to auscultation bilaterally throughout all lung fields with no crackles or wheezing.  CARDIOVASCULAR: Regular rate and rhythm; normal S1 and S2; no S3 or S4; no murmur, rub or click. Radial and dorsalis  pedis/posterior tibial pulses intact; normal capillary refill. No peripheral edema.  MUSCULOSKELETAL: The right proximal femur and iliac crest are tender to palpation.  NEUROLOGIC: Awake, alert.  Sensation to light touch is intact throughout the upper and lower extremities.   PSYCH: Normal affect, mood, orientation, memory and insight.

## 2021-03-30 NOTE — PROGRESS NOTES
Preceptor Attestation:    I discussed the patient with the resident and evaluated the patient in person. I have verified the content of the note, which accurately reflects my assessment of the patient and the plan of care.   Supervising Physician:  Prateek Bolton MD.

## 2021-04-03 ENCOUNTER — IMMUNIZATION (OUTPATIENT)
Dept: FAMILY MEDICINE | Facility: CLINIC | Age: 61
End: 2021-04-03
Payer: COMMERCIAL

## 2021-04-03 PROCEDURE — 0031A PR COVID VAC JANSSEN AD26 0.5ML: CPT

## 2021-04-03 PROCEDURE — 91303 PR COVID VAC JANSSEN AD26 0.5ML: CPT

## 2021-04-14 ENCOUNTER — OFFICE VISIT (OUTPATIENT)
Dept: FAMILY MEDICINE | Facility: CLINIC | Age: 61
End: 2021-04-14
Payer: COMMERCIAL

## 2021-04-14 VITALS
SYSTOLIC BLOOD PRESSURE: 135 MMHG | WEIGHT: 128 LBS | OXYGEN SATURATION: 98 % | BODY MASS INDEX: 18.77 KG/M2 | DIASTOLIC BLOOD PRESSURE: 87 MMHG | HEART RATE: 95 BPM | TEMPERATURE: 97.3 F | RESPIRATION RATE: 16 BRPM

## 2021-04-14 DIAGNOSIS — J45.40 MODERATE PERSISTENT ASTHMA, UNSPECIFIED WHETHER COMPLICATED: ICD-10-CM

## 2021-04-14 DIAGNOSIS — E55.9 VITAMIN D DEFICIENCY: ICD-10-CM

## 2021-04-14 DIAGNOSIS — I10 ESSENTIAL HYPERTENSION: Primary | ICD-10-CM

## 2021-04-14 PROCEDURE — 99214 OFFICE O/P EST MOD 30 MIN: CPT | Mod: GC | Performed by: STUDENT IN AN ORGANIZED HEALTH CARE EDUCATION/TRAINING PROGRAM

## 2021-04-14 RX ORDER — CHOLECALCIFEROL (VITAMIN D3) 50 MCG
1 TABLET ORAL DAILY
Qty: 90 TABLET | Refills: 0 | Status: SHIPPED | OUTPATIENT
Start: 2021-04-14 | End: 2024-03-29

## 2021-04-14 RX ORDER — ALBUTEROL SULFATE 90 UG/1
2 AEROSOL, METERED RESPIRATORY (INHALATION) EVERY 6 HOURS PRN
Qty: 8.5 G | Refills: 3 | Status: SHIPPED | OUTPATIENT
Start: 2021-04-14 | End: 2023-05-16

## 2021-04-14 NOTE — PROGRESS NOTES
Assessment & Plan     Essential hypertension  Started on amlodipine 5 mg daily on 3/30/21 due to persistently elevated blood pressure readings in clinic. Blood pressure well-controlled today at 135/87 mmHg on amlodipine 5 mg daily. No adverse side effects. Will continue with amlodipine at current dose for now.    Vitamin D deficiency  Vitamin D level low at 12 on 2/25/2021 lab test at Nephrology clinic. Will start vitamin D supplement 2000 units daily. Plan to recheck vitamin D level in 3 months.  - vitamin D3 (CHOLECALCIFEROL) 50 mcg (2000 units) tablet  Dispense: 90 tablet; Refill: 0    Moderate persistent asthma, unspecified whether complicated  Refill for albuterol inhaler provided. No current asthma symptoms. Lungs clear to auscultation bilaterally in clinic today.  - albuterol (PROAIR HFA/PROVENTIL HFA/VENTOLIN HFA) 108 (90 Base) MCG/ACT inhaler  Dispense: 8.5 g; Refill: 3      Prescription drug management  25 minutes spent on the date of the encounter doing chart review, history and exam, documentation and further activities per the note     Clinician location:  [facility]    Return to clinic in 3 months for follow-up vitamin D deficiency or sooner if develops new or worsening symptoms.    Patient was discussed with attending physician, Dr. Chadd Quintero MD, who agrees with the assessment and plan.    Matt Mera, PGY-3  [facility]  04/14/21      SUBJECTIVE:  Peter Perez is a 60 year old male old who presents to clinic today with chief complaint of blood pressure check.    Started on amlodipine 5 mg daily on 3/30/21 due to persistently elevated blood pressure readings in clinic. Tolerating amlodipine without side effects. No lightheadedness or dizziness. No swelling in legs, ankles, or feet. No headache or vision changes. No chest pain or shortness of breath.    Interested in starting vitamin D. Had vitamin D level of 12 on 2/25/21. Partner is  encouraging that the patient start taking vitamin D.    Would like refill of albuterol inhaler. Has history of asthma. Concerned about having shortness of breath and wheezing during chemotherapy infusions. No current cough, wheezing, shortness of breath, or chest tightness.     Review of Systems:  CONSTITUTIONAL: See above.  RESPIRATORY: See above.  MUSCULOSKELETAL: Positive for right hip and thigh pain.      OBJECTIVE:  /87 (BP Location: Right arm, Patient Position: Sitting, Cuff Size: Adult Regular)   Pulse 95   Temp 97.3  F (36.3  C) (Tympanic)   Resp 16   Wt 58.1 kg (128 lb)   SpO2 98%   BMI 18.77 kg/m      Physical Exam:   GENERAL: Awake, alert. Well-nourished, well-developed. No acute distress. Appears comfortable.  LUNGS: No increased work of breathing; good air movement throughout all lung fields; breath sounds clear to auscultation bilaterally throughout all lung fields with no crackles or wheezing.  CARDIOVASCULAR: Regular rate and rhythm; normal S1 and S2; no S3 or S4; no murmur, rub or click. No peripheral edema.  PSYCH: Normal affect, mood, orientation, memory and insight.

## 2021-04-14 NOTE — PROGRESS NOTES
Preceptor Attestation:    I discussed the patient with the resident and evaluated the patient in person. I have verified the content of the note, which accurately reflects my assessment of the patient and the plan of care.   Supervising Physician:  Chadd Quintero MD.

## 2021-04-15 ASSESSMENT — ASTHMA QUESTIONNAIRES: ACT_TOTALSCORE: 15

## 2021-05-28 ENCOUNTER — RECORDS - HEALTHEAST (OUTPATIENT)
Dept: ADMINISTRATIVE | Facility: CLINIC | Age: 61
End: 2021-05-28

## 2021-05-30 ENCOUNTER — RECORDS - HEALTHEAST (OUTPATIENT)
Dept: ADMINISTRATIVE | Facility: CLINIC | Age: 61
End: 2021-05-30

## 2021-06-03 ENCOUNTER — RECORDS - HEALTHEAST (OUTPATIENT)
Dept: ADMINISTRATIVE | Facility: CLINIC | Age: 61
End: 2021-06-03

## 2021-06-04 ENCOUNTER — RECORDS - HEALTHEAST (OUTPATIENT)
Dept: ADMINISTRATIVE | Facility: CLINIC | Age: 61
End: 2021-06-04

## 2021-06-13 DIAGNOSIS — I10 ESSENTIAL HYPERTENSION: ICD-10-CM

## 2021-06-14 RX ORDER — LOSARTAN POTASSIUM 50 MG/1
TABLET ORAL
Qty: 90 TABLET | Refills: 0 | Status: SHIPPED | OUTPATIENT
Start: 2021-06-14 | End: 2022-02-15

## 2021-07-13 DIAGNOSIS — Z53.9 DIAGNOSIS NOT YET DEFINED: Primary | ICD-10-CM

## 2021-07-15 ENCOUNTER — DOCUMENTATION ONLY (OUTPATIENT)
Dept: FAMILY MEDICINE | Facility: CLINIC | Age: 61
End: 2021-07-15

## 2021-07-15 NOTE — PROGRESS NOTES
To be completed in Nursing note:    Please reference list for forms that require a visit for completion.  Please remind patients that providers are given 3-5 business days to complete and return forms.      Form type: [facility] HOME HEALTH CERT AND PLAN OF CARE     Date form received: 24    Date form completed by Physician: 21    How was form returned to patient (mailed, faxed, or at  for patient to ): FAXED [phone]    Date form mailed/faxed/left at  for patient and sent to HIM for scannin/15/21    Once form is left for patient, faxed, or mailed PCS will then close the documentation only encounter.     PRASANTH Gibbs  10:14 AM  7/15/2021

## 2021-07-30 ENCOUNTER — OFFICE VISIT (OUTPATIENT)
Dept: FAMILY MEDICINE | Facility: CLINIC | Age: 61
End: 2021-07-30
Payer: COMMERCIAL

## 2021-07-30 VITALS
RESPIRATION RATE: 16 BRPM | HEART RATE: 74 BPM | HEIGHT: 70 IN | SYSTOLIC BLOOD PRESSURE: 134 MMHG | DIASTOLIC BLOOD PRESSURE: 82 MMHG | OXYGEN SATURATION: 98 % | TEMPERATURE: 98.4 F | WEIGHT: 130.4 LBS | BODY MASS INDEX: 18.67 KG/M2

## 2021-07-30 DIAGNOSIS — Z02.89 ENCOUNTER FOR COMPLETION OF FORM WITH PATIENT: Primary | ICD-10-CM

## 2021-07-30 PROCEDURE — 99212 OFFICE O/P EST SF 10 MIN: CPT | Mod: GC | Performed by: STUDENT IN AN ORGANIZED HEALTH CARE EDUCATION/TRAINING PROGRAM

## 2021-07-30 ASSESSMENT — MIFFLIN-ST. JEOR: SCORE: 1402.74
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
## 2021-07-30 NOTE — PROGRESS NOTES
Preceptor Attestation:   Patient seen, evaluated and discussed with the resident. I have verified the content of the note, which accurately reflects my assessment of the patient and the plan of care.   Supervising Physician:  Marielos Riggins MD

## 2021-08-04 NOTE — PATIENT INSTRUCTIONS
1. Please return FMLA form after daughter has signed it.  2. Please follow up for preventive care visit.    Best regard,    Cricket Munoz MD

## 2021-08-06 ENCOUNTER — DOCUMENTATION ONLY (OUTPATIENT)
Dept: FAMILY MEDICINE | Facility: CLINIC | Age: 61
End: 2021-08-06

## 2021-08-06 NOTE — PROGRESS NOTES
To be completed in Nursing note:    Please reference list for forms that require a visit for completion.  Please remind patients that providers are given 3-5 business days to complete and return forms.      Form type: [employer]    Date form received: 08/05/2021    Date form completed by Physician: 08/06/2021    How was form returned to patient (mailed, faxed, or at  for patient to ): Faxed to [phone]    Date form mailed/faxed/left at  for patient and sent to HIM for scanning:      Once form is left for patient, faxed, or mailed PCS will then close the documentation only encounter.

## 2021-10-16 DIAGNOSIS — Z53.9 DIAGNOSIS NOT YET DEFINED: Primary | ICD-10-CM

## 2021-10-26 ENCOUNTER — DOCUMENTATION ONLY (OUTPATIENT)
Dept: FAMILY MEDICINE | Facility: CLINIC | Age: 61
End: 2021-10-26

## 2021-10-26 NOTE — PROGRESS NOTES
To be completed in Nursing note:    Please reference list for forms that require a visit for completion.  Please remind patients that providers are given 3-5 business days to complete and return forms.      Form type: [employer] [facility] [employer] AND PLAN OF CARE    Date form received:  10/16/21    Date form completed by Physician: 10/26/21    How was form returned to patient (mailed, faxed, or at  for patient to ): faxed to [phone]    Date form mailed/faxed/left at  for patient and sent to HIM for scanning: 10/26/21      Once form is left for patient, faxed, or mailed PCS will then close the documentation only encounter.       Wilbert Anguiano, EMT  10:46 AM  10/26/2021

## 2021-11-20 DIAGNOSIS — M54.41 CHRONIC BILATERAL LOW BACK PAIN WITH BILATERAL SCIATICA: ICD-10-CM

## 2021-11-20 DIAGNOSIS — G89.29 CHRONIC BILATERAL LOW BACK PAIN WITH BILATERAL SCIATICA: ICD-10-CM

## 2021-11-20 DIAGNOSIS — M54.42 CHRONIC BILATERAL LOW BACK PAIN WITH BILATERAL SCIATICA: ICD-10-CM

## 2021-11-22 ENCOUNTER — IMMUNIZATION (OUTPATIENT)
Dept: FAMILY MEDICINE | Facility: CLINIC | Age: 61
End: 2021-11-22
Payer: COMMERCIAL

## 2021-11-22 PROCEDURE — 91306 COVID-19,PF,MODERNA (18+ YRS BOOSTER .25ML): CPT

## 2021-11-22 PROCEDURE — 0064A COVID-19,PF,MODERNA (18+ YRS BOOSTER .25ML): CPT

## 2021-11-22 RX ORDER — DULOXETIN HYDROCHLORIDE 60 MG/1
CAPSULE, DELAYED RELEASE ORAL
Qty: 30 CAPSULE | Refills: 0 | Status: SHIPPED | OUTPATIENT
Start: 2021-11-22 | End: 2021-12-17

## 2021-11-22 NOTE — TELEPHONE ENCOUNTER
UNABLE TO REACH PATIENT FROM ANY OF THE GIVEN CONTACT NUMBERS.    Wilbert Anguiano, EMT  2:50 PM  11/22/2021

## 2021-12-16 DIAGNOSIS — M54.41 CHRONIC BILATERAL LOW BACK PAIN WITH BILATERAL SCIATICA: ICD-10-CM

## 2021-12-16 DIAGNOSIS — M54.42 CHRONIC BILATERAL LOW BACK PAIN WITH BILATERAL SCIATICA: ICD-10-CM

## 2021-12-16 DIAGNOSIS — G89.29 CHRONIC BILATERAL LOW BACK PAIN WITH BILATERAL SCIATICA: ICD-10-CM

## 2021-12-17 RX ORDER — SENNOSIDES 8.6 MG
TABLET ORAL
COMMUNITY
Start: 2021-10-22 | End: 2024-03-29

## 2021-12-17 RX ORDER — GABAPENTIN 100 MG/1
100 CAPSULE ORAL 2 TIMES DAILY
COMMUNITY
Start: 2021-06-22 | End: 2022-10-04

## 2021-12-17 RX ORDER — FLUDROCORTISONE ACETATE 0.1 MG/1
TABLET ORAL
COMMUNITY
Start: 2021-11-26 | End: 2024-03-29

## 2021-12-17 RX ORDER — DULOXETIN HYDROCHLORIDE 60 MG/1
CAPSULE, DELAYED RELEASE ORAL
Qty: 90 CAPSULE | Refills: 0 | Status: SHIPPED | OUTPATIENT
Start: 2021-12-17 | End: 2022-04-15

## 2021-12-17 RX ORDER — CARVEDILOL 3.12 MG/1
TABLET ORAL
COMMUNITY
Start: 2021-12-04 | End: 2022-02-15

## 2021-12-21 ENCOUNTER — DOCUMENTATION ONLY (OUTPATIENT)
Dept: FAMILY MEDICINE | Facility: CLINIC | Age: 61
End: 2021-12-21
Payer: COMMERCIAL

## 2021-12-21 NOTE — PROGRESS NOTES
To be completed in Nursing note:    Please reference list for forms that require a visit for completion.  Please remind patients that providers are given 3-5 business days to complete and return forms.      Form type: [employer] home health cert and plan of care    Date form received: 21    Date form completed by Physician:21    How was form returned to patient (mailed, faxed, or at  for patient to ):  Faxed to [phone]    Date form mailed/faxed/left at  for patient and sent to HIM for scannin21      Once form is left for patient, faxed, or mailed PCS will then close the documentation only encounter.     PRASANTH Gibbs  11:45 AM  2021

## 2022-01-25 ENCOUNTER — DOCUMENTATION ONLY (OUTPATIENT)
Dept: FAMILY MEDICINE | Facility: CLINIC | Age: 62
End: 2022-01-25
Payer: COMMERCIAL

## 2022-01-25 NOTE — PROGRESS NOTES
PER DR. HERNANDEZ, PATIENT NEEDS APPOINTMENT FOR THIS FORM    UNABLE TO REACH PATIENT ON ALL CONTACT NUMBERS    UNABLE TO COMPLETE FORM  1/25/21    To be completed in Nursing note:    Please reference list for forms that require a visit for completion.  Please remind patients that providers are given 3-5 business days to complete and return forms.      Form type: [employer] KR[employer] FORM    Date form received: 1/25/22    Date form completed by Physician:    How was form returned to patient (mailed, faxed, or at  for patient to ): FAXED [phone]    Date form mailed/faxed/left at  for patient and sent to HIM for scanning:      Once form is left for patient, faxed, or mailed PCS will then close the documentation only encounter.       Wilbert Anguiano EMT  2:22 PM  1/25/2022

## 2022-01-31 ENCOUNTER — TRANSFERRED RECORDS (OUTPATIENT)
Dept: HEALTH INFORMATION MANAGEMENT | Facility: CLINIC | Age: 62
End: 2022-01-31
Payer: COMMERCIAL

## 2022-02-15 ENCOUNTER — OFFICE VISIT (OUTPATIENT)
Dept: FAMILY MEDICINE | Facility: CLINIC | Age: 62
End: 2022-02-15
Payer: COMMERCIAL

## 2022-02-15 VITALS
SYSTOLIC BLOOD PRESSURE: 165 MMHG | RESPIRATION RATE: 18 BRPM | HEART RATE: 84 BPM | WEIGHT: 132 LBS | TEMPERATURE: 98.3 F | OXYGEN SATURATION: 95 % | DIASTOLIC BLOOD PRESSURE: 85 MMHG | BODY MASS INDEX: 18.94 KG/M2

## 2022-02-15 DIAGNOSIS — Z02.89 ENCOUNTER FOR COMPLETION OF FORM WITH PATIENT: Primary | ICD-10-CM

## 2022-02-15 DIAGNOSIS — I10 ESSENTIAL HYPERTENSION: ICD-10-CM

## 2022-02-15 PROCEDURE — 99213 OFFICE O/P EST LOW 20 MIN: CPT | Mod: GC | Performed by: STUDENT IN AN ORGANIZED HEALTH CARE EDUCATION/TRAINING PROGRAM

## 2022-02-15 RX ORDER — AMLODIPINE BESYLATE 10 MG/1
10 TABLET ORAL DAILY
Qty: 90 TABLET | Refills: 1 | Status: SHIPPED | OUTPATIENT
Start: 2022-02-15 | End: 2022-10-04

## 2022-02-15 RX ORDER — AMLODIPINE BESYLATE 10 MG/1
10 TABLET ORAL
COMMUNITY
Start: 2021-07-01 | End: 2022-02-15

## 2022-02-15 RX ORDER — CARVEDILOL 3.12 MG/1
3.12 TABLET ORAL 2 TIMES DAILY WITH MEALS
Qty: 60 TABLET | Refills: 3 | Status: SHIPPED | OUTPATIENT
Start: 2022-02-15 | End: 2022-10-04

## 2022-02-15 RX ORDER — LOSARTAN POTASSIUM 50 MG/1
50 TABLET ORAL DAILY
Qty: 90 TABLET | Refills: 0 | Status: SHIPPED | OUTPATIENT
Start: 2022-02-15 | End: 2022-10-04

## 2022-02-15 ASSESSMENT — ASTHMA QUESTIONNAIRES: ACT_TOTALSCORE: 14

## 2022-02-15 NOTE — PROGRESS NOTES
PT SEEN IN CLINIC 2/15/21.    FORMS SENT TO SCANNING.    Wilbert Anguiano, EMT  10:57 AM  2/15/2022

## 2022-02-15 NOTE — PROGRESS NOTES
Preceptor Attestation:    I discussed the patient with the resident and evaluated the patient in person. I have verified the content of the note, which accurately reflects my assessment of the patient and the plan of care.   Supervising Physician:  Asael Slater MD.

## 2022-02-15 NOTE — PROGRESS NOTES
Assessment & Plan     Encounter for completion of form with patient  FMLA forms completed with copy made to be scanned into the chart and faxed to patient's daughters employer.    Essential hypertension  Patient hypertensive today with 3 hypertensive prescribed however it seems as though he may have lapse in taking them when looking through pharmacy fill records.  Will reorder previous doses of medication however I would like patient to follow-up in the near future to ensure he is not being overmedicated.  - losartan (COZAAR) 50 MG tablet; Take 1 tablet (50 mg) by mouth daily  - carvedilol (COREG) 3.125 MG tablet; Take 1 tablet (3.125 mg) by mouth 2 times daily (with meals)  - amLODIPine (NORVASC) 10 MG tablet; Take 1 tablet (10 mg) by mouth daily    Reed Taylor MD  [facility]    Nelson Green is a 61 year old who presents for the following health issues  accompanied by his daughter.    HPI     Patient is here today with his daughter to complete FMLA forms for her employer.  She states that she frequently has missing work up to 3 days weekly in order to care for her father and bring him to doctor's appointments.  This is due to adenocarcinoma metastatic of unknown origin.  Patient is having immunotherapy every 6 weeks and is often ill and has difficulty taking care of himself at home.    Patient's daughter also states that he has medication refills but does not know the names of which need refilling.    Review of Systems   Constitutional, HEENT, cardiovascular, pulmonary, gi and gu systems are negative, except as otherwise noted.      Objective    BP (!) 165/85 (BP Location: Right arm, Patient Position: Sitting, Cuff Size: Adult Regular)   Pulse 84   Temp 98.3  F (36.8  C) (Oral)   Resp 18   Wt 59.9 kg (132 lb)   SpO2 95%   BMI 18.94 kg/m    Body mass index is 18.94 kg/m .  Physical Exam   GENERAL: healthy, alert and no distress  NECK: no adenopathy, no asymmetry,  masses, or scars and thyroid normal to palpation  RESP: lungs clear to auscultation - no rales, rhonchi or wheezes  CV: regular rate and rhythm, normal S1 S2, no S3 or S4, no murmur, click or rub, no peripheral edema and peripheral pulses strong  ABDOMEN: soft, nontender, no hepatosplenomegaly, no masses and bowel sounds normal  MS: no gross musculoskeletal defects noted, no edema

## 2022-02-16 ENCOUNTER — DOCUMENTATION ONLY (OUTPATIENT)
Dept: FAMILY MEDICINE | Facility: CLINIC | Age: 62
End: 2022-02-16
Payer: COMMERCIAL

## 2022-02-16 NOTE — PROGRESS NOTES
To be completed in Nursing note:    Please reference list for forms that require a visit for completion.  Please remind patients that providers are given 3-5 business days to complete and return forms.      Form type: [employer]     Date form received: 22    Date form completed by Physician:  22    How was form returned to patient (mailed, faxed, or at  for patient to ):  FAXED [phone]    Date form mailed/faxed/left at  for patient and sent to HIM for scannin22    Once form is left for patient, faxed, or mailed PCS will then close the documentation only encounter.     PRASANTH Gibbs  12:39 PM  2022

## 2022-02-24 ENCOUNTER — TELEPHONE (OUTPATIENT)
Dept: FAMILY MEDICINE | Facility: CLINIC | Age: 62
End: 2022-02-24
Payer: COMMERCIAL

## 2022-02-24 NOTE — Clinical Note
Could you please Fax this letter to [phone], ATTN: Natali Conte Re: Jackelyn Sarah [facility] clarification

## 2022-02-24 NOTE — LETTER
[facility]  [address] [place] [address]  [phone]      February 25, 2022    RE:  Jackelyn Sarah                                                                                                                                                        [address]  [place] [address]            To whom it may concern:    Jackelyn Sarah's father Peter Perez is a patient at our clinic and receives primary care services through the physicians at our clinic.  I am one of the physicians caring for this patient.    Mr. Perez has metastatic adenocarcinoma (cancer), with tumors and multiple areas around his body.  The initial site of the cancerous tumor is not known and patient is treated with palliative chemotherapy/immunotherapy in order to slow the progression of his disease.  Unfortunately his cancer is not thought to be curable and will require treatment for the remainder of his life.  He also has multiple other comorbid conditions including hypertension, chronic kidney disease, hyperlipidemia, osteoarthritis.    His daughter Jackelyn has requested [facility] approved leave in order to help bring her father to his medical appointments, and care for him during periods of physical decompensation in the home.  It is not unreasonable to expect she will be needed to bring him to medical appointments between oncology, primary care, and other specialty services once weekly.  Further she has notified the letter writer that her father has periods of physical decompensation up to 3 times weekly. Jackelyn is asking for leave as frequently as 3 days weekly for the foreseeable future as her father's condition is progressive in nature for the duration of his lifetime.  I would argue that she will need 1 to 3 days weekly for the foreseeable future and would recommend reevaluating in 6 months.        Sincerely,        Cricket Munoz MD    [employer]

## 2022-02-24 NOTE — TELEPHONE ENCOUNTER
[facility] phone call message- general phone call:    Reason for call: The doctor had filled out paperwork for [employer] for this patient.  [employer] faxed the paperwork back yesterday because the information was not sufficient.  Jackelyn, daughter, needs them filled out asap.  Can someone call and let her know that we received them and when they will be done.    Return call needed: Yes    OK to leave a message on voice mail? Yes    Primary language: English      needed? No    Call taken on February 24, 2022 at 2:07 PM by Chad Bralow

## 2022-03-22 ENCOUNTER — MEDICAL CORRESPONDENCE (OUTPATIENT)
Dept: HEALTH INFORMATION MANAGEMENT | Facility: CLINIC | Age: 62
End: 2022-03-22
Payer: COMMERCIAL

## 2022-03-22 ENCOUNTER — TRANSFERRED RECORDS (OUTPATIENT)
Dept: HEALTH INFORMATION MANAGEMENT | Facility: CLINIC | Age: 62
End: 2022-03-22
Payer: COMMERCIAL

## 2022-04-07 DIAGNOSIS — G89.29 CHRONIC BILATERAL LOW BACK PAIN WITH BILATERAL SCIATICA: ICD-10-CM

## 2022-04-07 DIAGNOSIS — M54.41 CHRONIC BILATERAL LOW BACK PAIN WITH BILATERAL SCIATICA: ICD-10-CM

## 2022-04-07 DIAGNOSIS — M54.42 CHRONIC BILATERAL LOW BACK PAIN WITH BILATERAL SCIATICA: ICD-10-CM

## 2022-04-07 RX ORDER — DULOXETIN HYDROCHLORIDE 60 MG/1
CAPSULE, DELAYED RELEASE ORAL
Qty: 90 CAPSULE | Refills: 0 | Status: CANCELLED | OUTPATIENT
Start: 2022-04-07

## 2022-04-15 RX ORDER — DULOXETIN HYDROCHLORIDE 60 MG/1
CAPSULE, DELAYED RELEASE ORAL
Qty: 90 CAPSULE | Refills: 1 | Status: SHIPPED | OUTPATIENT
Start: 2022-04-15 | End: 2022-10-06

## 2022-04-20 ENCOUNTER — DOCUMENTATION ONLY (OUTPATIENT)
Dept: FAMILY MEDICINE | Facility: CLINIC | Age: 62
End: 2022-04-20
Payer: COMMERCIAL

## 2022-04-20 NOTE — PROGRESS NOTES
To be completed in Nursing note:    Please reference list for forms that require a visit for completion.  Please remind patients that providers are given 3-5 business days to complete and return forms.      Form type: [employer] HOME HEALTH CERT AND PLAN OF CARE    Date form received: 4/10/22    Date form completed by Physician:22    How was form returned to patient (mailed, faxed, or at  for patient to ): FAXED [phone]    Date form mailed/faxed/left at  for patient and sent to HIM for scannin22    Once form is left for patient, faxed, or mailed PCS will then close the documentation only encounter. '    Wilbert Anguiano, EMT  2:40 PM  2022

## 2022-04-29 NOTE — TELEPHONE ENCOUNTER
Action 4/29/22 MV 9.14am   Action Taken Imaging request faxed to [facility]    --5/4/22 MV 11.11am--   Images resolved in PACS           RECORDS RECEIVED FROM: Internal   REASON FOR VISIT: CVA   Date of Appt: 7/27/22   NOTES (FOR ALL VISITS) STATUS DETAILS   OFFICE NOTE from referring provider Received Betsy LASSITER @ [facility]:  3/22/22   DISCHARGE SUMMARY from hospital Care Everywhere [facility]:  7/20/20-7/29/20  6/3/19-6/5/19    [facility]:  2/16/15-2/19/15   MEDICATION LIST Care Everywhere    IMAGING  (FOR ALL VISITS)     MRI (HEAD, NECK, SPINE) Received [facility]:  MRI Brain 3/2/22  MRA Head Neck Stroke 2/3/21  MRI Brain 6/3/19  MRI Brain 5/31/19    [facility]:  MRA Head 2/16/15  MRA Neck 2/16/15  MRI Brain 2/16/15   CT (HEAD, NECK, SPINE) Received [facility]:  CTA Head 5/31/19    [facility]:  CT Head 5/23/19  CT Head 2/16/15

## 2022-05-12 ENCOUNTER — TRANSFERRED RECORDS (OUTPATIENT)
Dept: HEALTH INFORMATION MANAGEMENT | Facility: CLINIC | Age: 62
End: 2022-05-12
Payer: COMMERCIAL

## 2022-05-21 NOTE — PROGRESS NOTES
Preceptor attestation:  Patient seen and discussed with the resident. Assessment and plan reviewed with resident and agreed upon.  Supervising physician: Froilan Salgado  [facility]     normal (ped)...

## 2022-06-21 ENCOUNTER — DOCUMENTATION ONLY (OUTPATIENT)
Dept: FAMILY MEDICINE | Facility: CLINIC | Age: 62
End: 2022-06-21
Payer: COMMERCIAL

## 2022-06-21 NOTE — PROGRESS NOTES
To be completed in Nursing note:     Please reference list for forms that require a visit for completion.  Please remind patients that providers are given 3-5 business days to complete and return forms.        Form type: [employer] HOME HEALTH CERT AND PLAN OF CARE     Date form received: 6/15/22     Date form completed by Physician:22       How was form returned to patient (mailed, faxed, or at  for patient to ): FAXED [phone]     Date form mailed/faxed/left at  for patient and sent to HIM for scannin22     Once form is left for patient, faxed, or mailed PCS will then close the documentation only encounter. '      Wilbert Anguiano, EMT  5:00 PM  2022

## 2022-07-22 ENCOUNTER — DOCUMENTATION ONLY (OUTPATIENT)
Dept: FAMILY MEDICINE | Facility: CLINIC | Age: 62
End: 2022-07-22

## 2022-07-22 ENCOUNTER — MEDICAL CORRESPONDENCE (OUTPATIENT)
Dept: HEALTH INFORMATION MANAGEMENT | Facility: CLINIC | Age: 62
End: 2022-07-22

## 2022-07-22 NOTE — PROGRESS NOTES
To be completed in Nursing note:    Please reference list for forms that require a visit for completion.  Please remind patients that providers are given 3-5 business days to complete and return forms.      Form type: PRESCRIBED DIET FORM / [facility] HUMAN SERVICES DEPT    Date form received: 22    Date form completed by Physician:22    How was form returned to patient (mailed, faxed, or at  for patient to ): MAILED TO PATIENT   AND [facility]    Date form mailed/faxed/left at  for patient and sent to HIM for scannin22     Once form is left for patient, faxed, or mailed PCS will then close the documentation only encounter.     Wilbert Anguiano, EMT  4:45 PM  2022

## 2022-07-27 ENCOUNTER — PRE VISIT (OUTPATIENT)
Dept: NEUROLOGY | Facility: CLINIC | Age: 62
End: 2022-07-27
Payer: COMMERCIAL

## 2022-08-19 ENCOUNTER — TRANSFERRED RECORDS (OUTPATIENT)
Dept: HEALTH INFORMATION MANAGEMENT | Facility: CLINIC | Age: 62
End: 2022-08-19

## 2022-09-01 ENCOUNTER — TRANSFERRED RECORDS (OUTPATIENT)
Dept: HEALTH INFORMATION MANAGEMENT | Facility: CLINIC | Age: 62
End: 2022-09-01

## 2022-09-23 ENCOUNTER — DOCUMENTATION ONLY (OUTPATIENT)
Dept: FAMILY MEDICINE | Facility: CLINIC | Age: 62
End: 2022-09-23

## 2022-09-23 NOTE — PROGRESS NOTES
To be completed in Nursing note:    Please reference list for forms that require a visit for completion.  Please remind patients that providers are given 3-5 business days to complete and return forms.      Form type: FMLA Certification of Health Care Provider    Date form received: 9/22/22    Date form completed by Physician:9/27/22; 10/4/2022 see Dr. Covarrubias for the [place] form    How was form returned to patient (mailed, faxed, or at  for patient to ): Return to [facility]    FAXED to [phone] [facility]     Date form mailed/faxed/left at  for patient and sent to HIM for scanning:    Faxed on 10/4/2022    Once form is left for patient, faxed, or mailed PCS will then close the documentation only encounter.

## 2022-10-04 ENCOUNTER — OFFICE VISIT (OUTPATIENT)
Dept: FAMILY MEDICINE | Facility: CLINIC | Age: 62
End: 2022-10-04
Payer: COMMERCIAL

## 2022-10-04 VITALS
BODY MASS INDEX: 18.05 KG/M2 | SYSTOLIC BLOOD PRESSURE: 161 MMHG | TEMPERATURE: 98.5 F | HEART RATE: 98 BPM | RESPIRATION RATE: 18 BRPM | WEIGHT: 125.8 LBS | OXYGEN SATURATION: 96 % | DIASTOLIC BLOOD PRESSURE: 91 MMHG

## 2022-10-04 DIAGNOSIS — Z13.220 SCREENING FOR HYPERLIPIDEMIA: ICD-10-CM

## 2022-10-04 DIAGNOSIS — I10 ESSENTIAL HYPERTENSION: ICD-10-CM

## 2022-10-04 DIAGNOSIS — Z79.899 ENCOUNTER FOR LONG-TERM (CURRENT) USE OF MEDICATIONS: Primary | ICD-10-CM

## 2022-10-04 DIAGNOSIS — G62.9 NEUROPATHY: ICD-10-CM

## 2022-10-04 DIAGNOSIS — Z23 HIGH PRIORITY FOR 2019-NCOV VACCINE: ICD-10-CM

## 2022-10-04 DIAGNOSIS — J45.40 MODERATE PERSISTENT ASTHMA WITHOUT COMPLICATION: ICD-10-CM

## 2022-10-04 DIAGNOSIS — Z11.59 NEED FOR HEPATITIS C SCREENING TEST: ICD-10-CM

## 2022-10-04 LAB
ALBUMIN SERPL BCG-MCNC: 4 G/DL (ref 3.5–5.2)
ALP SERPL-CCNC: 64 U/L (ref 40–129)
ALT SERPL W P-5'-P-CCNC: 9 U/L (ref 10–50)
ANION GAP SERPL CALCULATED.3IONS-SCNC: 12 MMOL/L (ref 7–15)
AST SERPL W P-5'-P-CCNC: 17 U/L (ref 10–50)
BILIRUB SERPL-MCNC: 0.2 MG/DL
BUN SERPL-MCNC: 20.3 MG/DL (ref 8–23)
CALCIUM SERPL-MCNC: 8.9 MG/DL (ref 8.8–10.2)
CHLORIDE SERPL-SCNC: 110 MMOL/L (ref 98–107)
CHOLEST SERPL-MCNC: 225 MG/DL
CREAT SERPL-MCNC: 1.63 MG/DL (ref 0.67–1.17)
DEPRECATED HCO3 PLAS-SCNC: 19 MMOL/L (ref 22–29)
GFR SERPL CREATININE-BSD FRML MDRD: 47 ML/MIN/1.73M2
GLUCOSE SERPL-MCNC: 92 MG/DL (ref 70–99)
HCV AB SERPL QL IA: NONREACTIVE
HDLC SERPL-MCNC: 92 MG/DL
LDLC SERPL CALC-MCNC: 112 MG/DL
NONHDLC SERPL-MCNC: 133 MG/DL
POTASSIUM SERPL-SCNC: 4.4 MMOL/L (ref 3.4–5.3)
PROT SERPL-MCNC: 7.4 G/DL (ref 6.4–8.3)
SODIUM SERPL-SCNC: 141 MMOL/L (ref 136–145)
TRIGL SERPL-MCNC: 107 MG/DL

## 2022-10-04 PROCEDURE — 86803 HEPATITIS C AB TEST: CPT

## 2022-10-04 PROCEDURE — 0134A COVID-19,PF,MODERNA BIVALENT: CPT

## 2022-10-04 PROCEDURE — 90686 IIV4 VACC NO PRSV 0.5 ML IM: CPT

## 2022-10-04 PROCEDURE — 80053 COMPREHEN METABOLIC PANEL: CPT

## 2022-10-04 PROCEDURE — 36415 COLL VENOUS BLD VENIPUNCTURE: CPT

## 2022-10-04 PROCEDURE — 80061 LIPID PANEL: CPT

## 2022-10-04 PROCEDURE — 90471 IMMUNIZATION ADMIN: CPT

## 2022-10-04 PROCEDURE — 99214 OFFICE O/P EST MOD 30 MIN: CPT | Mod: 25

## 2022-10-04 PROCEDURE — 91313 COVID-19,PF,MODERNA BIVALENT: CPT

## 2022-10-04 RX ORDER — IPRATROPIUM BROMIDE AND ALBUTEROL SULFATE 2.5; .5 MG/3ML; MG/3ML
1 SOLUTION RESPIRATORY (INHALATION) EVERY 6 HOURS PRN
Qty: 90 ML | Refills: 1 | Status: SHIPPED | OUTPATIENT
Start: 2022-10-04 | End: 2023-05-16

## 2022-10-04 RX ORDER — AMLODIPINE BESYLATE 10 MG/1
10 TABLET ORAL DAILY
Qty: 90 TABLET | Refills: 1 | Status: SHIPPED | OUTPATIENT
Start: 2022-10-04 | End: 2023-12-05

## 2022-10-04 RX ORDER — GABAPENTIN 300 MG/1
300 CAPSULE ORAL 2 TIMES DAILY
Qty: 60 CAPSULE | Refills: 1 | Status: SHIPPED | OUTPATIENT
Start: 2022-10-04 | End: 2024-03-25

## 2022-10-04 RX ORDER — CARVEDILOL 3.12 MG/1
3.12 TABLET ORAL 2 TIMES DAILY WITH MEALS
Qty: 60 TABLET | Refills: 3 | Status: SHIPPED | OUTPATIENT
Start: 2022-10-04 | End: 2023-12-05

## 2022-10-04 RX ORDER — GABAPENTIN 100 MG/1
100 CAPSULE ORAL 2 TIMES DAILY
Qty: 60 CAPSULE | Refills: 1 | Status: SHIPPED | OUTPATIENT
Start: 2022-10-04 | End: 2024-05-10

## 2022-10-04 RX ORDER — LOSARTAN POTASSIUM 50 MG/1
50 TABLET ORAL DAILY
Qty: 90 TABLET | Refills: 0 | Status: SHIPPED | OUTPATIENT
Start: 2022-10-04 | End: 2023-01-13

## 2022-10-04 ASSESSMENT — ASTHMA QUESTIONNAIRES: ACT_TOTALSCORE: 17

## 2022-10-04 NOTE — LETTER
October 17, 2022      Peter Vasquezowan  [address]   [place] [address]        Dear ,    We are writing to inform you of your test results.    Your hepatitis C antibody was nonreactive, indicating that you do not have hepatitis C.      Your lipid panel, which checks your cholesterol, showed that you have high cholesterol, and above desirable LDL cholesterol and non-HDL cholesterol.  This is decreased from your last lipid panel.  Focus on diet and exercise for now, including trying to eat less unsaturated fats, cook with less oil, and focus on baked rather than fried foods. The American Heart Association recommends that adults try to get 150 minutes of moderately vigorous exercise weekly, which roughly breaks down to 20 - 30 minutes a day.  Elevated cholesterol can increase your risk for heart disease.    Your comprehensive metabolic panel showed that you have an elevated creatinine of 1.63.  Creatinine is a measure of your kidney function and being elevated may indicate that your kidneys are not performing optimally, or may indicate that your chronic kidney disease has worsened. I would like you to see us again in clinic to further discuss the management of your chronic kidney disease, and to recheck your comprehensive metabolic panel to recheck your creatinine. Try to stay hydrated and drink water throughout the day.    Please call the clinic with any questions or concerns, and please schedule a follow up appointment for 1 month from now.     Thank you,  Nishant Covarrubias, DO      Resulted Orders   Hepatitis C Screen Reflex to HCV RNA Quant and Genotype   Result Value Ref Range    Hepatitis C Antibody Nonreactive Nonreactive    Narrative    Assay performance characteristics have not been established for newborns, infants, and children.   Lipid panel reflex to direct LDL Non-fasting   Result Value Ref Range    Cholesterol 225 (H) <200 mg/dL    Triglycerides 107 <150 mg/dL    Direct Measure HDL 92 >=40  mg/dL    LDL Cholesterol Calculated 112 (H) <=100 mg/dL    Non HDL Cholesterol 133 (H) <130 mg/dL    Narrative    Cholesterol  Desirable:  <200 mg/dL    Triglycerides  Normal:  Less than 150 mg/dL  Borderline High:  150-199 mg/dL  High:  200-499 mg/dL  Very High:  Greater than or equal to 500 mg/dL    Direct Measure HDL  Female:  Greater than or equal to 50 mg/dL   Male:  Greater than or equal to 40 mg/dL    LDL Cholesterol  Desirable:  <100mg/dL  Above Desirable:  100-129 mg/dL   Borderline High:  130-159 mg/dL   High:  160-189 mg/dL   Very High:  >= 190 mg/dL    Non HDL Cholesterol  Desirable:  130 mg/dL  Above Desirable:  130-159 mg/dL  Borderline High:  160-189 mg/dL  High:  190-219 mg/dL  Very High:  Greater than or equal to 220 mg/dL   Comprehensive metabolic panel   Result Value Ref Range    Sodium 141 136 - 145 mmol/L    Potassium 4.4 3.4 - 5.3 mmol/L    Chloride 110 (H) 98 - 107 mmol/L    Carbon Dioxide (CO2) 19 (L) 22 - 29 mmol/L    Anion Gap 12 7 - 15 mmol/L    Urea Nitrogen 20.3 8.0 - 23.0 mg/dL    Creatinine 1.63 (H) 0.67 - 1.17 mg/dL    Calcium 8.9 8.8 - 10.2 mg/dL    Glucose 92 70 - 99 mg/dL    Alkaline Phosphatase 64 40 - 129 U/L    AST 17 10 - 50 U/L    ALT 9 (L) 10 - 50 U/L    Protein Total 7.4 6.4 - 8.3 g/dL    Albumin 4.0 3.5 - 5.2 g/dL    Bilirubin Total 0.2 <=1.2 mg/dL    GFR Estimate 47 (L) >60 mL/min/1.73m2      Comment:      Effective December 21, 2021 eGFRcr in adults is calculated using the 2021 CKD-EPI creatinine equation which includes age and gender (Samantha et al., NEJM, DOI: 10.1056/NDXTkr1604464)       If you have any questions or concerns, please call the clinic at the number listed above.       Sincerely,      Chadd Quintero MD

## 2022-10-04 NOTE — PROGRESS NOTES
Assessment & Plan     Encounter for long-term (current) use of medications  Patient following up today for medication refill of his hypertension medication as well as his other medications.  He is unsure which medications on his med list he is actually taking, or which ones he needs refilled besides his blood pressure medication and gabapentin.  We will refill these today.  He will follow-up in 2 to 4 weeks and bring a list of his medications, or call the clinic if there are any other medications that he needs to have refilled.    Essential hypertension  Patient hypertensive today at 161/91. He is asymptomatic at today's visit and states that he is out of his blood pressure medication and would like it refilled.  Has a history of chronic kidney disease with baseline creatinine around 1.3-1.5.  We will assess his kidney function with a CMP and send in refills for his amlodipine, carvedilol, losartan.  We will have him follow-up in 2 to 4 weeks to go over test results and assess his blood pressure management at that time.  - amLODIPine (NORVASC) 10 MG tablet; Take 1 tablet (10 mg) by mouth daily  - carvedilol (COREG) 3.125 MG tablet; Take 1 tablet (3.125 mg) by mouth 2 times daily (with meals)  - losartan (COZAAR) 50 MG tablet; Take 1 tablet (50 mg) by mouth daily  - Comprehensive metabolic panel    Moderate persistent asthma without complication  Not currently in an acute asthma exacerbation, and denies recent history of such.  ACT was 17 today.  Patient previously utilized a nebulizer and duo nebs and would like a prescription for this again.  States that he needs the nebulizer supplies, we will provide this to him.  Would like to have him follow-up for recheck of his asthma symptoms, and consider starting Symbicort. Considering his smoking history, would also like to order PFTs at the next visit to evaluate if this is asthma vs. COPD.   - Nebulizer and Supplies Order for DME - ONLY FOR DME  - ipratropium -  "albuterol 0.5 mg/2.5 mg/3 mL (DUONEB) 0.5-2.5 (3) MG/3ML neb solution; Take 1 vial (3 mLs) by nebulization every 6 hours as needed for shortness of breath / dyspnea or wheezing    Neuropathy  Patient requested refill of his gabapentin.  Currently well controlled on gabapentin 400 mg twice daily.  - gabapentin (NEURONTIN) 300 MG capsule; Take 1 capsule (300 mg) by mouth 2 times daily  - gabapentin (NEURONTIN) 100 MG capsule; Take 1 capsule (100 mg) by mouth 2 times daily    Need for hepatitis C screening test  - Hepatitis C Screen Reflex to HCV RNA Quant and Genotype; Future  - Hepatitis C Screen Reflex to HCV RNA Quant and Genotype    Screening for hyperlipidemia  - Lipid panel reflex to direct LDL Non-fasting; Future  - Lipid panel reflex to direct LDL Non-fasting    High priority for 2019-nCoV vaccine  - COVID-19,PF,MODERNA BIVALENT 18+Yrs    0956}     Nicotine/Tobacco Cessation:  He reports that he has been smoking cigarettes. He has been smoking about 0.50 packs per day. He has never used smokeless tobacco.  Nicotine/Tobacco Cessation Plan:   Information offered: Patient not interested at this time      Follow Up:  - Follow up in 2-4 weeks to re-evaluate blood pressure and asthma. We will discuss test results at this time as well.    Nishant Covarrubias [facility]        Subjective   Patient is a 62-year-old male presenting today for medication refill and to have FMLA form completed.  He states that he needs a refill of all his medications but is unsure which medications he is taking, and says that \"I take what they give me\".  He has previously had FMLA forms filled out, with his last letter being sent 02/25/2022.  He has metastatic adenocarcinoma and requires his daughter's assistance for his appointments, medical treatments, and to assist him with his needs.  Daughter takes approximately 1 to 3 days off of work every week in order to assist the patient.  He is following with Dr. Rivas " with [facility] and has a follow-up with them on 10/7/2022.    Additionally, he reports that he feels his asthma has been getting worse over the past month or so, and he uses albuterol inhaler almost every day.  Albuterol still provides relief, and he previously utilized a nebulizer which he states worked better for symptoms.  Denies any recent acute exacerbations, shortness of breath, chest pain, fevers or chills, or any recent sicknesses.      Review of Systems   Constitutional, HEENT, cardiovascular, pulmonary, gi and gu systems are negative, except as otherwise noted.      Objective    There were no vitals taken for this visit.  There is no height or weight on file to calculate BMI.  Physical Exam     Constitutional: Awake, alert, cooperative, no apparent distress, and appears stated age.  Eyes: Lids and lashes normal, pupils equal, round and reactive to light, extra ocular muscles intact, sclera clear, conjunctiva normal.  ENT: Normocephalic, atraumatic. Dry mucous membranes.  Hematologic / Lymphatic: No cervical lymphadenopathy.  Respiratory: No increased work of breathing, no accessory muscle use. Diminished lung sounds bilaterally. No crackles or wheezes  Cardiovascular: Regular rate and rhythm, normal S1 and S2, no S3 or S4, and no murmur noted  GI: Abdomen soft, non-tender, non-distended, no masses palpated. Bowel sounds present.  Skin: No bruising or bleeding and normal skin color, texture, turgor.  Musculoskeletal: There is no redness, warmth, or swelling of the joints.  Full range of motion noted.  Neurologic: Awake, alert, oriented to name, place and time.  Cranial nerves II-XII are grossly intact. Strength 4/5 LUE and LLE with contracture of left hand currently at baseline.

## 2022-10-06 DIAGNOSIS — M54.42 CHRONIC BILATERAL LOW BACK PAIN WITH BILATERAL SCIATICA: ICD-10-CM

## 2022-10-06 DIAGNOSIS — M54.41 CHRONIC BILATERAL LOW BACK PAIN WITH BILATERAL SCIATICA: ICD-10-CM

## 2022-10-06 DIAGNOSIS — G89.29 CHRONIC BILATERAL LOW BACK PAIN WITH BILATERAL SCIATICA: ICD-10-CM

## 2022-10-06 RX ORDER — DULOXETIN HYDROCHLORIDE 60 MG/1
CAPSULE, DELAYED RELEASE ORAL
Qty: 90 CAPSULE | Refills: 1 | Status: SHIPPED | OUTPATIENT
Start: 2022-10-06 | End: 2024-03-29

## 2022-10-07 ENCOUNTER — TRANSFERRED RECORDS (OUTPATIENT)
Dept: HEALTH INFORMATION MANAGEMENT | Facility: CLINIC | Age: 62
End: 2022-10-07

## 2022-10-10 ENCOUNTER — DOCUMENTATION ONLY (OUTPATIENT)
Dept: FAMILY MEDICINE | Facility: CLINIC | Age: 62
End: 2022-10-10

## 2022-10-10 NOTE — PROGRESS NOTES
To be completed in Nursing note:    Please reference list for forms that require a visit for completion.  Please remind patients that providers are given 3-5 business days to complete and return forms.      Form type:Home Health Certification and Plan of Care, certification period 10/11/22-12/9/22    Date form received: 10/10/22    Date form completed by Physician:10/11/22    How was form returned to patient (mailed, faxed, or at  for patient to ): Fax to [employer] at [phone]    Date form mailed/faxed/left at  for patient and sent to HIM for scanning: Faxed 10/11/22 at 5:10pm      Once form is left for patient, faxed, or mailed PCS will then close the documentation only encounter.

## 2022-10-13 ENCOUNTER — TRANSFERRED RECORDS (OUTPATIENT)
Dept: HEALTH INFORMATION MANAGEMENT | Facility: CLINIC | Age: 62
End: 2022-10-13

## 2022-11-30 ENCOUNTER — TRANSFERRED RECORDS (OUTPATIENT)
Dept: HEALTH INFORMATION MANAGEMENT | Facility: CLINIC | Age: 62
End: 2022-11-30

## 2022-12-09 ENCOUNTER — TRANSFERRED RECORDS (OUTPATIENT)
Dept: HEALTH INFORMATION MANAGEMENT | Facility: CLINIC | Age: 62
End: 2022-12-09

## 2023-01-11 ENCOUNTER — TRANSFERRED RECORDS (OUTPATIENT)
Dept: HEALTH INFORMATION MANAGEMENT | Facility: CLINIC | Age: 63
End: 2023-01-11

## 2023-01-11 DIAGNOSIS — I10 ESSENTIAL HYPERTENSION: ICD-10-CM

## 2023-01-13 RX ORDER — LOSARTAN POTASSIUM 50 MG/1
TABLET ORAL
Qty: 90 TABLET | Refills: 0 | Status: SHIPPED | OUTPATIENT
Start: 2023-01-13 | End: 2023-12-05

## 2023-01-27 ENCOUNTER — TRANSFERRED RECORDS (OUTPATIENT)
Dept: HEALTH INFORMATION MANAGEMENT | Facility: CLINIC | Age: 63
End: 2023-01-27
Payer: COMMERCIAL

## 2023-02-14 ENCOUNTER — DOCUMENTATION ONLY (OUTPATIENT)
Dept: FAMILY MEDICINE | Facility: CLINIC | Age: 63
End: 2023-02-14
Payer: COMMERCIAL

## 2023-02-14 DIAGNOSIS — Z53.9 DIAGNOSIS NOT YET DEFINED: Primary | ICD-10-CM

## 2023-02-14 NOTE — PROGRESS NOTES
To be completed in Nursing note:    Please reference list for forms that require a visit for completion.  Please remind patients that providers are given 3-5 business days to complete and return forms.      Form type: Home Health Certification and Plan of Care, cert. Period 12/10/22 to 2/7/23    Date form received: 2/13/23    Date form completed by Physician:2/14/23    How was form returned to patient (mailed, faxed, or at  for patient to ): Fax to [employer] at [phone]    Date form mailed/faxed/left at  for patient and sent to HIM for scanning: Faxed 2/14/23 at 2:17pm      Once form is left for patient, faxed, or mailed PCS will then close the documentation only encounter.

## 2023-03-08 ENCOUNTER — TRANSFERRED RECORDS (OUTPATIENT)
Dept: HEALTH INFORMATION MANAGEMENT | Facility: CLINIC | Age: 63
End: 2023-03-08
Payer: COMMERCIAL

## 2023-03-20 ENCOUNTER — TRANSFERRED RECORDS (OUTPATIENT)
Dept: HEALTH INFORMATION MANAGEMENT | Facility: CLINIC | Age: 63
End: 2023-03-20
Payer: COMMERCIAL

## 2023-03-21 ENCOUNTER — VIRTUAL VISIT (OUTPATIENT)
Dept: NEUROLOGY | Facility: CLINIC | Age: 63
End: 2023-03-21
Payer: COMMERCIAL

## 2023-03-21 DIAGNOSIS — Z91.199 NO-SHOW FOR APPOINTMENT: Primary | ICD-10-CM

## 2023-03-21 NOTE — PROGRESS NOTES
Called [phone] and spoke to daughter. She gave me a number [phone]. I called that number and the person who answered said it was his girl friend. I explained that it was a video or phone visit and not an in person visit. She explained that he was asleep and not available but could possibly do the visit at noon. Since there are other patients at that time, she will call and reschedule.  This patient was a no show for this scheduled appointment.

## 2023-03-21 NOTE — LETTER
3/21/2023       RE: Peter Perez  [address]  [place] [address]     Dear Colleague,    Thank you for referring your patient, Peter Perez, to the [facility]. Please see a copy of my visit note below.    Called [phone] and spoke to daughter. She gave me a number [phone]. I called that number and the person who answered said it was his girl friend. I explained that it was a video or phone visit and not an in person visit. She explained that he was asleep and not available but could possibly do the visit at noon. Since there are other patients at that time, she will call and reschedule.  This patient was a no show for this scheduled appointment.      Again, thank you for allowing me to participate in the care of your patient.      Sincerely,    Rakesh Coronel MD

## 2023-03-23 ENCOUNTER — OFFICE VISIT (OUTPATIENT)
Dept: FAMILY MEDICINE | Facility: CLINIC | Age: 63
End: 2023-03-23
Payer: COMMERCIAL

## 2023-03-23 VITALS
OXYGEN SATURATION: 97 % | HEIGHT: 70 IN | DIASTOLIC BLOOD PRESSURE: 84 MMHG | TEMPERATURE: 98.3 F | RESPIRATION RATE: 12 BRPM | BODY MASS INDEX: 18.67 KG/M2 | HEART RATE: 86 BPM | WEIGHT: 130.4 LBS | SYSTOLIC BLOOD PRESSURE: 149 MMHG

## 2023-03-23 DIAGNOSIS — Z02.89 ENCOUNTER FOR COMPLETION OF FORM WITH PATIENT: Primary | ICD-10-CM

## 2023-03-23 PROCEDURE — 99213 OFFICE O/P EST LOW 20 MIN: CPT | Mod: GC | Performed by: STUDENT IN AN ORGANIZED HEALTH CARE EDUCATION/TRAINING PROGRAM

## 2023-03-23 NOTE — NURSING NOTE
To be completed in Nursing note:    Please reference list for forms that require a visit for completion.  Please remind patients that providers are given 3-5 business days to complete and return forms.      Form type:LA    Date form received: 3/17/23    Date form completed by Physician:3/23/23    How was form returned to patient (mailed, faxed, or at  for patient to ):Give original copy to patient    Date form mailed/faxed/left at  for patient and sent to HIM for scanning: Done 3/23/23-original giving to patient at apptment      Once form is left for patient, faxed, or mailed PCS will then close the documentation only encounter.
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
## 2023-03-23 NOTE — PROGRESS NOTES
Preceptor Attestation:    I discussed the patient with the resident and evaluated the patient in person. I have verified the content of the note, which accurately reflects my assessment of the patient and the plan of care.   Supervising Physician:  Jg Beal MD.

## 2023-03-29 ENCOUNTER — TRANSFERRED RECORDS (OUTPATIENT)
Dept: HEALTH INFORMATION MANAGEMENT | Facility: CLINIC | Age: 63
End: 2023-03-29
Payer: COMMERCIAL

## 2023-05-01 ENCOUNTER — DOCUMENTATION ONLY (OUTPATIENT)
Dept: FAMILY MEDICINE | Facility: CLINIC | Age: 63
End: 2023-05-01
Payer: COMMERCIAL

## 2023-05-01 NOTE — PROGRESS NOTES
To be completed in Nursing note:    Please reference list for forms that require a visit for completion.  Please remind patients that providers are given 3-5 business days to complete and return forms.      Form type:Home Health Certification and Plan of Care, cert period 4/9/23-6/7/23    Date form received: 4/30/23    Date form completed by Physician:5/1/23    How was form returned to patient (mailed, faxed, or at  for patient to ):Fax to [employer] at [phone]    Date form mailed/faxed/left at  for patient and sent to HIM for scanning: Faxed 5/3/2023 at 1:49pm      Once form is left for patient, faxed, or mailed PCS will then close the documentation only encounter.

## 2023-05-16 ENCOUNTER — OFFICE VISIT (OUTPATIENT)
Dept: FAMILY MEDICINE | Facility: CLINIC | Age: 63
End: 2023-05-16
Payer: COMMERCIAL

## 2023-05-16 VITALS
WEIGHT: 122 LBS | TEMPERATURE: 98.2 F | SYSTOLIC BLOOD PRESSURE: 128 MMHG | BODY MASS INDEX: 17.51 KG/M2 | OXYGEN SATURATION: 96 % | RESPIRATION RATE: 16 BRPM | DIASTOLIC BLOOD PRESSURE: 78 MMHG | HEART RATE: 106 BPM

## 2023-05-16 DIAGNOSIS — J44.9 OBSTRUCTIVE LUNG DISEASE (GENERALIZED) (H): Primary | ICD-10-CM

## 2023-05-16 DIAGNOSIS — I69.954 FLACCID HEMIPLEGIA OF LEFT NONDOMINANT SIDE AS LATE EFFECT OF CEREBROVASCULAR DISEASE, UNSPECIFIED CEREBROVASCULAR DISEASE TYPE (H): ICD-10-CM

## 2023-05-16 DIAGNOSIS — M17.0 PRIMARY OSTEOARTHRITIS OF BOTH KNEES: ICD-10-CM

## 2023-05-16 DIAGNOSIS — R64 CACHECTIC (H): ICD-10-CM

## 2023-05-16 DIAGNOSIS — G62.9 NEUROPATHY: ICD-10-CM

## 2023-05-16 DIAGNOSIS — Z02.89 ENCOUNTER FOR COMPLETION OF FORM WITH PATIENT: ICD-10-CM

## 2023-05-16 PROCEDURE — 99214 OFFICE O/P EST MOD 30 MIN: CPT | Mod: GC | Performed by: STUDENT IN AN ORGANIZED HEALTH CARE EDUCATION/TRAINING PROGRAM

## 2023-05-16 RX ORDER — LACTOSE-REDUCED FOOD
1 LIQUID (ML) ORAL DAILY
Qty: 237 ML | Refills: 99 | Status: SHIPPED | OUTPATIENT
Start: 2023-05-16

## 2023-05-16 RX ORDER — IPRATROPIUM BROMIDE AND ALBUTEROL SULFATE 2.5; .5 MG/3ML; MG/3ML
1 SOLUTION RESPIRATORY (INHALATION) EVERY 6 HOURS PRN
Qty: 90 ML | Refills: 1 | Status: SHIPPED | OUTPATIENT
Start: 2023-05-16 | End: 2023-12-05

## 2023-05-16 RX ORDER — ALBUTEROL SULFATE 90 UG/1
2 AEROSOL, METERED RESPIRATORY (INHALATION) EVERY 6 HOURS PRN
Qty: 8.5 G | Refills: 3 | Status: SHIPPED | OUTPATIENT
Start: 2023-05-16 | End: 2024-03-29

## 2023-05-16 NOTE — PROGRESS NOTES
Preceptor Attestation:   Patient seen, evaluated and discussed with the resident. I have verified the content of the note, which accurately reflects my assessment of the patient and the plan of care.   Supervising Physician:  José Luis Sorensen MD
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
## 2023-05-16 NOTE — PATIENT INSTRUCTIONS
DME location  [facility]  [address], [place] [address]  [phone]  [email or web address]

## 2023-05-19 ENCOUNTER — TELEPHONE (OUTPATIENT)
Dept: NEUROLOGY | Facility: CLINIC | Age: 63
End: 2023-05-19
Payer: COMMERCIAL

## 2023-05-19 NOTE — TELEPHONE ENCOUNTER
Unable to LVM for the patient to call back and schedule the following:    Appointment type: New Stroke (Video)  Provider: Dr. Coronel  Return date: First available  Specialty phone number: [phone]  Additional appointment(s) needed: N/A  Additonal Notes: N/A    Calling per request from Goldie Maldonado RN to schedule patient. Unable to LVM, sent letter.    Jama Wolff on 5/19/2023 at 5:22 PM

## 2023-06-05 ENCOUNTER — DOCUMENTATION ONLY (OUTPATIENT)
Dept: FAMILY MEDICINE | Facility: CLINIC | Age: 63
End: 2023-06-05
Payer: COMMERCIAL

## 2023-06-05 NOTE — PROGRESS NOTES
To be completed in Nursing note:    Please reference list for forms that require a visit for completion.  Please remind patients that providers are given 3-5 business days to complete and return forms.      Form type: Home Health Certification and Plan of Care, cert. Period 6/8/23-8/6/23    Date form received: 6/5/23    Date form completed by Physician:    How was form returned to patient (mailed, faxed, or at  for patient to ): Fax to [email or web address] at [phone]    Date form mailed/faxed/left at  for patient and sent to HIM for scanning: Faxed 6/6/23 at 12:07pm      Once form is left for patient, faxed, or mailed PCS will then close the documentation only encounter.

## 2023-06-08 DIAGNOSIS — Z53.9 DIAGNOSIS NOT YET DEFINED: Primary | ICD-10-CM

## 2023-06-12 ENCOUNTER — TRANSFERRED RECORDS (OUTPATIENT)
Dept: HEALTH INFORMATION MANAGEMENT | Facility: CLINIC | Age: 63
End: 2023-06-12
Payer: COMMERCIAL

## 2023-06-20 ENCOUNTER — TELEPHONE (OUTPATIENT)
Dept: NEUROLOGY | Facility: CLINIC | Age: 63
End: 2023-06-20
Payer: COMMERCIAL

## 2023-06-20 NOTE — TELEPHONE ENCOUNTER
LATESHA, sent Letter for pt to resched with Dr. Penaloza.  clinics held on 7/11 and 7/18. However, please schedule the pts in chronological order, so there are no gaps in her schedule    6/20/23 BD

## 2023-06-21 NOTE — TELEPHONE ENCOUNTER
RECORDS RECEIVED FROM: external   REASON FOR VISIT: Stroke   Date of Appt: 8/15/23   NOTES (FOR ALL VISITS) STATUS DETAILS   OFFICE NOTE from referring provider Received Betsy LASSITER @ [facility]:  3/22/22   DISCHARGE SUMMARY from hospital Care Everywhere [facility]:  7/20/20-7/29/20  6/3/19-6/5/19     [facility]:  2/16/15-2/19/15   MEDICATION LIST Care Everywhere    IMAGING  (FOR ALL VISITS)     MRI (HEAD, NECK, SPINE) PACS [facility]:  MRI Brain 3/2/22  MRA Head Neck Stroke 2/3/21  MRI Brain 6/3/19  MRI Brain 5/31/19     [facility]:  MRA Head 2/16/15  MRA Neck 2/16/15  MRI Brain 2/16/15   CT (HEAD, NECK, SPINE) PACS [facility]:  CTA Head 5/31/19     [facility]:  CT Head 5/23/19  CT Head 2/16/15

## 2023-08-11 DIAGNOSIS — Z53.9 DIAGNOSIS NOT YET DEFINED: Primary | ICD-10-CM

## 2023-08-15 ENCOUNTER — PRE VISIT (OUTPATIENT)
Dept: NEUROLOGY | Facility: CLINIC | Age: 63
End: 2023-08-15

## 2023-08-16 ENCOUNTER — TRANSFERRED RECORDS (OUTPATIENT)
Dept: HEALTH INFORMATION MANAGEMENT | Facility: CLINIC | Age: 63
End: 2023-08-16
Payer: COMMERCIAL

## 2023-08-25 ENCOUNTER — DOCUMENTATION ONLY (OUTPATIENT)
Dept: FAMILY MEDICINE | Facility: CLINIC | Age: 63
End: 2023-08-25

## 2023-08-25 ENCOUNTER — OFFICE VISIT (OUTPATIENT)
Dept: FAMILY MEDICINE | Facility: CLINIC | Age: 63
End: 2023-08-25
Payer: COMMERCIAL

## 2023-08-25 VITALS
SYSTOLIC BLOOD PRESSURE: 166 MMHG | HEART RATE: 90 BPM | RESPIRATION RATE: 16 BRPM | DIASTOLIC BLOOD PRESSURE: 81 MMHG | OXYGEN SATURATION: 99 % | TEMPERATURE: 99 F | WEIGHT: 122 LBS | BODY MASS INDEX: 17.51 KG/M2

## 2023-08-25 DIAGNOSIS — I63.40 CEREBRAL INFARCTION DUE TO EMBOLISM OF CEREBRAL ARTERY (H): ICD-10-CM

## 2023-08-25 DIAGNOSIS — C79.51 ADENOCARCINOMA METASTATIC TO RIGHT FEMUR (H): ICD-10-CM

## 2023-08-25 DIAGNOSIS — N18.31 STAGE 3A CHRONIC KIDNEY DISEASE (H): Primary | ICD-10-CM

## 2023-08-25 PROCEDURE — 99212 OFFICE O/P EST SF 10 MIN: CPT | Mod: GC

## 2023-08-25 ASSESSMENT — ENCOUNTER SYMPTOMS
ARTHRALGIAS: 1
MYALGIAS: 1
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
## 2023-08-25 NOTE — PROGRESS NOTES
To be completed in Nursing note:    Please reference list for forms that require a visit for completion.  Please remind patients that providers are given 3-5 business days to complete and return forms.      Form type:  Special Diet Supplement     Date form received: 23    Date form completed by Physician:  23    How was form returned to patient (mailed, faxed, or at  for patient to ): faxed to [facility] [phone]    Date form mailed/faxed/left at  for patient and sent to HIM for [email or web address]      Once form is left for patient, faxed, or mailed PCS will then close the documentation only encounter.

## 2023-10-06 ENCOUNTER — TRANSFERRED RECORDS (OUTPATIENT)
Dept: HEALTH INFORMATION MANAGEMENT | Facility: CLINIC | Age: 63
End: 2023-10-06

## 2023-10-17 ENCOUNTER — TRANSFERRED RECORDS (OUTPATIENT)
Dept: HEALTH INFORMATION MANAGEMENT | Facility: CLINIC | Age: 63
End: 2023-10-17
Payer: COMMERCIAL

## 2023-12-05 ENCOUNTER — OFFICE VISIT (OUTPATIENT)
Dept: FAMILY MEDICINE | Facility: CLINIC | Age: 63
End: 2023-12-05
Payer: COMMERCIAL

## 2023-12-05 VITALS
BODY MASS INDEX: 16.61 KG/M2 | WEIGHT: 116 LBS | HEIGHT: 70 IN | RESPIRATION RATE: 20 BRPM | DIASTOLIC BLOOD PRESSURE: 92 MMHG | SYSTOLIC BLOOD PRESSURE: 156 MMHG | HEART RATE: 97 BPM | OXYGEN SATURATION: 100 % | TEMPERATURE: 98 F

## 2023-12-05 DIAGNOSIS — J44.9 OBSTRUCTIVE LUNG DISEASE (GENERALIZED) (H): ICD-10-CM

## 2023-12-05 DIAGNOSIS — N18.31 STAGE 3A CHRONIC KIDNEY DISEASE (H): Primary | ICD-10-CM

## 2023-12-05 DIAGNOSIS — E78.2 MIXED HYPERLIPIDEMIA: ICD-10-CM

## 2023-12-05 DIAGNOSIS — I10 ESSENTIAL HYPERTENSION: ICD-10-CM

## 2023-12-05 LAB
ANION GAP SERPL CALCULATED.3IONS-SCNC: 13 MMOL/L (ref 7–15)
BUN SERPL-MCNC: 11.6 MG/DL (ref 8–23)
CALCIUM SERPL-MCNC: 8.2 MG/DL (ref 8.8–10.2)
CHLORIDE SERPL-SCNC: 110 MMOL/L (ref 98–107)
CHOLEST SERPL-MCNC: 173 MG/DL
CREAT SERPL-MCNC: 1.33 MG/DL (ref 0.67–1.17)
DEPRECATED HCO3 PLAS-SCNC: 18 MMOL/L (ref 22–29)
EGFRCR SERPLBLD CKD-EPI 2021: 60 ML/MIN/1.73M2
FASTING STATUS PATIENT QL REPORTED: NORMAL
GLUCOSE SERPL-MCNC: 101 MG/DL (ref 70–99)
HDLC SERPL-MCNC: 78 MG/DL
HGB BLD-MCNC: 11.4 G/DL (ref 13.3–17.7)
LDLC SERPL CALC-MCNC: 77 MG/DL
NONHDLC SERPL-MCNC: 95 MG/DL
POTASSIUM SERPL-SCNC: 4.4 MMOL/L (ref 3.4–5.3)
SODIUM SERPL-SCNC: 141 MMOL/L (ref 135–145)
TRIGL SERPL-MCNC: 90 MG/DL

## 2023-12-05 PROCEDURE — 80048 BASIC METABOLIC PNL TOTAL CA: CPT

## 2023-12-05 PROCEDURE — 85018 HEMOGLOBIN: CPT

## 2023-12-05 PROCEDURE — 36415 COLL VENOUS BLD VENIPUNCTURE: CPT

## 2023-12-05 PROCEDURE — 80061 LIPID PANEL: CPT

## 2023-12-05 PROCEDURE — 99214 OFFICE O/P EST MOD 30 MIN: CPT | Mod: GC

## 2023-12-05 RX ORDER — AMLODIPINE BESYLATE 10 MG/1
10 TABLET ORAL DAILY
Qty: 90 TABLET | Refills: 1 | Status: SHIPPED | OUTPATIENT
Start: 2023-12-05 | End: 2024-03-29

## 2023-12-05 RX ORDER — CARVEDILOL 3.12 MG/1
3.12 TABLET ORAL 2 TIMES DAILY WITH MEALS
Qty: 60 TABLET | Refills: 3 | Status: SHIPPED | OUTPATIENT
Start: 2023-12-05 | End: 2024-03-07

## 2023-12-05 RX ORDER — LOSARTAN POTASSIUM 50 MG/1
50 TABLET ORAL DAILY
Qty: 90 TABLET | Refills: 0 | Status: SHIPPED | OUTPATIENT
Start: 2023-12-05 | End: 2024-03-07

## 2023-12-05 RX ORDER — IPRATROPIUM BROMIDE AND ALBUTEROL SULFATE 2.5; .5 MG/3ML; MG/3ML
1 SOLUTION RESPIRATORY (INHALATION) EVERY 6 HOURS PRN
Qty: 90 ML | Refills: 1 | Status: SHIPPED | OUTPATIENT
Start: 2023-12-05 | End: 2024-03-29

## 2023-12-05 NOTE — PROGRESS NOTES
Assessment & Plan     Stage 3a chronic kidney disease (H)  Used to follow with nephrology, lost to follow up. Last BMP in Sept through [facility] showed Cr 1.5, bicarb 17. Will need to repeat BMP today and referral back to nephrology placed.    - Adult Nephrology  Referral  - Hemoglobin recommended per care gaps  - BASIC METABOLIC PANEL    Mixed hyperlipidemia  Due for updated labs. Unclear if he is taking statin currently.   - Lipid panel reflex to direct LDL Non-fasting    Essential hypertension  /92 today. Not at goal. He is out of all of his BP medications as below. He is asymptomatic. Will need to repeat BMP prior to starting as last BMP in Sept showed K 5.4 (hemolyzed). Plan to repeat BMP in 4 weeks as well after restarting losartan  - amLODIPine (NORVASC) 10 MG tablet  Dispense: 90 tablet; Refill: 1  - carvedilol (COREG) 3.125 MG tablet  Dispense: 60 tablet; Refill: 3  - losartan (COZAAR) 50 MG tablet  Dispense: 90 tablet; Refill: 0  - BMP today and in 4 weeks     Obstructive lung disease (generalized) (H)  Completed form for [employer], patient needs electricity on for nebulizer machine. He has a hx of asthma but also has a smoking history. Needs PFTs, states he will return for this. No signs of exacerbation on exam and oxygen 100% today. Recommend using duonebs twice daily, likely will be more effective than albuterol only inhaler. Will need maintenance inhaler once PFTs completed.   - ipratropium - albuterol 0.5 mg/2.5 mg/3 mL (DUONEB) 0.5-2.5 (3) MG/3ML neb solution  Dispense: 90 mL; Refill: 1  - PFTs next visit         Nicotine/Tobacco Cessation:  He reports that he has been smoking cigarettes. He has been smoking an average of .5 packs per day. He has never used smokeless tobacco.  Nicotine/Tobacco Cessation Plan:   Information offered: Patient not interested at this time        Return in about 2 weeks (around 12/19/2023).    Petra Chavis MD PGY3  [facility]  "Residency  12/05/23    I precepted today with Dr. Slater.      Nelson Green is a 63 year old, presenting for the following health issues:  Recheck Medication, Hypertension, Forms (Medically necessary equipment and emergency cert. form), and Medication Reconciliation (Unable to review, per pt not taking any med at moment)      12/5/2023    10:26 AM   Additional Questions   Roomed by Suri   Accompanied by patient and family       HPI     Needs form completed for [employer]. Patient needs electricity on for nebulizer machine. He has a hx of asthma but also has a smoking history. He uses either duonebs or albuterol inhaler twice a day. No cough or sputum. No hx of PFTs.     Used to follow with nephrology, lost to follow up. He is out of his amlodipine 10, losartan 50 and carvedilol. Asymptomatic including no chest pain.Does not monitor BP at home. Agreeable to labs today.      Review of Systems   Constitutional, HEENT, cardiovascular, pulmonary, gi and gu systems are negative, except as otherwise noted.      Objective    BP (!) 156/92 (BP Location: Right arm, Patient Position: Sitting, Cuff Size: Adult Regular)   Pulse 97   Temp 98  F (36.7  C) (Oral)   Resp 20   Ht 1.778 m (5' 10\")   Wt 52.6 kg (116 lb)   SpO2 100%   BMI 16.64 kg/m    Body mass index is 16.64 kg/m .  Physical Exam   GENERAL: alert and no distress  NECK: supple, no asymmetry  RESP: lungs clear to auscultation - no rales, rhonchi or wheezes  CV: regular rate and rhythm, normal S1 S2, no S3 or S4, no murmur  MS: paralysis of LUE (chronic/baseline)  PSYCH: mentation appears normal, affect normal/bright            "

## 2023-12-05 NOTE — PATIENT INSTRUCTIONS
Use duonebs twice a day   Follow up in 2-3 weeks for lung/breathing testing    Start taking your blood pressure medications    We will let you know the results of the blood work

## 2023-12-12 ENCOUNTER — TRANSFERRED RECORDS (OUTPATIENT)
Dept: HEALTH INFORMATION MANAGEMENT | Facility: CLINIC | Age: 63
End: 2023-12-12
Payer: COMMERCIAL

## 2023-12-14 NOTE — CONFIDENTIAL NOTE
DIAGNOSIS   Stage 3a chronic kidney disease    DATE RECEIVED:  02.07.2024   NOTES STATUS DETAILS   OFFICE NOTE from referring provider Internal 12.05.2023 Lore Chvais MD    OFFICE NOTE from other specialist  Internal    Care Everywhere 08.25.2023  Terence Martines DO       07.01.2021 Shailesh Ramirez DO [facility]    *Only VASCULITIS or LUPUS gather office notes for the following     *PULMONARY       *ENT     *DERMATOLOGY     *RHEUMATOLOGY     DISCHARGE SUMMARY from hospital     DISCHARGE REPORT from the ER     MEDICATION LIST Internal    IMAGING  (NEED IMAGES AND REPORTS)     KIDNEY CT SCAN     KIDNEY ULTRASOUND     MR ABDOMEN     NUCLEAR MEDICINE RENAL     LABS     CBC Care Everywhere 09.06.2023   CMP Care Everywhere 09.06.2023   BMP Internal 04.09.2021   UA Care Everywhere 04.09.2021   URINE PROTEIN Care Everywhere 04.09.2021   RENAL PANEL Care Everywhere 04.09.2021   BIOPSY     KIDNEY BIOPSY

## 2023-12-15 ENCOUNTER — TELEPHONE (OUTPATIENT)
Dept: FAMILY MEDICINE | Facility: CLINIC | Age: 63
End: 2023-12-15
Payer: COMMERCIAL

## 2023-12-15 NOTE — TELEPHONE ENCOUNTER
Call placed back to family to discuss need to be seen in the clinic for the form change. They state they need it as soon as possible and cannot come to the clinic today. They cannot wait until Monday afternoon when Dr. Martines is in the clinic again. MIKE Norwood

## 2023-12-15 NOTE — TELEPHONE ENCOUNTER
[facility] phone call message- general phone call:    Reason for call: She needs a call back re  FMLA paperwork it needs to state that   she is able attend all of his appointments with him. He no longer is doing the amino therapy but he still has cat scans, dental , therapy things like that that she needs to be there with him for. she needs this added    Action desired: call back.    Return call needed: Yes    OK to leave a message on voice mail? Yes    Advised patient to response may take up to 2 business days: Yes    Primary language: English      needed? No    Call taken on December 15, 2023 at 1:16 PM by Raffi Pacheco

## 2023-12-15 NOTE — TELEPHONE ENCOUNTER
Call placed back to family member. She states due to the iron infusions, dental needs, and oncology appointments as well as others the recent FMLA form from 11/1 (Located in the Media Tab) is not sufficient as it lists only one medical appointment per month while the patient is having 5-6 where his daughter is assisting. They are wondering if we can change this form and refax it over. Routed to Dr. Martines. MIKE Norwood

## 2023-12-18 ENCOUNTER — OFFICE VISIT (OUTPATIENT)
Dept: FAMILY MEDICINE | Facility: CLINIC | Age: 63
End: 2023-12-18
Payer: COMMERCIAL

## 2023-12-18 VITALS
DIASTOLIC BLOOD PRESSURE: 93 MMHG | BODY MASS INDEX: 16.44 KG/M2 | RESPIRATION RATE: 18 BRPM | HEART RATE: 51 BPM | WEIGHT: 114.6 LBS | TEMPERATURE: 98.9 F | OXYGEN SATURATION: 100 % | SYSTOLIC BLOOD PRESSURE: 160 MMHG

## 2023-12-18 DIAGNOSIS — Z02.89 ENCOUNTER FOR COMPLETION OF FORM WITH PATIENT: Primary | ICD-10-CM

## 2023-12-18 DIAGNOSIS — I10 PRIMARY HYPERTENSION: ICD-10-CM

## 2023-12-18 PROCEDURE — 99213 OFFICE O/P EST LOW 20 MIN: CPT | Mod: GC

## 2023-12-18 NOTE — PATIENT INSTRUCTIONS
Thank you for coming into clinic today!    Make sure you are taking your mediations as prescribed  Schedule appointment at  for 2-4 weeks for BP check and repeat labs  [facility] to receive results, view appointments, and communicate with your healthcare team  Call [facility] at [phone] with questions or concerns    Take care,  Snehal Fox MD
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
## 2023-12-18 NOTE — PROGRESS NOTES
Preceptor attestation:  Vital signs reviewed: BP (!) 160/93 (BP Location: Right arm, Patient Position: Sitting, Cuff Size: Adult Regular)   Pulse 51   Temp 98.9  F (37.2  C) (Tympanic)   Resp 18   Wt 52 kg (114 lb 9.6 oz)   SpO2 100%   BMI 16.44 kg/m      Patient seen, evaluated, and discussed with the resident.  I verified the content of the note, which accurately reflects my assessment of the patient and the plan of care.    Supervising physician: Charisse Smith MD  [facility]

## 2023-12-18 NOTE — LETTER
December 18, 2023      Peter Perez  [address]   [place] [address]              To whom it concerns,    [facility] certification of healthcare provider for the serious health condition form from 11/1/2023 was edited by myself on 12/18/2023 to accurately reflect the employee's needs.  Please see edited form attached with my corrections to medical visits/treatment times per month needed and reduce schedule need until 3/31/2023.  Also note addition of metastatic adenocarcinoma and humerus fracture added to relevant medical facts.    Please contact our office with any questions or concerns at [phone].      Sincerely,      Snehal Fox MD

## 2024-01-01 ENCOUNTER — DOCUMENTATION ONLY (OUTPATIENT)
Dept: FAMILY MEDICINE | Facility: CLINIC | Age: 64
End: 2024-01-01

## 2024-01-17 ENCOUNTER — TRANSFERRED RECORDS (OUTPATIENT)
Dept: HEALTH INFORMATION MANAGEMENT | Facility: CLINIC | Age: 64
End: 2024-01-17
Payer: COMMERCIAL

## 2024-01-23 ENCOUNTER — TELEPHONE (OUTPATIENT)
Dept: NEPHROLOGY | Facility: CLINIC | Age: 64
End: 2024-01-23
Payer: COMMERCIAL

## 2024-01-23 NOTE — TELEPHONE ENCOUNTER
Called patient with a appointment reminder for Wed. 2/7/24 @ 1:00 pm with Dr. Griggs and a lab draw @ 12:00 pm    Oliver Putnam on 1/23/2024 at 2:55 PM

## 2024-01-30 ENCOUNTER — DOCUMENTATION ONLY (OUTPATIENT)
Dept: NEPHROLOGY | Facility: CLINIC | Age: 64
End: 2024-01-30
Payer: COMMERCIAL

## 2024-01-30 NOTE — PROGRESS NOTES
Was scrubbing chart for upcoming New patient appointment with Dr. Griggs and noted that Peter is already seeing Dr. Shailesh Ramirez with [facility] (saw on 1/17/24). Writer spoke to Peter's daughter, Jackelyn regarding this. She reports she did not realize he was seeing two different nephrologists and will stay with Dr. Ramirez. Writer did cancel upcoming appointment.

## 2024-02-05 ENCOUNTER — OFFICE VISIT (OUTPATIENT)
Dept: FAMILY MEDICINE | Facility: CLINIC | Age: 64
End: 2024-02-05
Payer: COMMERCIAL

## 2024-02-05 VITALS
WEIGHT: 112.8 LBS | TEMPERATURE: 97.9 F | BODY MASS INDEX: 16.19 KG/M2 | HEART RATE: 93 BPM | SYSTOLIC BLOOD PRESSURE: 128 MMHG | RESPIRATION RATE: 20 BRPM | OXYGEN SATURATION: 98 % | DIASTOLIC BLOOD PRESSURE: 82 MMHG

## 2024-02-05 DIAGNOSIS — C79.51 ADENOCARCINOMA METASTATIC TO RIGHT FEMUR (H): ICD-10-CM

## 2024-02-05 DIAGNOSIS — F10.90 ALCOHOL USE DISORDER: ICD-10-CM

## 2024-02-05 DIAGNOSIS — N18.31 STAGE 3A CHRONIC KIDNEY DISEASE (H): ICD-10-CM

## 2024-02-05 DIAGNOSIS — R41.0 DISORIENTATION: Primary | ICD-10-CM

## 2024-02-05 PROCEDURE — 99214 OFFICE O/P EST MOD 30 MIN: CPT | Mod: GC
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
## 2024-02-06 ENCOUNTER — OFFICE VISIT (OUTPATIENT)
Dept: PEDIATRICS | Facility: CLINIC | Age: 64
End: 2024-02-06
Payer: COMMERCIAL

## 2024-02-06 ENCOUNTER — HOSPITAL ENCOUNTER (OUTPATIENT)
Dept: MRI IMAGING | Facility: HOSPITAL | Age: 64
Discharge: HOME | End: 2024-02-06
Attending: EMERGENCY MEDICINE
Payer: COMMERCIAL

## 2024-02-06 ENCOUNTER — HOSPITAL ENCOUNTER (OUTPATIENT)
Dept: ULTRASOUND IMAGING | Facility: HOSPITAL | Age: 64
Discharge: HOME | End: 2024-02-06
Attending: EMERGENCY MEDICINE
Payer: COMMERCIAL

## 2024-02-06 ENCOUNTER — HOSPITAL ENCOUNTER (OUTPATIENT)
Dept: GENERAL RADIOLOGY | Facility: HOSPITAL | Age: 64
Discharge: HOME | End: 2024-02-06
Attending: EMERGENCY MEDICINE
Payer: COMMERCIAL

## 2024-02-06 VITALS
RESPIRATION RATE: 20 BRPM | TEMPERATURE: 97.6 F | HEART RATE: 76 BPM | DIASTOLIC BLOOD PRESSURE: 61 MMHG | SYSTOLIC BLOOD PRESSURE: 138 MMHG | OXYGEN SATURATION: 98 %

## 2024-02-06 DIAGNOSIS — R05.1 ACUTE COUGH: ICD-10-CM

## 2024-02-06 DIAGNOSIS — R41.82 ALTERED MENTAL STATUS, UNSPECIFIED ALTERED MENTAL STATUS TYPE: ICD-10-CM

## 2024-02-06 DIAGNOSIS — I63.30 CEREBROVASCULAR ACCIDENT (CVA) DUE TO THROMBOSIS OF CEREBRAL ARTERY (H): Primary | ICD-10-CM

## 2024-02-06 LAB
ALBUMIN SERPL BCG-MCNC: 3 G/DL (ref 3.5–5.2)
ALP SERPL-CCNC: 102 U/L (ref 40–150)
ALT SERPL W P-5'-P-CCNC: <5 U/L (ref 0–70)
ANION GAP SERPL CALCULATED.3IONS-SCNC: 10 MMOL/L (ref 7–15)
AST SERPL W P-5'-P-CCNC: 16 U/L (ref 0–45)
BASOPHILS # BLD AUTO: 0 10E3/UL (ref 0–0.2)
BASOPHILS NFR BLD AUTO: 0 %
BILIRUB SERPL-MCNC: 0.2 MG/DL
BUN SERPL-MCNC: 15.5 MG/DL (ref 8–23)
CALCIUM SERPL-MCNC: 8.6 MG/DL (ref 8.8–10.2)
CHLORIDE SERPL-SCNC: 108 MMOL/L (ref 98–107)
CREAT SERPL-MCNC: 1.25 MG/DL (ref 0.67–1.17)
DEPRECATED HCO3 PLAS-SCNC: 19 MMOL/L (ref 22–29)
EGFRCR SERPLBLD CKD-EPI 2021: 64 ML/MIN/1.73M2
EOSINOPHIL # BLD AUTO: 0.9 10E3/UL (ref 0–0.7)
EOSINOPHIL NFR BLD AUTO: 10 %
ERYTHROCYTE [DISTWIDTH] IN BLOOD BY AUTOMATED COUNT: 16.7 % (ref 10–15)
ETHANOL SERPL-MCNC: <0.01 G/DL
FLUAV RNA SPEC QL NAA+PROBE: NEGATIVE
FLUBV RNA RESP QL NAA+PROBE: NEGATIVE
GLUCOSE SERPL-MCNC: 95 MG/DL (ref 70–99)
HCT VFR BLD AUTO: 41.2 % (ref 40–53)
HGB BLD-MCNC: 12.5 G/DL (ref 13.3–17.7)
IMM GRANULOCYTES # BLD: 0.1 10E3/UL
IMM GRANULOCYTES NFR BLD: 1 %
LYMPHOCYTES # BLD AUTO: 2 10E3/UL (ref 0.8–5.3)
LYMPHOCYTES NFR BLD AUTO: 22 %
MCH RBC QN AUTO: 30 PG (ref 26.5–33)
MCHC RBC AUTO-ENTMCNC: 30.3 G/DL (ref 31.5–36.5)
MCV RBC AUTO: 99 FL (ref 78–100)
MONOCYTES # BLD AUTO: 1.1 10E3/UL (ref 0–1.3)
MONOCYTES NFR BLD AUTO: 12 %
NEUTROPHILS # BLD AUTO: 5.1 10E3/UL (ref 1.6–8.3)
NEUTROPHILS NFR BLD AUTO: 56 %
PLATELET # BLD AUTO: 379 10E3/UL (ref 150–450)
POTASSIUM SERPL-SCNC: 5.5 MMOL/L (ref 3.4–5.3)
PROT SERPL-MCNC: 6.9 G/DL (ref 6.4–8.3)
RBC # BLD AUTO: 4.16 10E6/UL (ref 4.4–5.9)
RSV RNA SPEC NAA+PROBE: NEGATIVE
SARS-COV-2 RNA RESP QL NAA+PROBE: NEGATIVE
SODIUM SERPL-SCNC: 137 MMOL/L (ref 135–145)
WBC # BLD AUTO: 9.1 10E3/UL (ref 4–11)

## 2024-02-06 PROCEDURE — 70551 MRI BRAIN STEM W/O DYE: CPT

## 2024-02-06 PROCEDURE — 82077 ASSAY SPEC XCP UR&BREATH IA: CPT | Performed by: EMERGENCY MEDICINE

## 2024-02-06 PROCEDURE — 93880 EXTRACRANIAL BILAT STUDY: CPT

## 2024-02-06 PROCEDURE — 87637 SARSCOV2&INF A&B&RSV AMP PRB: CPT | Performed by: EMERGENCY MEDICINE

## 2024-02-06 PROCEDURE — 36415 COLL VENOUS BLD VENIPUNCTURE: CPT | Performed by: EMERGENCY MEDICINE

## 2024-02-06 PROCEDURE — 71046 X-RAY EXAM CHEST 2 VIEWS: CPT

## 2024-02-06 PROCEDURE — 93000 ELECTROCARDIOGRAM COMPLETE: CPT | Performed by: EMERGENCY MEDICINE

## 2024-02-06 PROCEDURE — 80053 COMPREHEN METABOLIC PANEL: CPT | Performed by: EMERGENCY MEDICINE

## 2024-02-06 PROCEDURE — 99214 OFFICE O/P EST MOD 30 MIN: CPT | Mod: 25 | Performed by: EMERGENCY MEDICINE

## 2024-02-06 PROCEDURE — 85025 COMPLETE CBC W/AUTO DIFF WBC: CPT | Performed by: EMERGENCY MEDICINE

## 2024-02-06 RX ORDER — ASPIRIN 81 MG/1
81 TABLET, CHEWABLE ORAL ONCE
Status: COMPLETED | OUTPATIENT
Start: 2024-02-06 | End: 2024-02-06

## 2024-02-06 RX ORDER — ASPIRIN 81 MG/1
81 TABLET ORAL DAILY
Qty: 60 TABLET | Refills: 0 | Status: SHIPPED | OUTPATIENT
Start: 2024-02-06 | End: 2024-04-06

## 2024-02-06 RX ORDER — CLOPIDOGREL BISULFATE 75 MG/1
75 TABLET ORAL DAILY
Qty: 60 TABLET | Refills: 0 | Status: SHIPPED | OUTPATIENT
Start: 2024-02-06 | End: 2024-03-29

## 2024-02-06 RX ADMIN — ASPIRIN 81 MG: 81 TABLET, CHEWABLE ORAL at 17:42

## 2024-02-06 ASSESSMENT — PAIN SCALES - GENERAL: PAINLEVEL: NO PAIN (0)

## 2024-02-06 NOTE — PATIENT INSTRUCTIONS
Your MRI reveals a new (subacute) stroke in the right frontal lobe    Please fill your prescriptions and take as directed    A neurology consult will be placed in the computer system for you to be seen by a neurologist.  They should call you in the next 48 hours to help you set up an appointment to be seen in the near future.  If they do not call you please call them at [phone].    A social work consult has also been placed in the computer system.  They should call you to help arrange evaluation for assisted living placement.

## 2024-02-06 NOTE — PROGRESS NOTES
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       Final diagnoses:   Cerebrovascular accident (CVA) due to thrombosis of cerebral artery (H)   Acute cough     Your MRI reveals a new (subacute) stroke in the right frontal lobe    Please fill your prescriptions and take as directed    A neurology consult will be placed in the computer system for you to be seen by a neurologist.  They should call you in the next 48 hours to help you set up an appointment to be seen in the near future.  If they do not call you please call them at [phone].    A social work consult has also been placed in the computer system.  They should call you to help arrange evaluation for assisted living placement.    Routine discharge instructions were given for the above diagnosis.    Patient to go home with his daughter    SONIDO GUARDADO MD    2/6/2024   [facility]

## 2024-02-07 ENCOUNTER — TELEPHONE (OUTPATIENT)
Dept: FAMILY MEDICINE | Facility: CLINIC | Age: 64
End: 2024-02-07

## 2024-02-07 ENCOUNTER — PRE VISIT (OUTPATIENT)
Dept: NEPHROLOGY | Facility: CLINIC | Age: 64
End: 2024-02-07

## 2024-02-07 ENCOUNTER — CARE COORDINATION (OUTPATIENT)
Dept: FAMILY MEDICINE | Facility: CLINIC | Age: 64
End: 2024-02-07

## 2024-02-07 DIAGNOSIS — Z71.89 COUNSELING AND COORDINATION OF CARE: Primary | ICD-10-CM

## 2024-02-07 LAB
ATRIAL RATE - MUSE: 68 BPM
DIASTOLIC BLOOD PRESSURE - MUSE: NORMAL MMHG
INTERPRETATION ECG - MUSE: NORMAL
P AXIS - MUSE: 74 DEGREES
PR INTERVAL - MUSE: 124 MS
QRS DURATION - MUSE: 68 MS
QT - MUSE: 436 MS
QTC - MUSE: 463 MS
R AXIS - MUSE: -28 DEGREES
SYSTOLIC BLOOD PRESSURE - MUSE: NORMAL MMHG
T AXIS - MUSE: 51 DEGREES
VENTRICULAR RATE- MUSE: 68 BPM

## 2024-02-07 PROCEDURE — 93010 ELECTROCARDIOGRAM REPORT: CPT | Performed by: INTERNAL MEDICINE

## 2024-02-07 NOTE — PROGRESS NOTES
Clinic Care Coordination - Chart Review    Specialty social work team received a Social Work Referral from Dr. Moy with [facility]; referral indicating patient in need of support for evaluation for assisted living versus current PCA.    Per chart review, patient not established with sites covered by the specialty social work team. Patient is established with [facility] for Primary Care.    Notified [facility] manager Rosibel of patient referral; agreed with plan of placing Primary Care - Care Coordination Referral in order for their social work team to outreach to patient to address needs. Notified Dr. Moy of plan via Epic message; will cancel initial Social Work Referral and place Primary Care - Care Coordination Referral.    Orion Roy, RN, BSN  Manager, Ambulatory Care Management - Specialties

## 2024-02-07 NOTE — PROGRESS NOTES
Care Coordination: 2/7/2024    The patient was presented to Care Coordination with questions / concerns regarding PCA services or Assisted Living as an option. After a discussion with the clinic , we have decided that the best way to serve this patient is to have him assessed first. Therefore, I have completed the [facility] Choice Assessment Intake.     I then placed a support call to explain the next steps. I spoke with his significant other who reported that they want PCA services , not assisted living. The significant other stated  that she will be his PCA once approved.                  Jairo Reno Sr.   Care Coordination  [address] [address]  [email or web address]   Office: [phone] Direct: [phone]  [facility]

## 2024-02-07 NOTE — TELEPHONE ENCOUNTER
He needs a shower chair and a cane RX send to [facility] fax number [phone]

## 2024-02-08 DIAGNOSIS — I69.954 FLACCID HEMIPLEGIA OF LEFT NONDOMINANT SIDE AS LATE EFFECT OF CEREBROVASCULAR DISEASE, UNSPECIFIED CEREBROVASCULAR DISEASE TYPE (H): Primary | ICD-10-CM

## 2024-02-12 NOTE — PROGRESS NOTES
Physician Attestation   I, José Luis Patel MD, saw this patient and agree with the findings and plan of care as documented in the note.      Items personally reviewed/procedural attestation: vitals. Patient referred to [facility] for expedited workup.     José Luis Patel MD

## 2024-02-14 ENCOUNTER — DOCUMENTATION ONLY (OUTPATIENT)
Dept: FAMILY MEDICINE | Facility: CLINIC | Age: 64
End: 2024-02-14
Payer: COMMERCIAL

## 2024-02-14 NOTE — CONFIDENTIAL NOTE
To be completed in Nursing note:    Please reference list for forms that require a visit for completion.  Please remind patients that providers are given 3-5 business days to complete and return forms.      Form type: Home Health Certification and Plan of Care    Date form received: 2/14/24    Date form completed by Physician: 2/19/24    How was form returned to patient (mailed, faxed, or at  for patient to ):2/19/24    Date form mailed/faxed/left at  for patient and sent to HIM for scanning:faxed      Once form is left for patient, faxed, or mailed PCS will then close the documentation only encounter.

## 2024-02-16 DIAGNOSIS — Z53.9 DIAGNOSIS NOT YET DEFINED: Primary | ICD-10-CM

## 2024-03-07 DIAGNOSIS — I10 ESSENTIAL HYPERTENSION: ICD-10-CM

## 2024-03-07 RX ORDER — CARVEDILOL 3.12 MG/1
3.12 TABLET ORAL 2 TIMES DAILY WITH MEALS
Qty: 60 TABLET | Refills: 3 | Status: SHIPPED | OUTPATIENT
Start: 2024-03-07 | End: 2024-03-29

## 2024-03-07 RX ORDER — LOSARTAN POTASSIUM 50 MG/1
50 TABLET ORAL DAILY
Qty: 90 TABLET | Refills: 0 | Status: SHIPPED | OUTPATIENT
Start: 2024-03-07 | End: 2024-03-29

## 2024-03-07 NOTE — TELEPHONE ENCOUNTER
Medication requested: CARVEDILOL 3.125MG TABLETS   Last office visit: 2/5/24  Future [facility] appointments: none  Medication last refilled: 12/5/23  Last qualifying labs: BP: 138/61  as of 2/6/2024     Prescription approved per [facility] Refill Protocol.  Beta-Blockers Protocol Qbnjqw1403/07/2024 05:15 AM   Protocol Details Blood pressure under 140/90 in past 12 months    Patient is age 6 or older    Medication is active on med list    Medication indicated for associated diagnosis    Recent (12 mo) or future (90 days) visit within the authorizing provider's specialty       Medication requested: LOSARTAN 50MG TABLETS   Medication last refilled: 12/5/23  Last qualifying labs: none    Routing refill request to provider for review/approval because:    Angiotensin-II Receptors [id number]/07/2024 05:15 AM   Protocol Details Normal serum potassium on file in past 12 months        MIKE Lewis, BSN

## 2024-03-18 ENCOUNTER — PATIENT OUTREACH (OUTPATIENT)
Dept: CARE COORDINATION | Facility: CLINIC | Age: 64
End: 2024-03-18
Payer: COMMERCIAL

## 2024-03-20 DIAGNOSIS — K02.9 CARIES: Primary | ICD-10-CM

## 2024-03-24 DIAGNOSIS — G62.9 NEUROPATHY: ICD-10-CM

## 2024-03-25 RX ORDER — GABAPENTIN 300 MG/1
300 CAPSULE ORAL 2 TIMES DAILY
Qty: 60 CAPSULE | Refills: 1 | Status: SHIPPED | OUTPATIENT
Start: 2024-03-25 | End: 2024-06-28

## 2024-03-26 ENCOUNTER — TELEPHONE (OUTPATIENT)
Dept: FAMILY MEDICINE | Facility: CLINIC | Age: 64
End: 2024-03-26

## 2024-03-26 NOTE — TELEPHONE ENCOUNTER
Date of discharge: 3/23/24  Facility of discharge: [facility]  Patient concerns about condition: Concerns include Weakness.  Patient concerns about medications: Concerns include discontinued meds at the hospital.   Patient concerns about transitioning: Pt is currently in a wheelchair due to weakness  Clinic office visit appointment date: 3/29/24 w/ Dr. Leblanc  Patient reminded to bring all medications (prescription and over-the-counter) to clinic appointment: Yes    Using the date of discharge as day 1, the 30th day post discharge is 4/20/24

## 2024-03-27 ENCOUNTER — TRANSFERRED RECORDS (OUTPATIENT)
Dept: HEALTH INFORMATION MANAGEMENT | Facility: CLINIC | Age: 64
End: 2024-03-27
Payer: COMMERCIAL

## 2024-03-28 ENCOUNTER — MEDICAL CORRESPONDENCE (OUTPATIENT)
Dept: HEALTH INFORMATION MANAGEMENT | Facility: CLINIC | Age: 64
End: 2024-03-28

## 2024-03-28 NOTE — TELEPHONE ENCOUNTER
No Show:    Spoke to pt's family regarding excessive no-shows. Reviewed No-show policy and the importance of coming to their appointments. They have voiced understanding and have agreed to call or use their MyChart to cancel their appointments. They are aware if they continue to no-show appointments, they may only be eligible for same day appointments, if available.    Was MyChart offered to patient?  No. Was not talking with patient.     Was Text Reminders offered to patient?  No. Was not speaking to patient.     Nanda Berg

## 2024-03-29 ENCOUNTER — OFFICE VISIT (OUTPATIENT)
Dept: FAMILY MEDICINE | Facility: CLINIC | Age: 64
End: 2024-03-29
Payer: COMMERCIAL

## 2024-03-29 ENCOUNTER — TELEPHONE (OUTPATIENT)
Dept: FAMILY MEDICINE | Facility: CLINIC | Age: 64
End: 2024-03-29

## 2024-03-29 ENCOUNTER — OFFICE VISIT (OUTPATIENT)
Dept: PHARMACY | Facility: CLINIC | Age: 64
End: 2024-03-29
Payer: COMMERCIAL

## 2024-03-29 VITALS
OXYGEN SATURATION: 100 % | RESPIRATION RATE: 16 BRPM | DIASTOLIC BLOOD PRESSURE: 71 MMHG | TEMPERATURE: 97.3 F | HEART RATE: 81 BPM | SYSTOLIC BLOOD PRESSURE: 114 MMHG

## 2024-03-29 DIAGNOSIS — J44.9 OBSTRUCTIVE LUNG DISEASE (GENERALIZED) (H): ICD-10-CM

## 2024-03-29 DIAGNOSIS — M79.604 PAIN OF RIGHT LOWER EXTREMITY: ICD-10-CM

## 2024-03-29 DIAGNOSIS — I63.40 CEREBRAL INFARCTION DUE TO EMBOLISM OF CEREBRAL ARTERY (H): ICD-10-CM

## 2024-03-29 DIAGNOSIS — A41.9 SEPSIS, DUE TO UNSPECIFIED ORGANISM, UNSPECIFIED WHETHER ACUTE ORGAN DYSFUNCTION PRESENT (H): Primary | ICD-10-CM

## 2024-03-29 DIAGNOSIS — I10 PRIMARY HYPERTENSION: ICD-10-CM

## 2024-03-29 DIAGNOSIS — Z09 HOSPITAL DISCHARGE FOLLOW-UP: ICD-10-CM

## 2024-03-29 DIAGNOSIS — K59.00 CONSTIPATION, UNSPECIFIED CONSTIPATION TYPE: ICD-10-CM

## 2024-03-29 DIAGNOSIS — N18.31 STAGE 3A CHRONIC KIDNEY DISEASE (H): Primary | ICD-10-CM

## 2024-03-29 DIAGNOSIS — J45.40 MODERATE PERSISTENT ASTHMA, UNSPECIFIED WHETHER COMPLICATED: ICD-10-CM

## 2024-03-29 PROCEDURE — 99214 OFFICE O/P EST MOD 30 MIN: CPT | Mod: GC

## 2024-03-29 PROCEDURE — 99207 PR NO CHARGE LOS: CPT | Performed by: PHARMACIST

## 2024-03-29 RX ORDER — ATORVASTATIN CALCIUM 40 MG/1
40 TABLET, FILM COATED ORAL DAILY
Qty: 90 TABLET | Refills: 3 | Status: SHIPPED | OUTPATIENT
Start: 2024-03-29

## 2024-03-29 RX ORDER — CYCLOBENZAPRINE HCL 10 MG
10 TABLET ORAL 3 TIMES DAILY PRN
Qty: 90 TABLET | Refills: 1 | Status: SHIPPED | OUTPATIENT
Start: 2024-03-29 | End: 2024-05-10

## 2024-03-29 RX ORDER — SENNOSIDES 8.6 MG
TABLET ORAL
Qty: 90 TABLET | Refills: 3 | Status: SHIPPED | OUTPATIENT
Start: 2024-03-29

## 2024-03-29 RX ORDER — CETIRIZINE HYDROCHLORIDE 10 MG/1
10 TABLET ORAL EVERY EVENING
Qty: 90 TABLET | Refills: 3 | Status: SHIPPED | OUTPATIENT
Start: 2024-03-29

## 2024-03-29 RX ORDER — ALBUTEROL SULFATE 90 UG/1
2 AEROSOL, METERED RESPIRATORY (INHALATION) EVERY 6 HOURS PRN
Qty: 8.5 G | Refills: 3 | Status: SHIPPED | OUTPATIENT
Start: 2024-03-29

## 2024-03-29 RX ORDER — IPRATROPIUM BROMIDE AND ALBUTEROL SULFATE 2.5; .5 MG/3ML; MG/3ML
1 SOLUTION RESPIRATORY (INHALATION) EVERY 6 HOURS PRN
Qty: 90 ML | Refills: 1 | Status: SHIPPED | OUTPATIENT
Start: 2024-03-29

## 2024-03-29 RX ORDER — FLUTICASONE PROPIONATE 50 MCG
1-2 SPRAY, SUSPENSION (ML) NASAL DAILY
Qty: 9.9 ML | Refills: 3 | Status: SHIPPED | OUTPATIENT
Start: 2024-03-29

## 2024-03-29 NOTE — PATIENT INSTRUCTIONS
Thank you for trusting us with your care.     Here's a summary of the visit today:   Start checking BP at home   Med refills sent to pharmacy   Follow up with palliative  Follow up with oncology   Follow up with PCP - 4-6 weeks or sooner if symptoms not improved             Thank you.     Dr. Leblanc

## 2024-03-29 NOTE — PROGRESS NOTES
Clinical Pharmacy Consult:                                                    Peter Perez is a 63 year old seen for a TCM medication reconciliation.  He was discharged from [place] on 3/23 for sepsis.  Patient was seen today by Dr. Dr Leblanc for transitional care management.       Reason for Consult: TCM medication reconciliation.    Discussion:   The following medications were started in the hospital:   None    The following medications were stopped in the hospital:   Amlodipine 10 mg  Carvedilol 3.125 mg twice daily  Clopidogrel 75 mg daily  Dronabinol 2.5 mg twice daily  Duloxetine 60 mg daily  Losartan 50 mg daily  Cholecalciferol    The following medications were changed in the hospital:   none    This patient's medication list has been updated to reflect their current medications.  Their medications have not been reviewed for indication, effectiveness, safety or convenience. Please see the note today from Dr. Leblanc for additional information.    Plan:  As directed by Dr. Leblanc    Post Discharge Medication Reconciliation Status: discharge medications reconciled and changed, per note/orders.

## 2024-03-29 NOTE — Clinical Note
FYI only - pharmacy department requires that I route this note to you.  We discussed in person today.  ezekiel

## 2024-03-29 NOTE — TELEPHONE ENCOUNTER
Home Care is calling regarding an established patient with [facility].       Requesting orders from: Terence Martines  Provider is following patient: Yes  Is this a 60-day recertification request?  No    Orders Requested    Skilled Nursing  Request for initial certification (first set of orders)   Frequency:  2x/wk for 2 wks and then 2 x a wk for 7 wks. Requesting 1-3 PRN visits    Physical Therapy  Request for initial evaluation and treatment (one time)     Occupational Therapy  Request for initial evaluation and treatment (one time)     Speech Therapy  Request for initial evaluation and treatment (one time)     Social Work  Request for initial certification (first set of orders)   Frequency:  community resources and long term planning    HHA (Home Health Aide)  Request for initial certification (first set of orders)   Frequency:  2x/wk for 5 wks      Confirmed ok to leave a detailed message with call back.  Contact information confirmed and updated as needed.                     When does home care need verbal order by? asap      **PROVIDERS: Please route task to Nurses, not .**
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
## 2024-04-02 ENCOUNTER — TELEPHONE (OUTPATIENT)
Dept: FAMILY MEDICINE | Facility: CLINIC | Age: 64
End: 2024-04-02
Payer: COMMERCIAL

## 2024-04-02 ENCOUNTER — DOCUMENTATION ONLY (OUTPATIENT)
Dept: OTHER | Facility: CLINIC | Age: 64
End: 2024-04-02
Payer: COMMERCIAL

## 2024-04-02 DIAGNOSIS — I69.954 FLACCID HEMIPLEGIA OF LEFT NONDOMINANT SIDE AS LATE EFFECT OF CEREBROVASCULAR DISEASE, UNSPECIFIED CEREBROVASCULAR DISEASE TYPE (H): Primary | ICD-10-CM

## 2024-04-02 NOTE — TELEPHONE ENCOUNTER
Home Care is calling regarding an established patient with [facility].       Requesting orders from:  QUEENIE  Provider is following patient: Yes  Is this a 60-day recertification request?  No    Orders Requested    Occupational Therapy  Request for initial certification (first set of orders)   Frequency: 1 MORE TIME THIS WEEK, ONCE A WEEK FOR 3 WEEKS      Information was gathered and will be sent to provider for review.  RN will contact Home Care with information after provider review.  Confirmed ok to leave a detailed message with call back.  Contact information confirmed and updated as needed.    Gabriella Lovett                When does home care need verbal order by?       **PROVIDERS: Please route task to Nurses, not .**

## 2024-04-03 ENCOUNTER — TELEPHONE (OUTPATIENT)
Dept: FAMILY MEDICINE | Facility: CLINIC | Age: 64
End: 2024-04-03

## 2024-04-03 ENCOUNTER — DOCUMENTATION ONLY (OUTPATIENT)
Dept: FAMILY MEDICINE | Facility: CLINIC | Age: 64
End: 2024-04-03
Payer: COMMERCIAL

## 2024-04-03 ENCOUNTER — TRANSFERRED RECORDS (OUTPATIENT)
Dept: HEALTH INFORMATION MANAGEMENT | Facility: CLINIC | Age: 64
End: 2024-04-03
Payer: COMMERCIAL

## 2024-04-03 DIAGNOSIS — I69.954 FLACCID HEMIPLEGIA OF LEFT NONDOMINANT SIDE AS LATE EFFECT OF CEREBROVASCULAR DISEASE, UNSPECIFIED CEREBROVASCULAR DISEASE TYPE (H): Primary | ICD-10-CM

## 2024-04-03 NOTE — PROGRESS NOTES
To be completed in Nursing note:    Please reference list for forms that require a visit for completion.  Please remind patients that providers are given 3-5 business days to complete and return forms.      Form type: [facility] Skilled Nursing    Date form received: 4/3/24    Date form completed by Physician:24    How was form returned to patient (mailed, faxed, or at  for patient to ):faxed [phone]    Date form mailed/faxed/left at  for patient and sent to HIM for scannin24    Once form is left for patient, faxed, or mailed PCS will then close the documentation only encounter.

## 2024-04-03 NOTE — TELEPHONE ENCOUNTER
Home Care is calling regarding an established patient with [facility].       Requesting orders from:  PCP  Provider is following patient: No       Orders Requested    Physical Therapy  Request for  1 more visit this week, 2 times a week for 2 week and  1 time a week for 2 weeks after that      Information was gathered and will be sent to provider for review.  RN will contact Home Care with information after provider review.  Confirmed ok to leave a detailed message with call back.  Contact information confirmed and updated as needed.    Gabriella Lovett                When does home care need verbal order by?       **PROVIDERS: Please route task to Nurses, not .**

## 2024-04-03 NOTE — TELEPHONE ENCOUNTER
Verbal orders for:  Physical Therapy  Request for  1 more visit this week, 2 times a week for 2 week and  1 time a week for 2 weeks after that    Given to Jr from [facility]. They are looking to evaluate walking and oxygen needs further.     MIKE Wilcox

## 2024-04-11 ENCOUNTER — PATIENT OUTREACH (OUTPATIENT)
Dept: CARE COORDINATION | Facility: CLINIC | Age: 64
End: 2024-04-11
Payer: COMMERCIAL
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
## 2024-04-11 NOTE — PROGRESS NOTES
Care Coordination:     DME Order Number: [id number]  Device: Bath Seat / Shower Chair   Vendor: [employer]- [phone]      Jairo Reno Sr.   Care Coordination  [address] [address]  [email or web address]   Office: [phone] Direct: [phone]  [facility]

## 2024-04-12 ENCOUNTER — OFFICE VISIT (OUTPATIENT)
Dept: FAMILY MEDICINE | Facility: CLINIC | Age: 64
End: 2024-04-12
Payer: COMMERCIAL

## 2024-04-12 ENCOUNTER — TELEPHONE (OUTPATIENT)
Dept: FAMILY MEDICINE | Facility: CLINIC | Age: 64
End: 2024-04-12

## 2024-04-12 ENCOUNTER — DOCUMENTATION ONLY (OUTPATIENT)
Dept: FAMILY MEDICINE | Facility: CLINIC | Age: 64
End: 2024-04-12

## 2024-04-12 VITALS
BODY MASS INDEX: 17.19 KG/M2 | HEART RATE: 73 BPM | RESPIRATION RATE: 20 BRPM | TEMPERATURE: 98.4 F | SYSTOLIC BLOOD PRESSURE: 148 MMHG | DIASTOLIC BLOOD PRESSURE: 83 MMHG | WEIGHT: 119.8 LBS | OXYGEN SATURATION: 99 %

## 2024-04-12 DIAGNOSIS — D64.9 ANEMIA, UNSPECIFIED TYPE: ICD-10-CM

## 2024-04-12 DIAGNOSIS — N18.31 STAGE 3A CHRONIC KIDNEY DISEASE (H): ICD-10-CM

## 2024-04-12 DIAGNOSIS — Z01.818 PREOP GENERAL PHYSICAL EXAM: ICD-10-CM

## 2024-04-12 DIAGNOSIS — D62 ANEMIA DUE TO BLOOD LOSS, ACUTE: ICD-10-CM

## 2024-04-12 DIAGNOSIS — R94.31 NONSPECIFIC ABNORMAL ELECTROCARDIOGRAM (ECG) (EKG): Primary | ICD-10-CM

## 2024-04-12 LAB
ERYTHROCYTE [DISTWIDTH] IN BLOOD BY AUTOMATED COUNT: 15.5 % (ref 10–15)
HCT VFR BLD AUTO: 29.6 % (ref 40–53)
HGB BLD-MCNC: 9.4 G/DL (ref 13.3–17.7)
MCH RBC QN AUTO: 30.1 PG (ref 26.5–33)
MCHC RBC AUTO-ENTMCNC: 31.8 G/DL (ref 31.5–36.5)
MCV RBC AUTO: 95 FL (ref 78–100)
PLATELET # BLD AUTO: 518 10E3/UL (ref 150–450)
RBC # BLD AUTO: 3.12 10E6/UL (ref 4.4–5.9)
WBC # BLD AUTO: 9.3 10E3/UL (ref 4–11)

## 2024-04-12 PROCEDURE — 99214 OFFICE O/P EST MOD 30 MIN: CPT | Mod: GC

## 2024-04-12 PROCEDURE — 80048 BASIC METABOLIC PNL TOTAL CA: CPT

## 2024-04-12 PROCEDURE — 83735 ASSAY OF MAGNESIUM: CPT

## 2024-04-12 PROCEDURE — 82728 ASSAY OF FERRITIN: CPT

## 2024-04-12 PROCEDURE — 84466 ASSAY OF TRANSFERRIN: CPT

## 2024-04-12 PROCEDURE — 85027 COMPLETE CBC AUTOMATED: CPT

## 2024-04-12 PROCEDURE — 83540 ASSAY OF IRON: CPT

## 2024-04-12 PROCEDURE — 93000 ELECTROCARDIOGRAM COMPLETE: CPT | Mod: GC

## 2024-04-12 PROCEDURE — 36415 COLL VENOUS BLD VENIPUNCTURE: CPT

## 2024-04-12 RX ORDER — MULTIVITAMIN
1 TABLET ORAL DAILY
Qty: 90 TABLET | Refills: 3 | Status: SHIPPED | OUTPATIENT
Start: 2024-04-12

## 2024-04-12 RX ORDER — MULTIVITAMIN
1 TABLET ORAL DAILY
COMMUNITY
Start: 2024-03-23

## 2024-04-12 RX ORDER — SODIUM BICARBONATE 650 MG/1
650 TABLET ORAL 2 TIMES DAILY
Qty: 60 TABLET | Refills: 3 | Status: SHIPPED | OUTPATIENT
Start: 2024-04-12 | End: 2024-08-31

## 2024-04-12 NOTE — TELEPHONE ENCOUNTER
[facility] phone call message- general phone call:    Reason for call: Requesting an order for a narrow base quad cane.  They would like to use [employer].    Return call needed: Yes    OK to leave a message on voice mail? Yes    Primary language: English      needed? No    Call taken on April 12, 2024 at 3:38 PM by Chad Barlow

## 2024-04-12 NOTE — PROGRESS NOTES
Preceptor Attestation:    I discussed the patient with the resident and evaluated the patient in person. I personally viewed the EKG and agree with the interpretation documented by the resident.  EKG shows extreme access deviation and findings noted.  There is J-point elevation.  Nothing to suggest a STEMI or significant ischemia.  This patient should get an echocardiogram given his history of previous MI and given his history of previous MI and his extreme axis deviation.     I have verified the content of the note, which accurately reflects my assessment of the patient and the plan of care.   Supervising Physician:  Jg Beal MD.

## 2024-04-12 NOTE — PROGRESS NOTES
To be completed in Nursing note:    Please reference list for forms that require a visit for completion.  Please remind patients that providers are given 3-5 business days to complete and return forms.      Form type:Home Health Certification Plan of Care    Date form received: 4/10/24    Date form completed by Physician:24    How was form returned to patient (mailed, faxed, or at  for patient to ):faxed [phone]    Date form mailed/faxed/left at  for patient and sent to HIM for [email or web address]    Once form is left for patient, faxed, or mailed PCS will then close the documentation only encounter.
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
## 2024-04-13 LAB
ANION GAP SERPL CALCULATED.3IONS-SCNC: 11 MMOL/L (ref 7–15)
BUN SERPL-MCNC: 25.5 MG/DL (ref 8–23)
CALCIUM SERPL-MCNC: 9.1 MG/DL (ref 8.8–10.2)
CHLORIDE SERPL-SCNC: 107 MMOL/L (ref 98–107)
CREAT SERPL-MCNC: 1.34 MG/DL (ref 0.67–1.17)
DEPRECATED HCO3 PLAS-SCNC: 23 MMOL/L (ref 22–29)
EGFRCR SERPLBLD CKD-EPI 2021: 59 ML/MIN/1.73M2
GLUCOSE SERPL-MCNC: 65 MG/DL (ref 70–99)
MAGNESIUM SERPL-MCNC: 1.9 MG/DL (ref 1.7–2.3)
POTASSIUM SERPL-SCNC: 3.9 MMOL/L (ref 3.4–5.3)
SODIUM SERPL-SCNC: 141 MMOL/L (ref 135–145)

## 2024-04-15 ENCOUNTER — ANESTHESIA EVENT (OUTPATIENT)
Dept: SURGERY | Facility: CLINIC | Age: 64
End: 2024-04-15
Payer: COMMERCIAL

## 2024-04-15 LAB
FERRITIN SERPL-MCNC: 143 NG/ML (ref 31–409)
IRON BINDING CAPACITY (ROCHE): 337 UG/DL (ref 240–430)
IRON SATN MFR SERPL: 9 % (ref 15–46)
IRON SERPL-MCNC: 30 UG/DL (ref 61–157)
TRANSFERRIN SERPL-MCNC: 292 MG/DL (ref 200–360)

## 2024-04-16 ENCOUNTER — MEDICAL CORRESPONDENCE (OUTPATIENT)
Dept: HEALTH INFORMATION MANAGEMENT | Facility: CLINIC | Age: 64
End: 2024-04-16

## 2024-04-16 ENCOUNTER — HOSPITAL ENCOUNTER (OUTPATIENT)
Facility: CLINIC | Age: 64
Discharge: HOME | End: 2024-04-16
Attending: DENTIST | Admitting: DENTIST
Payer: COMMERCIAL

## 2024-04-16 ENCOUNTER — ANESTHESIA (OUTPATIENT)
Dept: SURGERY | Facility: CLINIC | Age: 64
End: 2024-04-16
Payer: COMMERCIAL

## 2024-04-16 VITALS
OXYGEN SATURATION: 99 % | SYSTOLIC BLOOD PRESSURE: 147 MMHG | RESPIRATION RATE: 15 BRPM | BODY MASS INDEX: 17.74 KG/M2 | DIASTOLIC BLOOD PRESSURE: 75 MMHG | HEART RATE: 68 BPM | TEMPERATURE: 98 F | WEIGHT: 123.9 LBS | HEIGHT: 70 IN

## 2024-04-16 DIAGNOSIS — M27.2 OSTEOMYELITIS OF MANDIBLE: Primary | ICD-10-CM

## 2024-04-16 LAB
ANION GAP SERPL CALCULATED.3IONS-SCNC: 10 MMOL/L (ref 7–15)
BUN SERPL-MCNC: 31.3 MG/DL (ref 8–23)
CALCIUM SERPL-MCNC: 8.7 MG/DL (ref 8.8–10.2)
CHLORIDE SERPL-SCNC: 112 MMOL/L (ref 98–107)
CREAT SERPL-MCNC: 1.31 MG/DL (ref 0.67–1.17)
DEPRECATED HCO3 PLAS-SCNC: 18 MMOL/L (ref 22–29)
EGFRCR SERPLBLD CKD-EPI 2021: 61 ML/MIN/1.73M2
ERYTHROCYTE [DISTWIDTH] IN BLOOD BY AUTOMATED COUNT: 15.7 % (ref 10–15)
GLUCOSE BLDC GLUCOMTR-MCNC: 101 MG/DL (ref 70–99)
GLUCOSE SERPL-MCNC: 111 MG/DL (ref 70–99)
GRAM STAIN RESULT: NORMAL
GRAM STAIN RESULT: NORMAL
HCT VFR BLD AUTO: 29.1 % (ref 40–53)
HGB BLD-MCNC: 9 G/DL (ref 13.3–17.7)
MAGNESIUM SERPL-MCNC: 1.8 MG/DL (ref 1.7–2.3)
MCH RBC QN AUTO: 30.1 PG (ref 26.5–33)
MCHC RBC AUTO-ENTMCNC: 30.9 G/DL (ref 31.5–36.5)
MCV RBC AUTO: 97 FL (ref 78–100)
PLATELET # BLD AUTO: 470 10E3/UL (ref 150–450)
POTASSIUM SERPL-SCNC: 5.1 MMOL/L (ref 3.4–5.3)
RBC # BLD AUTO: 2.99 10E6/UL (ref 4.4–5.9)
SODIUM SERPL-SCNC: 140 MMOL/L (ref 135–145)
WBC # BLD AUTO: 19 10E3/UL (ref 4–11)

## 2024-04-16 PROCEDURE — 250N000011 HC RX IP 250 OP 636: Performed by: ANESTHESIOLOGY

## 2024-04-16 PROCEDURE — 250N000013 HC RX MED GY IP 250 OP 250 PS 637

## 2024-04-16 PROCEDURE — 360N000075 HC SURGERY LEVEL 2, PER MIN: Performed by: DENTIST

## 2024-04-16 PROCEDURE — 87102 FUNGUS ISOLATION CULTURE: CPT | Performed by: DENTIST

## 2024-04-16 PROCEDURE — 258N000003 HC RX IP 258 OP 636: Performed by: ANESTHESIOLOGY

## 2024-04-16 PROCEDURE — 93010 ELECTROCARDIOGRAM REPORT: CPT | Performed by: INTERNAL MEDICINE

## 2024-04-16 PROCEDURE — 710N000012 HC RECOVERY PHASE 2, PER MINUTE: Performed by: DENTIST

## 2024-04-16 PROCEDURE — 250N000009 HC RX 250: Performed by: ANESTHESIOLOGY

## 2024-04-16 PROCEDURE — 88305 TISSUE EXAM BY PATHOLOGIST: CPT | Mod: 26 | Performed by: PATHOLOGY

## 2024-04-16 PROCEDURE — 11044 DBRDMT BONE 1ST 20 SQ CM/<: CPT

## 2024-04-16 PROCEDURE — 88307 TISSUE EXAM BY PATHOLOGIST: CPT | Mod: 26 | Performed by: PATHOLOGY

## 2024-04-16 PROCEDURE — 250N000009 HC RX 250: Performed by: DENTIST

## 2024-04-16 PROCEDURE — 87205 SMEAR GRAM STAIN: CPT | Performed by: DENTIST

## 2024-04-16 PROCEDURE — 80048 BASIC METABOLIC PNL TOTAL CA: CPT

## 2024-04-16 PROCEDURE — 87075 CULTR BACTERIA EXCEPT BLOOD: CPT | Performed by: DENTIST

## 2024-04-16 PROCEDURE — 710N000010 HC RECOVERY PHASE 1, LEVEL 2, PER MIN: Performed by: DENTIST

## 2024-04-16 PROCEDURE — 11044 DBRDMT BONE 1ST 20 SQ CM/<: CPT | Performed by: ANESTHESIOLOGY

## 2024-04-16 PROCEDURE — 36415 COLL VENOUS BLD VENIPUNCTURE: CPT

## 2024-04-16 PROCEDURE — 370N000017 HC ANESTHESIA TECHNICAL FEE, PER MIN: Performed by: DENTIST

## 2024-04-16 PROCEDURE — C1713 ANCHOR/SCREW BN/BN,TIS/BN: HCPCS | Performed by: DENTIST

## 2024-04-16 PROCEDURE — 87070 CULTURE OTHR SPECIMN AEROBIC: CPT | Performed by: DENTIST

## 2024-04-16 PROCEDURE — 250N000024 HC ISOFLURANE, PER MIN: Performed by: DENTIST

## 2024-04-16 PROCEDURE — 88311 DECALCIFY TISSUE: CPT | Mod: 26 | Performed by: PATHOLOGY

## 2024-04-16 PROCEDURE — 88311 DECALCIFY TISSUE: CPT | Mod: TC | Performed by: DENTIST

## 2024-04-16 PROCEDURE — 999N000141 HC STATISTIC PRE-PROCEDURE NURSING ASSESSMENT: Performed by: DENTIST

## 2024-04-16 PROCEDURE — 93005 ELECTROCARDIOGRAM TRACING: CPT | Mod: 59

## 2024-04-16 PROCEDURE — 83735 ASSAY OF MAGNESIUM: CPT

## 2024-04-16 PROCEDURE — 82962 GLUCOSE BLOOD TEST: CPT

## 2024-04-16 PROCEDURE — 85027 COMPLETE CBC AUTOMATED: CPT

## 2024-04-16 PROCEDURE — 272N000001 HC OR GENERAL SUPPLY STERILE: Performed by: DENTIST

## 2024-04-16 PROCEDURE — 250N000011 HC RX IP 250 OP 636

## 2024-04-16 DEVICE — IMPLANTABLE DEVICE: Type: IMPLANTABLE DEVICE | Site: MANDIBLE | Status: FUNCTIONAL

## 2024-04-16 DEVICE — IMP SCR SYN MATRIX 2.0X10MM SELF TAP LOCKING  04.503.610.01: Type: IMPLANTABLE DEVICE | Site: MANDIBLE | Status: FUNCTIONAL

## 2024-04-16 DEVICE — IMP SCR SYN MATRIX 2.0X10MM SELF TAP  04.503.410.01: Type: IMPLANTABLE DEVICE | Site: MANDIBLE | Status: FUNCTIONAL

## 2024-04-16 RX ORDER — ONDANSETRON 2 MG/ML
4 INJECTION INTRAMUSCULAR; INTRAVENOUS EVERY 30 MIN PRN
Status: DISCONTINUED | OUTPATIENT
Start: 2024-04-16 | End: 2024-04-16 | Stop reason: HOSPADM

## 2024-04-16 RX ORDER — CEFAZOLIN SODIUM/WATER 2 G/20 ML
2 SYRINGE (ML) INTRAVENOUS SEE ADMIN INSTRUCTIONS
Status: DISCONTINUED | OUTPATIENT
Start: 2024-04-16 | End: 2024-04-16 | Stop reason: HOSPADM

## 2024-04-16 RX ORDER — BUPIVACAINE HYDROCHLORIDE AND EPINEPHRINE 2.5; 5 MG/ML; UG/ML
INJECTION, SOLUTION INFILTRATION; PERINEURAL PRN
Status: DISCONTINUED | OUTPATIENT
Start: 2024-04-16 | End: 2024-04-16 | Stop reason: HOSPADM

## 2024-04-16 RX ORDER — ONDANSETRON 4 MG/1
4 TABLET, ORALLY DISINTEGRATING ORAL EVERY 30 MIN PRN
Status: DISCONTINUED | OUTPATIENT
Start: 2024-04-16 | End: 2024-04-16 | Stop reason: HOSPADM

## 2024-04-16 RX ORDER — OXYCODONE HYDROCHLORIDE 10 MG/1
10 TABLET ORAL
Status: DISCONTINUED | OUTPATIENT
Start: 2024-04-16 | End: 2024-04-16 | Stop reason: HOSPADM

## 2024-04-16 RX ORDER — CHLORHEXIDINE GLUCONATE ORAL RINSE 1.2 MG/ML
10 SOLUTION DENTAL ONCE
Status: COMPLETED | OUTPATIENT
Start: 2024-04-16 | End: 2024-04-16

## 2024-04-16 RX ORDER — FENTANYL CITRATE 50 UG/ML
50 INJECTION, SOLUTION INTRAMUSCULAR; INTRAVENOUS EVERY 5 MIN PRN
Status: DISCONTINUED | OUTPATIENT
Start: 2024-04-16 | End: 2024-04-16 | Stop reason: HOSPADM

## 2024-04-16 RX ORDER — FENTANYL CITRATE 50 UG/ML
INJECTION, SOLUTION INTRAMUSCULAR; INTRAVENOUS PRN
Status: DISCONTINUED | OUTPATIENT
Start: 2024-04-16 | End: 2024-04-16

## 2024-04-16 RX ORDER — FENTANYL CITRATE 50 UG/ML
25 INJECTION, SOLUTION INTRAMUSCULAR; INTRAVENOUS EVERY 5 MIN PRN
Status: DISCONTINUED | OUTPATIENT
Start: 2024-04-16 | End: 2024-04-16 | Stop reason: HOSPADM

## 2024-04-16 RX ORDER — IBUPROFEN 600 MG/1
600 TABLET, FILM COATED ORAL EVERY 6 HOURS PRN
Qty: 28 TABLET | Refills: 0 | Status: SHIPPED | OUTPATIENT
Start: 2024-04-16 | End: 2024-05-10

## 2024-04-16 RX ORDER — BACITRACIN ZINC 500 [USP'U]/G
OINTMENT TOPICAL 2 TIMES DAILY
Qty: 28.4 G | Refills: 0 | Status: SHIPPED | OUTPATIENT
Start: 2024-04-16

## 2024-04-16 RX ORDER — ACETAMINOPHEN 325 MG/1
975 TABLET ORAL ONCE
Status: COMPLETED | OUTPATIENT
Start: 2024-04-16 | End: 2024-04-16

## 2024-04-16 RX ORDER — NALOXONE HYDROCHLORIDE 0.4 MG/ML
0.1 INJECTION, SOLUTION INTRAMUSCULAR; INTRAVENOUS; SUBCUTANEOUS
Status: DISCONTINUED | OUTPATIENT
Start: 2024-04-16 | End: 2024-04-16 | Stop reason: HOSPADM

## 2024-04-16 RX ORDER — HYDROMORPHONE HCL IN WATER/PF 6 MG/30 ML
0.2 PATIENT CONTROLLED ANALGESIA SYRINGE INTRAVENOUS EVERY 5 MIN PRN
Status: DISCONTINUED | OUTPATIENT
Start: 2024-04-16 | End: 2024-04-16 | Stop reason: HOSPADM

## 2024-04-16 RX ORDER — HYDROMORPHONE HCL IN WATER/PF 6 MG/30 ML
0.4 PATIENT CONTROLLED ANALGESIA SYRINGE INTRAVENOUS EVERY 5 MIN PRN
Status: DISCONTINUED | OUTPATIENT
Start: 2024-04-16 | End: 2024-04-16 | Stop reason: HOSPADM

## 2024-04-16 RX ORDER — LIDOCAINE HYDROCHLORIDE 20 MG/ML
INJECTION, SOLUTION INFILTRATION; PERINEURAL PRN
Status: DISCONTINUED | OUTPATIENT
Start: 2024-04-16 | End: 2024-04-16

## 2024-04-16 RX ORDER — LIDOCAINE 40 MG/G
CREAM TOPICAL
Status: CANCELLED | OUTPATIENT
Start: 2024-04-16

## 2024-04-16 RX ORDER — CEFAZOLIN SODIUM/WATER 2 G/20 ML
2 SYRINGE (ML) INTRAVENOUS
Status: COMPLETED | OUTPATIENT
Start: 2024-04-16 | End: 2024-04-16

## 2024-04-16 RX ORDER — SODIUM CHLORIDE, SODIUM LACTATE, POTASSIUM CHLORIDE, CALCIUM CHLORIDE 600; 310; 30; 20 MG/100ML; MG/100ML; MG/100ML; MG/100ML
INJECTION, SOLUTION INTRAVENOUS CONTINUOUS PRN
Status: DISCONTINUED | OUTPATIENT
Start: 2024-04-16 | End: 2024-04-16

## 2024-04-16 RX ORDER — DEXAMETHASONE SODIUM PHOSPHATE 4 MG/ML
INJECTION, SOLUTION INTRA-ARTICULAR; INTRALESIONAL; INTRAMUSCULAR; INTRAVENOUS; SOFT TISSUE PRN
Status: DISCONTINUED | OUTPATIENT
Start: 2024-04-16 | End: 2024-04-16

## 2024-04-16 RX ORDER — OXYCODONE HYDROCHLORIDE 5 MG/1
5 TABLET ORAL
Status: COMPLETED | OUTPATIENT
Start: 2024-04-16 | End: 2024-04-16

## 2024-04-16 RX ORDER — OXYCODONE HYDROCHLORIDE 5 MG/1
5 TABLET ORAL EVERY 6 HOURS PRN
Qty: 16 TABLET | Refills: 0 | Status: SHIPPED | OUTPATIENT
Start: 2024-04-16 | End: 2024-04-20

## 2024-04-16 RX ORDER — SODIUM CHLORIDE, SODIUM LACTATE, POTASSIUM CHLORIDE, CALCIUM CHLORIDE 600; 310; 30; 20 MG/100ML; MG/100ML; MG/100ML; MG/100ML
INJECTION, SOLUTION INTRAVENOUS CONTINUOUS
Status: DISCONTINUED | OUTPATIENT
Start: 2024-04-16 | End: 2024-04-16 | Stop reason: HOSPADM

## 2024-04-16 RX ORDER — ACETAMINOPHEN 500 MG
500 TABLET ORAL EVERY 6 HOURS PRN
Qty: 28 TABLET | Refills: 0 | Status: SHIPPED | OUTPATIENT
Start: 2024-04-16

## 2024-04-16 RX ORDER — CHLORHEXIDINE GLUCONATE ORAL RINSE 1.2 MG/ML
15 SOLUTION DENTAL 2 TIMES DAILY
Qty: 473 ML | Refills: 0 | Status: SHIPPED | OUTPATIENT
Start: 2024-04-16

## 2024-04-16 RX ORDER — ONDANSETRON 2 MG/ML
INJECTION INTRAMUSCULAR; INTRAVENOUS PRN
Status: DISCONTINUED | OUTPATIENT
Start: 2024-04-16 | End: 2024-04-16

## 2024-04-16 RX ORDER — PROPOFOL 10 MG/ML
INJECTION, EMULSION INTRAVENOUS PRN
Status: DISCONTINUED | OUTPATIENT
Start: 2024-04-16 | End: 2024-04-16

## 2024-04-16 RX ADMIN — FENTANYL CITRATE 50 MCG: 50 INJECTION, SOLUTION INTRAMUSCULAR; INTRAVENOUS at 19:10

## 2024-04-16 RX ADMIN — PROPOFOL 150 MG: 10 INJECTION, EMULSION INTRAVENOUS at 13:42

## 2024-04-16 RX ADMIN — Medication 50 MG: at 13:42

## 2024-04-16 RX ADMIN — PROPOFOL 50 MG: 10 INJECTION, EMULSION INTRAVENOUS at 14:11

## 2024-04-16 RX ADMIN — HYDROMORPHONE HYDROCHLORIDE 0.2 MG: 0.2 INJECTION, SOLUTION INTRAMUSCULAR; INTRAVENOUS; SUBCUTANEOUS at 20:07

## 2024-04-16 RX ADMIN — LIDOCAINE HYDROCHLORIDE 80 MG: 20 INJECTION, SOLUTION INFILTRATION; PERINEURAL at 13:42

## 2024-04-16 RX ADMIN — ACETAMINOPHEN 975 MG: 325 TABLET, FILM COATED ORAL at 12:55

## 2024-04-16 RX ADMIN — DEXAMETHASONE SODIUM PHOSPHATE 4 MG: 4 INJECTION, SOLUTION INTRA-ARTICULAR; INTRALESIONAL; INTRAMUSCULAR; INTRAVENOUS; SOFT TISSUE at 13:56

## 2024-04-16 RX ADMIN — ACETAMINOPHEN 975 MG: 325 TABLET, FILM COATED ORAL at 20:58

## 2024-04-16 RX ADMIN — CHLORHEXIDINE GLUCONATE 10 ML: 1.2 RINSE ORAL at 12:55

## 2024-04-16 RX ADMIN — SODIUM CHLORIDE, POTASSIUM CHLORIDE, SODIUM LACTATE AND CALCIUM CHLORIDE: 600; 310; 30; 20 INJECTION, SOLUTION INTRAVENOUS at 13:29

## 2024-04-16 RX ADMIN — HYDROMORPHONE HYDROCHLORIDE 0.2 MG: 0.2 INJECTION, SOLUTION INTRAMUSCULAR; INTRAVENOUS; SUBCUTANEOUS at 19:48

## 2024-04-16 RX ADMIN — HYDROMORPHONE HYDROCHLORIDE 0.5 MG: 1 INJECTION, SOLUTION INTRAMUSCULAR; INTRAVENOUS; SUBCUTANEOUS at 15:58

## 2024-04-16 RX ADMIN — PHENYLEPHRINE HYDROCHLORIDE 100 MCG: 10 INJECTION INTRAVENOUS at 14:27

## 2024-04-16 RX ADMIN — FENTANYL CITRATE 25 MCG: 50 INJECTION, SOLUTION INTRAMUSCULAR; INTRAVENOUS at 17:19

## 2024-04-16 RX ADMIN — Medication 2 G: at 13:47

## 2024-04-16 RX ADMIN — FENTANYL CITRATE 100 MCG: 50 INJECTION INTRAMUSCULAR; INTRAVENOUS at 13:42

## 2024-04-16 RX ADMIN — PROPOFOL 50 MG: 10 INJECTION, EMULSION INTRAVENOUS at 14:12

## 2024-04-16 RX ADMIN — HYDROMORPHONE HYDROCHLORIDE 0.2 MG: 0.2 INJECTION, SOLUTION INTRAMUSCULAR; INTRAVENOUS; SUBCUTANEOUS at 19:34

## 2024-04-16 RX ADMIN — PHENYLEPHRINE HYDROCHLORIDE 100 MCG: 10 INJECTION INTRAVENOUS at 16:03

## 2024-04-16 RX ADMIN — HYDROMORPHONE HYDROCHLORIDE 0.5 MG: 1 INJECTION, SOLUTION INTRAMUSCULAR; INTRAVENOUS; SUBCUTANEOUS at 14:11

## 2024-04-16 RX ADMIN — ONDANSETRON 4 MG: 2 INJECTION INTRAMUSCULAR; INTRAVENOUS at 15:47

## 2024-04-16 RX ADMIN — PHENYLEPHRINE HYDROCHLORIDE 50 MCG: 10 INJECTION INTRAVENOUS at 14:46

## 2024-04-16 RX ADMIN — FENTANYL CITRATE 50 MCG: 50 INJECTION, SOLUTION INTRAMUSCULAR; INTRAVENOUS at 18:59

## 2024-04-16 RX ADMIN — OXYCODONE HYDROCHLORIDE 5 MG: 5 TABLET ORAL at 20:17

## 2024-04-16 RX ADMIN — SUGAMMADEX 200 MG: 100 INJECTION, SOLUTION INTRAVENOUS at 14:11

## 2024-04-16 ASSESSMENT — ACTIVITIES OF DAILY LIVING (ADL)
ADLS_ACUITY_SCORE: 36
ADLS_ACUITY_SCORE: 35
ADLS_ACUITY_SCORE: 33
ADLS_ACUITY_SCORE: 35
ADLS_ACUITY_SCORE: 36
ADLS_ACUITY_SCORE: 35
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
## 2024-04-16 NOTE — ANESTHESIA CARE TRANSFER NOTE
Patient: Peter Perez    Procedure: Procedure(s):  IRRIGATION AND DEBRIDEMENT, MANDIBLE APPLICATION RECONSTRUCTION PLATE PLACEMENT,  AND EXTRACTION OF TEETH #2,16,19,31       Diagnosis: Caries [K02.9]  Diagnosis Additional Information: No value filed.    Anesthesia Type:   General     Note:    Oropharynx: oropharynx clear of all foreign objects and spontaneously breathing  Level of Consciousness: awake  Oxygen Supplementation: face mask  Level of Supplemental Oxygen (L/min / FiO2): 6  Independent Airway: airway patency satisfactory and stable  Dentition: dentition unchanged  Vital Signs Stable: post-procedure vital signs reviewed and stable  Report to RN Given: handoff report given  Patient transferred to: PACU    Handoff Report: Identifed the Patient, Identified the Reponsible Provider, Reviewed the pertinent medical history, Discussed the surgical course, Reviewed Intra-OP anesthesia mangement and issues during anesthesia, Set expectations for post-procedure period and Allowed opportunity for questions and acknowledgement of understanding      Vitals:  Vitals Value Taken Time   /67    Temp 36    Pulse 79 04/16/24 1619   Resp 11 04/16/24 1619   SpO2 96 % 04/16/24 1619   Vitals shown include unfiled device data.    Electronically Signed By: LIZ MONTOYA CRNA  April 16, 2024  4:19 PM

## 2024-04-16 NOTE — BRIEF OP NOTE
[facility]    Brief Operative Note    Pre-operative diagnosis: Caries [K02.9]  Post-operative diagnosis Same as pre-operative diagnosis    Procedure: IRRIGATION AND DEBRIDEMENT, MANDIBLE APPLICATION RECONSTRUCTION PLATE PLACEMENT,  AND EXTRACTION OF TEETH #2,16,19,31, Left - Jaw    Surgeon: Surgeons and Role:     * Tanmay Vigil DDS - Primary  Anesthesia: General   Estimated Blood Loss: Less than 10 ml    Drains: None  Specimens:   ID Type Source Tests Collected by Time Destination   1 : Left Submandibular Lymph Node Tissue Mandible SURGICAL PATHOLOGY EXAM Tanmay Vigil DDS 4/16/2024  2:26 PM    2 : Left Mandibular lesion Tissue Mandible SURGICAL PATHOLOGY EXAM Tanmay Vigil DDS 4/16/2024  2:38 PM    3 : Tissue Left Mandible Tissue Mandible SURGICAL PATHOLOGY EXAM Tanmay Vigil DDS 4/16/2024  3:00 PM    A : Tissue Left Mandible Tissue Mandible ANAEROBIC BACTERIAL CULTURE ROUTINE, GRAM STAIN, FUNGAL OR YEAST CULTURE ROUTINE, AEROBIC BACTERIAL CULTURE ROUTINE Tanmay Vigil DDS 4/16/2024  3:02 PM      Findings:   Necrotic left mandible with sequestrum. Sent for pathology and cultures.   Complications: None.  Implants:   Implant Name Type Inv. Item Serial No.  Lot No. LRB No. Used Action   CUSTOM PLATE     0 N/A 1 Implanted   IMP SCR SYN MATRIX 2.0X10MM SELF TAP  [phone] - [id number] Metallic Hardware/[place] IMP SCR SYN MATRIX 2.0X10MM SELF TAP  [phone]  SYNTHES-STRATEC 0 N/A 1 Implanted   IMP SCR SYN MATRIX 2.0X16MM SELF TAP  [phone] - [id number] Metallic Hardware/[place] IMP SCR SYN MATRIX 2.0X16MM SELF TAP  [phone]  SYNTHES-STRATEC 0 N/A 1 Implanted   IMP SCR SYN MATRIX 2.0X10MM SELF TAP LOCKING  [phone] - [id number] Metallic Hardware/[place] IMP SCR SYN MATRIX 2.0X10MM SELF TAP LOCKING  [phone]  SYNTHES-STRATEC 0 Left 3 Implanted   IMP SCR SYN MATRIX 2.0X16MM SELF TAP LOCKING  [phone] - [id number] Metallic  Hardware/[place] IMP SCR SYN MATRIX 2.0X16MM SELF TAP LOCKING  [phone]  SYNTHES-STRATEC 0 Left 2 Implanted

## 2024-04-16 NOTE — ANESTHESIA PROCEDURE NOTES
Airway       Patient location during procedure: OR       Procedure Start/Stop Times: 4/16/2024 1:46 PM  Staff -        CRNA: Julian Wang APRN CRNA       Performed By: CRNA  Consent for Airway        Urgency: elective  Indications and Patient Condition       Indications for airway management: tre-procedural       Induction type:intravenous       Mask difficulty assessment: 2 - vent by mask + OA or adjuvant +/- NMBA    Final Airway Details       Final airway type: endotracheal airway       Successful airway: BOB and Nasal  Endotracheal Airway Details        ETT size (mm): 7.5       Cuffed: yes       Successful intubation technique: video laryngoscopy       VL Blade Size: Glidescope 4       Grade View of Cords: 1       Adjucts: stylet       Position: Right       Measured from: nares       Bite block used: None    Post intubation assessment        Placement verified by: capnometry, equal breath sounds and chest rise        Number of attempts at approach: 1       Number of other approaches attempted: 0       Secured with: sutures and other (comment) (sutured by OMFS)       Ease of procedure: easy       Dentition: Unchanged (Unchanged from prior condition)    Medication(s) Administered   Medication Administration Time: 4/16/2024 1:46 PM

## 2024-04-17 LAB
ATRIAL RATE - MUSE: 65 BPM
DIASTOLIC BLOOD PRESSURE - MUSE: NORMAL MMHG
INTERPRETATION ECG - MUSE: NORMAL
P AXIS - MUSE: 71 DEGREES
PR INTERVAL - MUSE: 132 MS
QRS DURATION - MUSE: 86 MS
QT - MUSE: 458 MS
QTC - MUSE: 476 MS
R AXIS - MUSE: -29 DEGREES
SYSTOLIC BLOOD PRESSURE - MUSE: NORMAL MMHG
T AXIS - MUSE: 19 DEGREES
VENTRICULAR RATE- MUSE: 65 BPM

## 2024-04-17 NOTE — ANESTHESIA POSTPROCEDURE EVALUATION
Patient: Peter Perez    Procedure: Procedure(s):  IRRIGATION AND DEBRIDEMENT, MANDIBLE APPLICATION RECONSTRUCTION PLATE PLACEMENT,  AND EXTRACTION OF TEETH #2,16,19,31       Anesthesia Type:  General    Note:  Disposition: Outpatient   Postop Pain Control: Uneventful            Sign Out: Well controlled pain   PONV: No   Neuro/Psych: Uneventful            Sign Out: Acceptable/Baseline neuro status   Airway/Respiratory: Uneventful            Sign Out: Acceptable/Baseline resp. status   CV/Hemodynamics: Uneventful            Sign Out: Acceptable CV status; No obvious hypovolemia; No obvious fluid overload   Other NRE: NONE   DID A NON-ROUTINE EVENT OCCUR? No           Last vitals:  Vitals Value Taken Time   /75 04/16/24 2030   Temp 36.4  C (97.5  F) 04/16/24 2030   Pulse 72 04/16/24 2040   Resp 11 04/16/24 2040   SpO2 100 % 04/16/24 2040   Vitals shown include unfiled device data.    Electronically Signed By: Rony Donald  April 16, 2024  8:40 PM

## 2024-04-18 ENCOUNTER — TRANSFERRED RECORDS (OUTPATIENT)
Dept: HEALTH INFORMATION MANAGEMENT | Facility: CLINIC | Age: 64
End: 2024-04-18
Payer: COMMERCIAL

## 2024-04-18 ENCOUNTER — DOCUMENTATION ONLY (OUTPATIENT)
Dept: FAMILY MEDICINE | Facility: CLINIC | Age: 64
End: 2024-04-18
Payer: COMMERCIAL

## 2024-04-18 DIAGNOSIS — I69.954 FLACCID HEMIPLEGIA OF LEFT NONDOMINANT SIDE AS LATE EFFECT OF CEREBROVASCULAR DISEASE, UNSPECIFIED CEREBROVASCULAR DISEASE TYPE (H): Primary | ICD-10-CM

## 2024-04-18 DIAGNOSIS — D64.9 ANEMIA, UNSPECIFIED TYPE: Primary | ICD-10-CM

## 2024-04-18 LAB
BACTERIA TISS BX CULT: ABNORMAL
BACTERIA TISS BX CULT: ABNORMAL

## 2024-04-18 RX ORDER — FERROUS SULFATE 325(65) MG
325 TABLET ORAL EVERY OTHER DAY
Qty: 60 TABLET | Refills: 2 | Status: SHIPPED | OUTPATIENT
Start: 2024-04-18

## 2024-04-18 NOTE — PROGRESS NOTES
To be completed in Nursing note:    Please reference list for forms that require a visit for completion.  Please remind patients that providers are given 3-5 business days to complete and return forms.      Form type:[place]    Date form received: 24    Date form completed by Physician:24    How was form returned to patient (mailed, faxed, or at  for patient to ):faxed [phone]    Date form mailed/faxed/left at  for patient and sent to HIM for scannin24    Once form is left for patient, faxed, or mailed PCS will then close the documentation only encounter.

## 2024-04-19 ENCOUNTER — PATIENT OUTREACH (OUTPATIENT)
Dept: CARE COORDINATION | Facility: CLINIC | Age: 64
End: 2024-04-19
Payer: COMMERCIAL

## 2024-04-19 NOTE — PROGRESS NOTES
Care Coordination:4/19/2024      DME: [id number]  Order: Cane  Provider Fax: [facility]: [phone]              Jairo Reno Sr.  Clinic Care Coordinator   [address] [place] [address]  [facility] Line: [phone]  Direct:[phone]  Fax: [phone]

## 2024-04-20 LAB
BACTERIA TISS BX CULT: ABNORMAL

## 2024-04-21 NOTE — OP NOTE
Oral and Maxillofacial Surgery  Operative Note       Preoperative Diagnosis  Caries [K02.9]    Postoperative Diagnosis  Same    Procedure(s):  Debridement of left mandible  Extraction of teeth #2, 16, 19, 31  Application of reconstruction hardware    Surgeon:  Tanmay Vigil DDS, MD, FACS    Resident Surgeon(s):  Bertha Haynes DDS    Resident Assistants:  Judith Bond DDS    Other surgical staff (if any):  Circulator: Chasity Turner RN; Carolina Lewis RN  Relief Circulator: Tesfaye Prieto RN  Relief Scrub: Saran Carmichael RN  Scrub Person: Dinora Christine RN; Liss Lovett    Anesthesia  Anesthesiologist: Galindo Friedman MD; Sharon De Paz MD  CRNA: Julian Wang APRN CRNA; Anthony Matamoros APRN CRNA    EBL:   25 mL    Drains: None    Prosthetic Devices:   Implant Name Type Inv. Item Serial No.  Lot No. LRB No. Used Action   CUSTOM PLATE Metallic Hardware/[employer]  .510B SYNTHES 0 N/A 1 Implanted   IMP SCR SYN MATRIX 2.0X10MM SELF TAP  [phone] - [id number] Metallic Hardware/[employer]way IMP SCR SYN MATRIX 2.0X10MM SELF TAP  [phone]  SYNTHES-STRATEC 0 N/A 1 Implanted   IMP SCR SYN MATRIX 2.0X16MM SELF TAP  [phone] - [id number] Metallic Hardware/[place] IMP SCR SYN MATRIX 2.0X16MM SELF TAP  [phone]  SYNTHES-STRATEC 0 N/A 1 Implanted   IMP SCR SYN MATRIX 2.0X10MM SELF TAP LOCKING  [phone] - [id number] Metallic Hardware/[place]way IMP SCR SYN MATRIX 2.0X10MM SELF TAP LOCKING  [phone]  SYNTHES-STRATEC 0 Left 3 Implanted   IMP SCR SYN MATRIX 2.0X16MM SELF TAP LOCKING  [phone] - [id number] Metallic Hardware/[place]way IMP SCR SYN MATRIX 2.0X16MM SELF TAP LOCKING  [phone]  SYNTHES-STRATEC 0 Left 2 Implanted       Specimen Removed:   ID Type Source Tests Collected by Time Destination   1 : Left Submandibular Lymph Node Tissue Mandible SURGICAL PATHOLOGY EXAM Tanmay Vigil DDS 4/16/2024  2:26 PM    2 : Left Mandibular lesion Tissue Mandible  SURGICAL PATHOLOGY EXAM Tanmay Vigil DDS 4/16/2024  2:38 PM    3 : Tissue Left Mandible Tissue Mandible SURGICAL PATHOLOGY EXAM Tanmay Vigil DDS 4/16/2024  3:00 PM    A : Tissue Left Mandible Tissue Mandible ANAEROBIC BACTERIAL CULTURE ROUTINE, GRAM STAIN, FUNGAL OR YEAST CULTURE ROUTINE, AEROBIC BACTERIAL CULTURE ROUTINE Tanmay Vigil DDS 4/16/2024  3:02 PM        Complications: none    Indications for Surgery:   Based on clinical and radiographic findings, the patient was offered debridement of left mandible extraction of teeth #1, 16, 19 and 32 and application of reconstruction hardware in an OR setting. Due to infection, caries, periodontitis and possible pathologic fracture of left mandible the patient was indicated for the above listed procedures in the operating room.  The procedure, benefits, risks, alternatives including no treatment were discussed in detail with the patient, and discussed with the patient's daughter over the phone, and the patient elected to proceed with the planned surgery.     Procedure description:   On the day of surgery, the patient was seen by myself and Dr. Vigil in the pre-op holding area.  The procedure, benefits, risks, alternatives including no treatment were discussed again with the patient and an informed written consent was obtained for the planned procedure.  Patient was transported to the operating room and transferred to the OR table in a supine position.  Airway was secured via nasal tube by the anesthesia team.  Throat pack placed, oral cavity cleansed with chlorhexidine mouth rinse, 10cc 0.25%marcaine with epinephrine was used for infiltration of the left body and parasymphyseal region. The patient was prepped and draped in a sterile fashion for the planned procedure.  Surgeons  scrubbed and donned sterile attire. A surgical timeout was performed by all members of the team.     Skin marker used to amy inferior border of the mandible and incision line  approximately 20mm inferior. Using a 15 blade, a skin incision was created, hemostasis was achieved with a monopolar cautery. An incision was completed through platysma with monopolar cautery. Then using a series of blunt and sharp dissections, the dissection was carried to the inferior border of the mandible with Bovie cautery and consecutive nerve testing avoiding CNVII. The facial vein and artery were encountered and ligated. Two firm, but mobile, lymph nodes were encountered and removed, sent for surgical pathology. The periosteum was incised and reflected with the periosteal elevator and Bovie cautery. Irregular bone and granulation tissue noted at left mandibular body. A pre-bent plate was trimmed to accommodate three screws anteriorly and four screws posteriorly. The plate was determined to be well adapted to the mandible and screw holes were pre-drilled. The plate was removed and debridement of the infected and osteomyelitic bone completed. A culture was obtained and biopsy for surgical pathology. Left mandible debridement with rongeurs and round bur until healthy bleeding bone noted. Small dehiscence into oral cavity appreciated, and thin amount of lingual bone appreciated. Reconstruction plate adapted to mandible and bicortical screws, three anteriorly and four posteriorly to secure plate. Area irrigated with copious amount of sterile saline, moist strung out raytec packed into left neck incision.     Attention turned intraorally, teeth #2, 16, 19 and 32 mobilized with a small and large elevator, delivered with 150 and 23 forceps, extraction sites irrigated and gel foal placed. Left intraoral dehiscence closed with 3-0 vicryl in a running locking fashion. Occlusion noted to be stable and reproducible.    Attention to transcervical approach, raytec removed, area irrigated with copious amount of sterile saline. In an interrupted fashion 3-0 vicryl was used to close periosteum. Incision closed in layers, deep  sutures complete in an interrupted fashion with 3-0 vicryl, platysma re-approximated, subcutaneous layer re-approximated and skin closed with 4-0 prolene in a running locking fashion. Bacitracin placed and island dressing.        Thus, the planned procedure completed, the throat pack was removed, the patient's care was given back to the anesthesia team.      I was told by the OR nurse staff that all needles, sponges, instruments were found to be correct and there were no intraoperative complications noted.     Attending staff, Dr. Vigil, was present for the entire duration of the procedure.    Bertha Haynes DDS  [facility] Resident, PGY-4

## 2024-04-22 ENCOUNTER — DOCUMENTATION ONLY (OUTPATIENT)
Dept: OTHER | Facility: CLINIC | Age: 64
End: 2024-04-22
Payer: COMMERCIAL

## 2024-04-24 ENCOUNTER — TRANSFERRED RECORDS (OUTPATIENT)
Dept: HEALTH INFORMATION MANAGEMENT | Facility: CLINIC | Age: 64
End: 2024-04-24
Payer: COMMERCIAL

## 2024-04-25 LAB
PATH REPORT.COMMENTS IMP SPEC: NORMAL
PATH REPORT.COMMENTS IMP SPEC: NORMAL
PATH REPORT.FINAL DX SPEC: NORMAL
PATH REPORT.GROSS SPEC: NORMAL
PATH REPORT.MICROSCOPIC SPEC OTHER STN: NORMAL
PATH REPORT.RELEVANT HX SPEC: NORMAL
PHOTO IMAGE: NORMAL

## 2024-04-30 ENCOUNTER — LAB (OUTPATIENT)
Dept: LAB | Facility: CLINIC | Age: 64
End: 2024-04-30
Payer: COMMERCIAL

## 2024-04-30 DIAGNOSIS — T81.41XA INFECTION OF SUPERFICIAL INCISIONAL SURGICAL SITE AFTER PROCEDURE, INITIAL ENCOUNTER: ICD-10-CM

## 2024-04-30 DIAGNOSIS — T81.41XA INFECTION OF SUPERFICIAL INCISIONAL SURGICAL SITE AFTER PROCEDURE, INITIAL ENCOUNTER: Primary | ICD-10-CM

## 2024-04-30 PROCEDURE — 87102 FUNGUS ISOLATION CULTURE: CPT

## 2024-04-30 PROCEDURE — 87075 CULTR BACTERIA EXCEPT BLOOD: CPT

## 2024-04-30 PROCEDURE — 87205 SMEAR GRAM STAIN: CPT

## 2024-05-02 ENCOUNTER — MEDICAL CORRESPONDENCE (OUTPATIENT)
Dept: HEALTH INFORMATION MANAGEMENT | Facility: CLINIC | Age: 64
End: 2024-05-02
Payer: COMMERCIAL

## 2024-05-02 DIAGNOSIS — Z53.9 DIAGNOSIS NOT YET DEFINED: Primary | ICD-10-CM

## 2024-05-02 LAB
BACTERIA SKIN AEROBE CULT: ABNORMAL
GRAM STAIN RESULT: ABNORMAL
GRAM STAIN RESULT: ABNORMAL

## 2024-05-03 LAB — BACTERIA SPEC CULT: NORMAL

## 2024-05-10 ENCOUNTER — OFFICE VISIT (OUTPATIENT)
Dept: FAMILY MEDICINE | Facility: CLINIC | Age: 64
End: 2024-05-10
Payer: COMMERCIAL

## 2024-05-10 VITALS
BODY MASS INDEX: 19.28 KG/M2 | SYSTOLIC BLOOD PRESSURE: 145 MMHG | RESPIRATION RATE: 16 BRPM | WEIGHT: 134.4 LBS | HEART RATE: 85 BPM | DIASTOLIC BLOOD PRESSURE: 74 MMHG | TEMPERATURE: 97.5 F

## 2024-05-10 DIAGNOSIS — E44.0 MALNUTRITION OF MODERATE DEGREE (H): ICD-10-CM

## 2024-05-10 DIAGNOSIS — M05.9 RHEUMATOID ARTHRITIS, SEROPOSITIVE (H): ICD-10-CM

## 2024-05-10 DIAGNOSIS — M25.551 HIP PAIN, RIGHT: ICD-10-CM

## 2024-05-10 DIAGNOSIS — F11.20 OPIOID TYPE DEPENDENCE, EPISODIC (H): ICD-10-CM

## 2024-05-10 DIAGNOSIS — N18.31 STAGE 3A CHRONIC KIDNEY DISEASE (H): Primary | ICD-10-CM

## 2024-05-10 DIAGNOSIS — I69.954 FLACCID HEMIPLEGIA OF LEFT NONDOMINANT SIDE AS LATE EFFECT OF CEREBROVASCULAR DISEASE, UNSPECIFIED CEREBROVASCULAR DISEASE TYPE (H): ICD-10-CM

## 2024-05-10 PROBLEM — F19.20 POLYSUBSTANCE DEPENDENCE (H): Status: ACTIVE | Noted: 2024-05-10

## 2024-05-10 PROBLEM — J15.9 COMMUNITY ACQUIRED BACTERIAL PNEUMONIA: Status: ACTIVE | Noted: 2024-03-21

## 2024-05-10 PROBLEM — Z86.73 HISTORY OF CEREBROVASCULAR ACCIDENT: Chronic | Status: ACTIVE | Noted: 2020-08-15

## 2024-05-10 PROBLEM — E87.5 HYPERKALEMIA: Chronic | Status: ACTIVE | Noted: 2020-10-08

## 2024-05-10 PROBLEM — S42.401D CLOSED FRACTURE OF DISTAL END OF RIGHT HUMERUS WITH ROUTINE HEALING: Status: ACTIVE | Noted: 2023-09-28

## 2024-05-10 PROBLEM — C80.1 ADENOCARCINOMA (H): Status: ACTIVE | Noted: 2020-08-15

## 2024-05-10 PROBLEM — F17.209 TOBACCO USE DISORDER, CONTINUOUS: Status: ACTIVE | Noted: 2024-05-10

## 2024-05-10 PROBLEM — C79.51: Status: ACTIVE | Noted: 2020-08-24

## 2024-05-10 PROBLEM — C79.51 MALIGNANT NEOPLASM METASTATIC TO BONE (H): Status: ACTIVE | Noted: 2024-03-20

## 2024-05-10 PROBLEM — G81.00 FLACCID HEMIPLEGIA AFFECTING UNSPECIFIED SIDE (H): Status: ACTIVE | Noted: 2024-05-10

## 2024-05-10 PROBLEM — J12.1 RSV (RESPIRATORY SYNCYTIAL VIRUS PNEUMONIA): Status: ACTIVE | Noted: 2024-03-21

## 2024-05-10 PROBLEM — K04.7 DENTAL INFECTION: Status: ACTIVE | Noted: 2024-01-10

## 2024-05-10 PROBLEM — G89.3 PAIN DUE TO NEOPLASM: Chronic | Status: ACTIVE | Noted: 2024-03-21

## 2024-05-10 PROBLEM — M54.10 RADICULAR PAIN OF RIGHT LOWER EXTREMITY: Status: ACTIVE | Noted: 2020-05-13

## 2024-05-10 PROBLEM — N25.89 RTA (RENAL TUBULAR ACIDOSIS): Status: ACTIVE | Noted: 2023-08-29

## 2024-05-10 PROBLEM — D50.9 IRON DEFICIENCY ANEMIA: Status: ACTIVE | Noted: 2024-05-10

## 2024-05-10 PROBLEM — F10.90 ALCOHOL USE DISORDER: Status: RESOLVED | Noted: 2024-02-05 | Resolved: 2024-05-10

## 2024-05-10 PROBLEM — D75.839 THROMBOCYTOSIS: Status: ACTIVE | Noted: 2020-07-21

## 2024-05-10 PROBLEM — S14.3XXS: Status: ACTIVE | Noted: 2023-08-28

## 2024-05-10 PROBLEM — G81.04: Status: ACTIVE | Noted: 2019-06-11

## 2024-05-10 PROBLEM — D72.829 LEUKOCYTOSIS: Status: ACTIVE | Noted: 2020-07-21

## 2024-05-10 LAB
CREAT UR-MCNC: 77.7 MG/DL
MICROALBUMIN UR-MCNC: 35.7 MG/L
MICROALBUMIN/CREAT UR: 45.95 MG/G CR (ref 0–17)

## 2024-05-10 PROCEDURE — 82043 UR ALBUMIN QUANTITATIVE: CPT

## 2024-05-10 PROCEDURE — 82570 ASSAY OF URINE CREATININE: CPT

## 2024-05-10 PROCEDURE — 99214 OFFICE O/P EST MOD 30 MIN: CPT | Mod: GC

## 2024-05-10 RX ORDER — CALCITRIOL 0.25 UG/1
0.25 CAPSULE, LIQUID FILLED ORAL DAILY
COMMUNITY
Start: 2024-04-18 | End: 2025-04-18

## 2024-05-10 RX ORDER — NAPROXEN 500 MG/1
500 TABLET ORAL 2 TIMES DAILY WITH MEALS
COMMUNITY
End: 2024-05-10

## 2024-05-10 RX ORDER — NAPROXEN 500 MG/1
500 TABLET ORAL 2 TIMES DAILY WITH MEALS
Qty: 180 TABLET | Refills: 11 | Status: SHIPPED | OUTPATIENT
Start: 2024-05-10

## 2024-05-10 RX ORDER — BUPRENORPHINE 5 UG/H
1 PATCH, EXTENDED RELEASE TRANSDERMAL WEEKLY
COMMUNITY
Start: 2024-04-24

## 2024-05-10 RX ORDER — ASPIRIN 81 MG/1
81 TABLET ORAL DAILY
COMMUNITY
Start: 2024-02-07

## 2024-05-10 RX ORDER — GINSENG 100 MG
CAPSULE ORAL
COMMUNITY
Start: 2024-04-16 | End: 2024-05-10

## 2024-05-10 RX ORDER — DRONABINOL 2.5 MG/1
2.5 CAPSULE ORAL
COMMUNITY

## 2024-05-10 NOTE — PATIENT INSTRUCTIONS
Thank you for coming in to see us today!    - I prescribed Voltaren gel to be used up to 4 times per day as needed for your hip pain  - I also prescribed Naproxen for the hip pain. Take this twice per day with food.  - Take Jardiance once every day to protect your kidneys  - I ordered more home physical therapy specifically for your right hip  - Follow up in 1 month for pre-op prior to cataract surgery    Terence Martines, DO

## 2024-05-10 NOTE — PROGRESS NOTES
Preceptor attestation:  Vital signs reviewed: BP (!) 145/74   Pulse 85   Temp 97.5  F (36.4  C) (Oral)   Resp 16   Wt 61 kg (134 lb 6.4 oz)   BMI 19.28 kg/m      Patient seen, evaluated, and discussed with the resident.  I verified the content of the note, which accurately reflects my assessment of the patient and the plan of care.    Supervising physician: Charisse Smith MD  [facility]   pt is non-smoker, no alcohol or illicit drug use.

## 2024-05-12 NOTE — PROGRESS NOTES
Assessment & Plan     Hip pain, right  Opioid type dependence, episodic (H)  Flaccid hemiplegia of left nondominant side as late effect of cerebrovascular disease, unspecified cerebrovascular disease type (H)  Patient complaining of right hip/posterior buttock pain. Reports it has been present for awhile. Patient is severely deconditioned and with a history of CVA resulting in left hemiplegia, I believe he is overcompensating when walking, putting more stress on the right side. Pain appears to be more SI joint related based on location and exam. Minimal pain with internal rotation. Pain with hip external rotation. No groin pain. I prescribed some naproxen and Voltaren gel for pain. I also believe patient would strongly benefit from physical therapy. He is currently receiving home PT for his stroke rehab, but a new order was placed specifically for the right hip. Will avoid narcotic pain medication as patient had been on this in the past and currently uses a Butrans patch.  - diclofenac (VOLTAREN) 1 % topical gel  Dispense: 350 g; Refill: 11  - naproxen (NAPROSYN) 500 MG tablet  Dispense: 180 tablet; Refill: 11  - Home Care Referral    Stage 3a chronic kidney disease (H)  Patient has a history of CKD and is not on any kidney-protective medication. Some microalbuminuria present on testing today. Will prescribe Jardiance daily.   - Albumin Random Urine Quantitative with Creat Ratio  - empagliflozin (JARDIANCE) 10 MG TABS tablet  Dispense: 90 tablet; Refill: 0    Malnutrition of moderate degree (H24)  Patient is very deconditioned. Will discuss increasing caloric intake and possibly supplementing with protein shakes at next visit    Rheumatoid arthritis, seropositive (H)  Patient has a history of RA, but I do not believe that is contributing to his hip pain. Not on any rheumatologic medications.    Return in about 1 month (around 6/10/2024) for cataract surgery pre-op.    Nelson Green is a 63 year old, presenting  "for the following health issues:  Follow Up (F/U echo )    Patient presents because \"I was told to follow up\". It appears there was an Echocardiogram ordered at his last visit and he was supposed to follow up the results of that. He did not receive the Echo.  Patient also complains of some slight worsening of his right hip pain. He currently uses a Butrans patch for pain, but reports it is minimally effective at controlling his hip pain.        ROS: 10 point ROS neg other than the symptoms noted above in the HPI.       Objective    BP (!) 145/74   Pulse 85   Temp 97.5  F (36.4  C) (Oral)   Resp 16   Wt 61 kg (134 lb 6.4 oz)   BMI 19.28 kg/m    Body mass index is 19.28 kg/m .  Physical Exam  Vitals reviewed.   Constitutional:       General: He is not in acute distress.     Appearance: He is not toxic-appearing.   HENT:      Head: Normocephalic and atraumatic.      Right Ear: External ear normal.      Left Ear: External ear normal.   Eyes:      Extraocular Movements: Extraocular movements intact.      Conjunctiva/sclera: Conjunctivae normal.   Pulmonary:      Effort: Pulmonary effort is normal. No respiratory distress.   Musculoskeletal:      Comments: Flaccid hemiplegia of LUE. Weakness of LLE.  RLE: Limited internal rotation of the hip without pain. External rotation of the hip elicits pain posteriorly. No groin pain. Full hip flexion. 5/5 strength with hip flexion and extension against resistance. Ambulatory with a cane.   Neurological:      Mental Status: He is alert and oriented to person, place, and time. Mental status is at baseline.   Psychiatric:         Mood and Affect: Mood normal.         Behavior: Behavior normal.         Thought Content: Thought content normal.         Judgment: Judgment normal.          Results for orders placed or performed in visit on 05/10/24   Albumin Random Urine Quantitative with Creat Ratio     Status: Abnormal   Result Value Ref Range    Creatinine Urine mg/dL 77.7 mg/dL    " Albumin Urine mg/L 35.7 mg/L    Albumin Urine mg/g Cr 45.95 (H) 0.00 - 17.00 mg/g Cr         Signed Electronically by: Terence Martines DO

## 2024-05-13 ENCOUNTER — DOCUMENTATION ONLY (OUTPATIENT)
Dept: FAMILY MEDICINE | Facility: CLINIC | Age: 64
End: 2024-05-13
Payer: COMMERCIAL

## 2024-05-13 NOTE — PROGRESS NOTES
To be completed in Nursing note:    Please reference list for forms that require a visit for completion.  Please remind patients that providers are given 3-5 business days to complete and return forms.      Form type:[facility] Home visit plan    Date form received: 24    Date form completed by Physician:24    How was form returned to patient (mailed, faxed, or at  for patient to ):faxed    Date form mailed/faxed/left at  for patient and sent to HIM for scannin24      Once form is left for patient, faxed, or mailed PCS will then close the documentation only encounter.

## 2024-05-13 NOTE — PROGRESS NOTES
To be completed in Nursing note:    Please reference list for forms that require a visit for completion.  Please remind patients that providers are given 3-5 business days to complete and return forms.      Form type:  [facility] MSW Assessment    Date form received: 24    Date form completed by Physician:24    How was form returned to patient (mailed, faxed, or at  for patient to ):faxed    Date form mailed/faxed/left at  for patient and sent to HIM for scannin24      Once form is left for patient, faxed, or mailed PCS will then close the documentation only encounter.

## 2024-05-14 LAB — BACTERIA TISS BX CULT: NO GROWTH

## 2024-05-16 ENCOUNTER — MEDICAL CORRESPONDENCE (OUTPATIENT)
Dept: HEALTH INFORMATION MANAGEMENT | Facility: CLINIC | Age: 64
End: 2024-05-16
Payer: COMMERCIAL

## 2024-05-16 ENCOUNTER — TRANSFERRED RECORDS (OUTPATIENT)
Dept: HEALTH INFORMATION MANAGEMENT | Facility: CLINIC | Age: 64
End: 2024-05-16
Payer: COMMERCIAL

## 2024-05-21 DIAGNOSIS — G89.29 CHRONIC RIGHT-SIDED LOW BACK PAIN, UNSPECIFIED WHETHER SCIATICA PRESENT: Primary | ICD-10-CM

## 2024-05-21 DIAGNOSIS — M54.50 CHRONIC RIGHT-SIDED LOW BACK PAIN, UNSPECIFIED WHETHER SCIATICA PRESENT: Primary | ICD-10-CM

## 2024-05-21 RX ORDER — DULOXETIN HYDROCHLORIDE 60 MG/1
60 CAPSULE, DELAYED RELEASE ORAL DAILY
Qty: 90 CAPSULE | Refills: 3 | Status: SHIPPED | OUTPATIENT
Start: 2024-05-21

## 2024-05-22 ENCOUNTER — DOCUMENTATION ONLY (OUTPATIENT)
Dept: FAMILY MEDICINE | Facility: CLINIC | Age: 64
End: 2024-05-22
Payer: COMMERCIAL

## 2024-05-22 NOTE — PROGRESS NOTES
To be completed in Nursing note:    Please reference list for forms that require a visit for completion.  Please remind patients that providers are given 3-5 business days to complete and return forms.      [facility]    Date form received: 24    Date form completed by Physician:24    How was form returned to patient (mailed, faxed, or at  for patient to ):  faxed  Date form mailed/faxed/left at  for patient and sent to HIM for scannin24    Once form is left for patient, faxed, or mailed PCS will then close the documentation only encounter.

## 2024-05-24 ENCOUNTER — DOCUMENTATION ONLY (OUTPATIENT)
Dept: FAMILY MEDICINE | Facility: CLINIC | Age: 64
End: 2024-05-24
Payer: COMMERCIAL

## 2024-05-24 ENCOUNTER — MEDICAL CORRESPONDENCE (OUTPATIENT)
Dept: HEALTH INFORMATION MANAGEMENT | Facility: CLINIC | Age: 64
End: 2024-05-24
Payer: COMMERCIAL

## 2024-05-24 NOTE — PROGRESS NOTES
To be completed in Nursing note:    Please reference list for forms that require a visit for completion.  Please remind patients that providers are given 3-5 business days to complete and return forms.      Form type: [facility] Changed Visit Sets    Date form received: 24    Date form completed by Physician:24    How was form returned to patient (mailed, faxed, or at  for patient to ):faxed    Date form mailed/faxed/left at  for patient and sent to HIM for scannin24    Once form is left for patient, faxed, or mailed PCS will then close the documentation only encounter.    
24

## 2024-05-24 NOTE — PROGRESS NOTES
To be completed in Nursing note:    Please reference list for forms that require a visit for completion.  Please remind patients that providers are given 3-5 business days to complete and return forms.      Form type: [facility] [employer] [facility]    Date form received: 24    Date form completed by Physician:24    How was form returned to patient (mailed, faxed, or at  for patient to ):faxed    Date form mailed/faxed/left at  for patient and sent to HIM for scannin24      Once form is left for patient, faxed, or mailed PCS will then close the documentation only encounter.

## 2024-05-26 ENCOUNTER — MEDICAL CORRESPONDENCE (OUTPATIENT)
Dept: HEALTH INFORMATION MANAGEMENT | Facility: CLINIC | Age: 64
End: 2024-05-26
Payer: COMMERCIAL

## 2024-05-28 LAB — BACTERIA SPEC CULT: NO GROWTH

## 2024-05-29 ENCOUNTER — TRANSFERRED RECORDS (OUTPATIENT)
Dept: HEALTH INFORMATION MANAGEMENT | Facility: CLINIC | Age: 64
End: 2024-05-29
Payer: COMMERCIAL

## 2024-06-07 DIAGNOSIS — Z53.9 DIAGNOSIS NOT YET DEFINED: Primary | ICD-10-CM

## 2024-06-17 ENCOUNTER — DOCUMENTATION ONLY (OUTPATIENT)
Dept: FAMILY MEDICINE | Facility: CLINIC | Age: 64
End: 2024-06-17

## 2024-06-17 NOTE — PROGRESS NOTES
To be completed in Nursing note:    Please reference list for forms that require a visit for completion.  Please remind patients that providers are given 3-5 business days to complete and return forms.      Form type:Home Health Certification and Plan of Care    Date form received: 24    Date form completed by Physician:24    How was form returned to patient (mailed, faxed, or at  for patient to ):faxed    Date form mailed/faxed/left at  for patient and sent to HIM for scannin24      Once form is left for patient, faxed, or mailed PCS will then close the documentation only encounter.

## 2024-06-18 ENCOUNTER — MEDICAL CORRESPONDENCE (OUTPATIENT)
Dept: HEALTH INFORMATION MANAGEMENT | Facility: CLINIC | Age: 64
End: 2024-06-18
Payer: COMMERCIAL

## 2024-06-19 ENCOUNTER — TELEPHONE (OUTPATIENT)
Dept: FAMILY MEDICINE | Facility: CLINIC | Age: 64
End: 2024-06-19
Payer: COMMERCIAL

## 2024-06-19 NOTE — TELEPHONE ENCOUNTER
06/19/24    Spoke to Patient regarding excessive no-shows. Reviewed No-show policy and the importance of coming to their appointments. They have voiced understanding and have agreed to call or use their MyChart to cancel their appointments. They are aware if they continue to no-show appointments, they may only be eligible for same day appointments, if available.    Was MyChart offered to patient?  Yes, MyChart Activation code sent via text or email.     Was Text Reminders offered to patient?  Yes, Pt accepted text reminders.    Evens Pacheco on 6/19/2024 at 1:35 PM

## 2024-06-20 ENCOUNTER — OFFICE VISIT (OUTPATIENT)
Dept: FAMILY MEDICINE | Facility: CLINIC | Age: 64
End: 2024-06-20
Payer: COMMERCIAL

## 2024-06-20 VITALS
WEIGHT: 129 LBS | SYSTOLIC BLOOD PRESSURE: 167 MMHG | BODY MASS INDEX: 18.47 KG/M2 | TEMPERATURE: 97 F | RESPIRATION RATE: 20 BRPM | HEIGHT: 70 IN | OXYGEN SATURATION: 100 % | HEART RATE: 69 BPM | DIASTOLIC BLOOD PRESSURE: 83 MMHG

## 2024-06-20 DIAGNOSIS — Z01.818 PREOP GENERAL PHYSICAL EXAM: ICD-10-CM

## 2024-06-20 DIAGNOSIS — D64.9 ANEMIA, UNSPECIFIED TYPE: ICD-10-CM

## 2024-06-20 DIAGNOSIS — I10 BENIGN ESSENTIAL HYPERTENSION: Primary | ICD-10-CM

## 2024-06-20 LAB
ERYTHROCYTE [DISTWIDTH] IN BLOOD BY AUTOMATED COUNT: 14.8 % (ref 10–15)
HCT VFR BLD AUTO: 29.7 % (ref 40–53)
HGB BLD-MCNC: 9 G/DL (ref 13.3–17.7)
MCH RBC QN AUTO: 27.4 PG (ref 26.5–33)
MCHC RBC AUTO-ENTMCNC: 30.3 G/DL (ref 31.5–36.5)
MCV RBC AUTO: 90 FL (ref 78–100)
PLATELET # BLD AUTO: 444 10E3/UL (ref 150–450)
RBC # BLD AUTO: 3.29 10E6/UL (ref 4.4–5.9)
WBC # BLD AUTO: 7.1 10E3/UL (ref 4–11)

## 2024-06-20 PROCEDURE — 99214 OFFICE O/P EST MOD 30 MIN: CPT | Mod: GC

## 2024-06-20 PROCEDURE — 85027 COMPLETE CBC AUTOMATED: CPT

## 2024-06-20 PROCEDURE — 36415 COLL VENOUS BLD VENIPUNCTURE: CPT

## 2024-06-20 PROCEDURE — 80048 BASIC METABOLIC PNL TOTAL CA: CPT

## 2024-06-20 RX ORDER — AMLODIPINE BESYLATE 5 MG/1
5 TABLET ORAL DAILY
Qty: 60 TABLET | Refills: 1 | Status: SHIPPED | OUTPATIENT
Start: 2024-06-20

## 2024-06-20 NOTE — PATIENT INSTRUCTIONS
Nice to see you today! Here is a list of what we discussed:  Cleared for surgery  2.   High BP start amlodipine 5 mg daily  3.   Take BP at home twice daily  4.    Go to ED if you have stroke symptoms such as HA, CP, weakness, passing out, loss of balance  5.   Return in 1 month     If you have any questions or need further assistance, please call the clinic at [phone] or send me a [place] message.  Thank you for trusting me with your care.    Abelardo Elizabeth MD   [facility]

## 2024-06-20 NOTE — PROGRESS NOTES
Critical Vital Report    Did patient have a critical vital during today's visit? Yes  Which vital is reporting as critical?: Blood Pressure      I personally notified the following: Dr. Elizabeth  Provider  Preceptor  RN (Type name here)    Action(s) taken: I left room and notified provider personally  I stayed in room with patient  A coworker (PCS or RN) stayed in room with patient while I notified provider  I called some else to assist me
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
## 2024-06-20 NOTE — PROGRESS NOTES
"Preceptor Attestation:  Vitals:    06/20/24 1420 06/20/24 1427   BP: (!) 162/81 (!) 167/83   BP Location: Right arm    Patient Position: Sitting    Cuff Size: Adult Regular    Pulse: 69    Resp: 20    Temp: 97  F (36.1  C)    TempSrc: Tympanic    SpO2: 100%    Weight: 58.5 kg (129 lb)    Height: 1.778 m (5' 10\")           I discussed the patient with the resident and evaluated the patient in person. I have verified the content of the note, which accurately reflects my assessment of the patient and the plan of care.   Supervising Physician:  Yun Quintero MD    "

## 2024-06-21 LAB
ANION GAP SERPL CALCULATED.3IONS-SCNC: 9 MMOL/L (ref 7–15)
BUN SERPL-MCNC: 20.7 MG/DL (ref 8–23)
CALCIUM SERPL-MCNC: 9.1 MG/DL (ref 8.8–10.2)
CHLORIDE SERPL-SCNC: 108 MMOL/L (ref 98–107)
CREAT SERPL-MCNC: 1.71 MG/DL (ref 0.67–1.17)
DEPRECATED HCO3 PLAS-SCNC: 21 MMOL/L (ref 22–29)
EGFRCR SERPLBLD CKD-EPI 2021: 44 ML/MIN/1.73M2
GLUCOSE SERPL-MCNC: 99 MG/DL (ref 70–99)
POTASSIUM SERPL-SCNC: 5.1 MMOL/L (ref 3.4–5.3)
SODIUM SERPL-SCNC: 138 MMOL/L (ref 135–145)

## 2024-06-25 ENCOUNTER — MEDICAL CORRESPONDENCE (OUTPATIENT)
Dept: HEALTH INFORMATION MANAGEMENT | Facility: CLINIC | Age: 64
End: 2024-06-25
Payer: COMMERCIAL

## 2024-06-27 DIAGNOSIS — G62.9 NEUROPATHY: ICD-10-CM

## 2024-06-28 RX ORDER — GABAPENTIN 300 MG/1
300 CAPSULE ORAL 2 TIMES DAILY
Qty: 60 CAPSULE | Refills: 1 | Status: SHIPPED | OUTPATIENT
Start: 2024-06-28 | End: 2024-08-31

## 2024-07-11 ENCOUNTER — TRANSFERRED RECORDS (OUTPATIENT)
Dept: HEALTH INFORMATION MANAGEMENT | Facility: CLINIC | Age: 64
End: 2024-07-11
Payer: COMMERCIAL

## 2024-07-19 ENCOUNTER — OFFICE VISIT (OUTPATIENT)
Dept: FAMILY MEDICINE | Facility: CLINIC | Age: 64
End: 2024-07-19
Payer: COMMERCIAL

## 2024-07-19 VITALS
DIASTOLIC BLOOD PRESSURE: 73 MMHG | BODY MASS INDEX: 18.87 KG/M2 | RESPIRATION RATE: 12 BRPM | HEART RATE: 109 BPM | WEIGHT: 131.8 LBS | HEIGHT: 70 IN | OXYGEN SATURATION: 97 % | SYSTOLIC BLOOD PRESSURE: 127 MMHG | TEMPERATURE: 98.3 F

## 2024-07-19 DIAGNOSIS — N18.31 STAGE 3A CHRONIC KIDNEY DISEASE (H): ICD-10-CM

## 2024-07-19 DIAGNOSIS — Z01.818 PREOP GENERAL PHYSICAL EXAM: Primary | ICD-10-CM

## 2024-07-19 DIAGNOSIS — I10 BENIGN ESSENTIAL HYPERTENSION: ICD-10-CM

## 2024-07-19 PROCEDURE — 99214 OFFICE O/P EST MOD 30 MIN: CPT | Mod: GC

## 2024-07-19 NOTE — PATIENT INSTRUCTIONS
How to Take Your Medication Before Surgery  Preoperative Medication Instructions   Antiplatelet or Anticoagulation Medication Instructions   - Bleeding risk is low for this procedure (e.g. dental, skin, cataract).   - aspirin: Bleeding risk is low for this procedure and daily aspirin may be continued without modification.     Additional Medication Instructions  Take all scheduled medications on the day of surgery       Patient Education   Preparing for Your Surgery  Getting started  A nurse will call you to review your health history and instructions. They will give you an arrival time based on your scheduled surgery time. Please be ready to share:  Your doctor's clinic name and phone number  Your medical, surgical, and anesthesia history  A list of allergies and sensitivities  A list of medicines, including herbal treatments and over-the-counter drugs  Whether the patient has a legal guardian (ask how to send us the papers in advance)  Please tell us if you're pregnant--or if there's any chance you might be pregnant. Some surgeries may injure a fetus (unborn baby), so they require a pregnancy test. Surgeries that are safe for a fetus don't always need a test, and you can choose whether to have one.   If you have a child who's having surgery, please ask for a copy of Preparing for Your Child's Surgery.    Preparing for surgery  Within 10 to 30 days of surgery: Have a pre-op exam (sometimes called an H&P, or History and Physical). This can be done at a clinic or pre-operative center.  If you're having a , you may not need this exam. Talk to your care team.  At your pre-op exam, talk to your care team about all medicines you take. If you need to stop any medicines before surgery, ask when to start taking them again.  We do this for your safety. Many medicines can make you bleed too much during surgery. Some change how well surgery (anesthesia) drugs work.  Call your insurance company to let them know you're  having surgery. (If you don't have insurance, call [phone].)  Call your clinic if there's any change in your health. This includes signs of a cold or flu (sore throat, runny nose, cough, rash, fever). It also includes a scrape or scratch near the surgery site.  If you have questions on the day of surgery, call your hospital or surgery center.  Eating and drinking guidelines  For your safety: Unless your surgeon tells you otherwise, follow the guidelines below.  Eat and drink as usual until 8 hours before you arrive for surgery. After that, no food or milk.  Drink clear liquids until 2 hours before you arrive. These are liquids you can see through, like water, Gatorade, and Propel Water. They also include plain black coffee and tea (no cream or milk), candy, and breath mints. You can spit out gum when you arrive.  If you drink alcohol: Stop drinking it the night before surgery.  If your care team tells you to take medicine on the morning of surgery, it's okay to take it with a sip of water.  Preventing infection  Shower or bathe the night before and morning of your surgery. Follow the instructions your clinic gave you. (If no instructions, use regular soap.)  Don't shave or clip hair near your surgery site. We'll remove the hair if needed.  Don't smoke or vape the morning of surgery. You may chew nicotine gum up to 2 hours before surgery. A nicotine patch is okay.  Note: Some surgeries require you to completely quit smoking and nicotine. Check with your surgeon.  Your care team will make every effort to keep you safe from infection. We will:  Clean our hands often with soap and water (or an alcohol-based hand rub).  Clean the skin at your surgery site with a special soap that kills germs.  Give you a special gown to keep you warm. (Cold raises the risk of infection.)  Wear special hair covers, masks, gowns and gloves during surgery.  Give antibiotic medicine, if prescribed. Not all surgeries need  antibiotics.  What to bring on the day of surgery  Photo ID and insurance card  Copy of your health care directive, if you have one  Glasses and hearing aids (bring cases)  You can't wear contacts during surgery  Inhaler and eye drops, if you use them (tell us about these when you arrive)  CPAP machine or breathing device, if you use them  A few personal items, if spending the night  If you have . . .  A pacemaker, ICD (cardiac defibrillator) or other implant: Bring the ID card.  An implanted stimulator: Bring the remote control.  A legal guardian: Bring a copy of the certified (court-stamped) guardianship papers.  Please remove any jewelry, including body piercings. Leave jewelry and other valuables at home.  If you're going home the day of surgery  You must have a responsible adult drive you home. They should stay with you overnight as well.  If you don't have someone to stay with you, and you aren't safe to go home alone, we may keep you overnight. Insurance often won't pay for this.  After surgery  If it's hard to control your pain or you need more pain medicine, please call your surgeon's office.  Questions?   If you have any questions for your care team, list them here: _________________________________________________________________________________________________________________________________________________________________________ ____________________________________ ____________________________________ ____________________________________  For informational purposes only. Not to replace the advice of your health care provider. Copyright   2003, 2019 [facility]. All rights reserved. Clinically reviewed by Claudia Mederos MD. [employer] [id number] - REV 12/22.
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
## 2024-07-19 NOTE — PROGRESS NOTES
Preceptor Attestation:    I discussed the patient with the resident and evaluated the patient in person. I have verified the content of the note, which accurately reflects my assessment of the patient and the plan of care.   Supervising Physician:  Cinthia Guzman MD

## 2024-07-22 ENCOUNTER — TELEPHONE (OUTPATIENT)
Dept: FAMILY MEDICINE | Facility: CLINIC | Age: 64
End: 2024-07-22
Payer: COMMERCIAL

## 2024-07-22 NOTE — TELEPHONE ENCOUNTER
Forms/Letter Request    Type of form/letter: Disability      Do we have the form/letter: Yes:     Who is the form from? Patient    Where did/will the form come from? Patient or family brought in       When is form/letter needed by: asap    How would you like the form/letter returned: Fax : [phone]  daughter would like forms faxed and then would like us to call her and she will  original    Patient Notified form requests are processed in 5-7 business days:Yes    Okay to leave a detailed message?: Yes at Other phone number:  Jackelyn  @ [phone]

## 2024-07-28 DIAGNOSIS — N18.31 CHRONIC KIDNEY DISEASE (CKD) STAGE G3A/A1, MODERATELY DECREASED GLOMERULAR FILTRATION RATE (GFR) BETWEEN 45-59 ML/MIN/1.73 SQUARE METER AND ALBUMINURIA CREATININE RATIO LESS THAN 30 MG/G (H): Primary | ICD-10-CM

## 2024-08-02 ENCOUNTER — TRANSFERRED RECORDS (OUTPATIENT)
Dept: HEALTH INFORMATION MANAGEMENT | Facility: CLINIC | Age: 64
End: 2024-08-02
Payer: COMMERCIAL

## 2024-08-05 ENCOUNTER — DOCUMENTATION ONLY (OUTPATIENT)
Dept: FAMILY MEDICINE | Facility: CLINIC | Age: 64
End: 2024-08-05
Payer: COMMERCIAL

## 2024-08-05 NOTE — PROGRESS NOTES
To be completed in Nursing note:    Please reference list for forms that require a visit for completion.  Please remind patients that providers are given 3-5 business days to complete and return forms.      Form type:Singularu    Date form received: 24    Date form completed by Physician: 24    How was form returned to patient (mailed, faxed, or at  for patient to ):faxed    Date form mailed/faxed/left at  for patient and sent to HIM for scannin24    Once form is left for patient, faxed, or mailed PCS will then close the documentation only encounter.

## 2024-08-12 ENCOUNTER — DOCUMENTATION ONLY (OUTPATIENT)
Dept: FAMILY MEDICINE | Facility: CLINIC | Age: 64
End: 2024-08-12
Payer: COMMERCIAL

## 2024-08-12 NOTE — PROGRESS NOTES
To be completed in Nursing note:    Please reference list for forms that require a visit for completion.  Please remind patients that providers are given 3-5 business days to complete and return forms.      Form type: [place] Certification    Date form received: 24    Date form completed by Physician:24    How was form returned to patient (mailed, faxed, or at  for patient to ):faxed    Date form mailed/faxed/left at  for patient and sent to HIM for scannin24      Once form is left for patient, faxed, or mailed PCS will then close the documentation only encounter.

## 2024-08-15 DIAGNOSIS — N18.31 STAGE 3A CHRONIC KIDNEY DISEASE (H): ICD-10-CM

## 2024-08-19 RX ORDER — EMPAGLIFLOZIN 10 MG/1
10 TABLET, FILM COATED ORAL DAILY
Qty: 90 TABLET | Refills: 0 | Status: SHIPPED | OUTPATIENT
Start: 2024-08-19

## 2024-08-19 NOTE — TELEPHONE ENCOUNTER
Name from pharmacy: JARDIANCE 10MG TABLETS          Will file in chart as: JARDIANCE 10 MG TABS tablet    Sig: TAKE 1 TABLET(10 MG) BY MOUTH DAILY    Disp: 90 tablet    Refills: 0 (Pharmacy requested: Not specified)    Start: 8/15/2024    Class: E-Prescribe    Non-formulary For: Stage 3a chronic kidney disease (H)    Last ordered: 3 months ago (5/10/2024) by Charisse Smith MD    Last refill: 7/12/2024    Rx #: [id number]    Sodium Glucose Co-Transport Inhibitor Agents Kwawbg89/15/2024 04:27 PM   Protocol Details Has GFR on file in past 12 months and most recent value is normal        MIKE Lewis, BSN

## 2024-08-31 DIAGNOSIS — N18.31 STAGE 3A CHRONIC KIDNEY DISEASE (H): ICD-10-CM

## 2024-08-31 DIAGNOSIS — G62.9 NEUROPATHY: ICD-10-CM

## 2024-08-31 RX ORDER — SODIUM BICARBONATE 650 MG/1
TABLET ORAL
Qty: 60 TABLET | Refills: 3 | Status: SHIPPED | OUTPATIENT
Start: 2024-08-31

## 2024-08-31 RX ORDER — GABAPENTIN 300 MG/1
300 CAPSULE ORAL 2 TIMES DAILY
Qty: 60 CAPSULE | Refills: 1 | Status: SHIPPED | OUTPATIENT
Start: 2024-08-31

## 2024-09-17 ENCOUNTER — TRANSFERRED RECORDS (OUTPATIENT)
Dept: HEALTH INFORMATION MANAGEMENT | Facility: CLINIC | Age: 64
End: 2024-09-17
Payer: COMMERCIAL

## 2024-09-24 NOTE — CONFIDENTIAL NOTE
To be completed in Nursing note:    Please reference list for forms that require a visit for completion.  Please remind patients that providers are given 3-5 business days to complete and return forms.      Form type: Home Health Certification and Plan of Care    Date form received: 9/23/24    Date form completed by Physician:    How was form returned to patient (mailed, faxed, or at  for patient to ):    Date form mailed/faxed/left at  for patient and sent to HIM for scanning:      Once form is left for patient, faxed, or mailed PCS will then close the documentation only encounter.

## 2024-09-26 DIAGNOSIS — Z53.9 DIAGNOSIS NOT YET DEFINED: Primary | ICD-10-CM

## 2024-09-26 PROCEDURE — G0179 MD RECERTIFICATION HHA PT: HCPCS

## 2024-10-04 ENCOUNTER — OFFICE VISIT (OUTPATIENT)
Dept: FAMILY MEDICINE | Facility: CLINIC | Age: 64
End: 2024-10-04
Payer: COMMERCIAL

## 2024-10-04 VITALS
DIASTOLIC BLOOD PRESSURE: 88 MMHG | WEIGHT: 131 LBS | HEART RATE: 99 BPM | SYSTOLIC BLOOD PRESSURE: 148 MMHG | RESPIRATION RATE: 20 BRPM | OXYGEN SATURATION: 100 % | TEMPERATURE: 98.3 F | HEIGHT: 70 IN | BODY MASS INDEX: 18.75 KG/M2

## 2024-10-04 DIAGNOSIS — I10 PRIMARY HYPERTENSION: ICD-10-CM

## 2024-10-04 DIAGNOSIS — R22.1 LOCALIZED SWELLING, MASS AND LUMP, NECK: Primary | ICD-10-CM

## 2024-10-04 PROCEDURE — 99214 OFFICE O/P EST MOD 30 MIN: CPT | Mod: GC

## 2024-10-04 NOTE — PATIENT INSTRUCTIONS
Seek medical care if you develop:  Increased pain, swelling, warmth, or redness.  Red streaks leading from the infected skin.  Pus draining from the wound.  A fever.
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
## 2024-10-18 DIAGNOSIS — N18.31 CHRONIC KIDNEY DISEASE, STAGE 3A (H): Primary | ICD-10-CM

## 2024-10-18 DIAGNOSIS — I10 BENIGN ESSENTIAL HYPERTENSION: ICD-10-CM

## 2024-10-18 RX ORDER — AMLODIPINE BESYLATE 5 MG/1
5 TABLET ORAL DAILY
Qty: 60 TABLET | Refills: 1 | Status: SHIPPED | OUTPATIENT
Start: 2024-10-18

## 2024-10-18 NOTE — TELEPHONE ENCOUNTER
Name from pharmacy: AMLODIPINE BESYLATE 5MG TABLETS          Will file in chart as: amLODIPine (NORVASC) 5 MG tablet    Sig: TAKE 1 TABLET(5 MG) BY MOUTH DAILY    Disp: 60 tablet    Refills: 1 (Pharmacy requested: Not specified)    Start: 10/18/2024    Class: E-Prescribe    Non-formulary For: Benign essential hypertension    Last ordered: 4 months ago (6/20/2024) by Yun Quintero MD    Last refill: 8/15/2024    Rx #: [id number]    Calcium Channel Blockers Protocol  Zidoou38/18/2024 12:21 PM   Protocol Details Most recent blood pressure under 140/90 in past 12 months    GFR is on file in the past 12 months and most recent GFR is normal        Wilbert Anguiano RN, MSN

## 2024-10-26 DIAGNOSIS — G62.9 NEUROPATHY: ICD-10-CM

## 2024-10-26 RX ORDER — GABAPENTIN 300 MG/1
300 CAPSULE ORAL 2 TIMES DAILY
Qty: 60 CAPSULE | Refills: 1 | Status: SHIPPED | OUTPATIENT
Start: 2024-10-26

## 2024-10-30 ENCOUNTER — TRANSFERRED RECORDS (OUTPATIENT)
Dept: HEALTH INFORMATION MANAGEMENT | Facility: CLINIC | Age: 64
End: 2024-10-30
Payer: COMMERCIAL

## 2024-11-07 ENCOUNTER — LAB (OUTPATIENT)
Dept: LAB | Facility: CLINIC | Age: 64
End: 2024-11-07
Payer: COMMERCIAL

## 2024-11-07 DIAGNOSIS — N18.31 CHRONIC KIDNEY DISEASE, STAGE 3A (H): ICD-10-CM

## 2024-11-07 LAB
ALBUMIN MFR UR ELPH: 131 MG/DL
ALBUMIN SERPL BCG-MCNC: 3.8 G/DL (ref 3.5–5.2)
ALBUMIN UR-MCNC: 100 MG/DL
ANION GAP SERPL CALCULATED.3IONS-SCNC: 11 MMOL/L (ref 7–15)
APPEARANCE UR: CLEAR
BILIRUB UR QL STRIP: NEGATIVE
BUN SERPL-MCNC: 36.6 MG/DL (ref 8–23)
CALCIUM SERPL-MCNC: 8.5 MG/DL (ref 8.8–10.4)
CHLORIDE SERPL-SCNC: 114 MMOL/L (ref 98–107)
COLOR UR AUTO: YELLOW
CREAT SERPL-MCNC: 2.26 MG/DL (ref 0.67–1.17)
CREAT UR-MCNC: 254 MG/DL
EGFRCR SERPLBLD CKD-EPI 2021: 31 ML/MIN/1.73M2
ERYTHROCYTE [DISTWIDTH] IN BLOOD BY AUTOMATED COUNT: 17.4 % (ref 10–15)
GLUCOSE SERPL-MCNC: 92 MG/DL (ref 70–99)
GLUCOSE UR STRIP-MCNC: 500 MG/DL
HCO3 SERPL-SCNC: 15 MMOL/L (ref 22–29)
HCT VFR BLD AUTO: 37.6 % (ref 40–53)
HGB BLD-MCNC: 11.9 G/DL (ref 13.3–17.7)
HGB UR QL STRIP: NEGATIVE
KETONES UR STRIP-MCNC: NEGATIVE MG/DL
LEUKOCYTE ESTERASE UR QL STRIP: NEGATIVE
MCH RBC QN AUTO: 27.4 PG (ref 26.5–33)
MCHC RBC AUTO-ENTMCNC: 31.6 G/DL (ref 31.5–36.5)
MCV RBC AUTO: 86 FL (ref 78–100)
NITRATE UR QL: NEGATIVE
PH UR STRIP: 5.5 [PH] (ref 5–8)
PHOSPHATE SERPL-MCNC: 3.3 MG/DL (ref 2.5–4.5)
PLATELET # BLD AUTO: 403 10E3/UL (ref 150–450)
POTASSIUM SERPL-SCNC: 5.2 MMOL/L (ref 3.4–5.3)
PROT/CREAT 24H UR: 0.52 MG/MG CR (ref 0–0.2)
PTH-INTACT SERPL-MCNC: 74 PG/ML (ref 15–65)
RBC # BLD AUTO: 4.35 10E6/UL (ref 4.4–5.9)
SODIUM SERPL-SCNC: 140 MMOL/L (ref 135–145)
SP GR UR STRIP: 1.02 (ref 1–1.03)
UROBILINOGEN UR STRIP-ACNC: 0.2 E.U./DL
WBC # BLD AUTO: 7.6 10E3/UL (ref 4–11)

## 2024-11-07 PROCEDURE — 83970 ASSAY OF PARATHORMONE: CPT

## 2024-11-07 PROCEDURE — 81003 URINALYSIS AUTO W/O SCOPE: CPT

## 2024-11-07 PROCEDURE — 36415 COLL VENOUS BLD VENIPUNCTURE: CPT

## 2024-11-07 PROCEDURE — 84156 ASSAY OF PROTEIN URINE: CPT

## 2024-11-07 PROCEDURE — 82306 VITAMIN D 25 HYDROXY: CPT

## 2024-11-07 PROCEDURE — 85027 COMPLETE CBC AUTOMATED: CPT

## 2024-11-07 PROCEDURE — 80069 RENAL FUNCTION PANEL: CPT

## 2024-11-08 ENCOUNTER — TRANSFERRED RECORDS (OUTPATIENT)
Dept: HEALTH INFORMATION MANAGEMENT | Facility: CLINIC | Age: 64
End: 2024-11-08
Payer: COMMERCIAL

## 2024-11-10 LAB
DEPRECATED CALCIDIOL+CALCIFEROL SERPL-MC: <49 UG/L (ref 20–75)
VITAMIN D2 SERPL-MCNC: <5 UG/L
VITAMIN D3 SERPL-MCNC: 44 UG/L

## 2024-11-19 DIAGNOSIS — N18.31 STAGE 3A CHRONIC KIDNEY DISEASE (H): ICD-10-CM

## 2024-11-19 RX ORDER — EMPAGLIFLOZIN 10 MG/1
10 TABLET, FILM COATED ORAL DAILY
Qty: 90 TABLET | Refills: 0 | Status: SHIPPED | OUTPATIENT
Start: 2024-11-19

## 2024-11-19 NOTE — TELEPHONE ENCOUNTER
Name from pharmacy: JARDIANCE 10MG TABLETS         Will file in chart as: JARDIANCE 10 MG TABS tablet    Sig: TAKE 1 TABLET(10 MG) BY MOUTH DAILY    Disp: 90 tablet    Refills: 0 (Pharmacy requested: Not specified)    Start: 11/19/2024    Class: E-Prescribe    Non-formulary For: Stage 3a chronic kidney disease (H)    Last ordered: 3 months ago (8/19/2024) by Charisse Smith MD    Last refill: 10/19/2024    Rx #: [id number]    Sodium Glucose Co-Transport Inhibitor Agents Crplbz3611/19/2024 09:08 AM   Protocol Details Patient has documented A1c within the specified period of time.

## 2024-12-18 ENCOUNTER — DOCUMENTATION ONLY (OUTPATIENT)
Dept: FAMILY MEDICINE | Facility: CLINIC | Age: 64
End: 2024-12-18
Payer: COMMERCIAL

## 2024-12-18 NOTE — CONFIDENTIAL NOTE
To be completed in Nursing note:    Please reference list for forms that require a visit for completion.  Please remind patients that providers are given 3-5 business days to complete and return forms.      Form type: Home Health Care Certification and plan of care    Date form received: 12/10/24    Date form completed by Physician:24    How was form returned to patient (mailed, faxed, or at  for patient to ):faxed    Date form mailed/faxed/left at  for patient and sent to HIM for [email or web address]      Once form is left for patient, faxed, or mailed PCS will then close the documentation only encounter.

## (undated) DEVICE — SU VICRYL 3-0 SH 8X18" UND J864D

## (undated) DEVICE — SOL NACL 0.9% IRRIG 1000ML BOTTLE 2F7124

## (undated) DEVICE — BUR STRK EGG 4.0X44.5MM 10 FLUTE 1607-002-035

## (undated) DEVICE — SU VICRYL 3-0 SH 27" UND J416H

## (undated) DEVICE — CLIP HORIZON SM RED WIDE SLOT 001201

## (undated) DEVICE — SU SILK 2-0 TIE 12X30" A305H

## (undated) DEVICE — SU PROLENE 4-0 SHDA 36" 8521H

## (undated) DEVICE — DRSG TELFA 3X8" 1238

## (undated) DEVICE — BIT DRL 125MM 1.5MM MATRIXMANDIBLE JLTCH CPLNG CLBRT STRYK

## (undated) DEVICE — GLOVE BIOGEL PI SZ 8.5 40885

## (undated) DEVICE — SOL WATER IRRIG 1000ML BOTTLE 2F7114

## (undated) DEVICE — SYRINGE EAR/ULCER STERILE 2 OZ BULB 4172

## (undated) DEVICE — BRUSH SURGICAL EZ SCRUB PLAIN 371603

## (undated) DEVICE — TEST TUBE W/SCREW CAP 17361

## (undated) DEVICE — DRSG PRIMAPORE 03 1/8X6" 66000318

## (undated) DEVICE — TOOTHBRUSH ADULT NON STERILE MDS136850

## (undated) DEVICE — PAD CHUX UNDERPAD 23X24" 7136

## (undated) DEVICE — SU CHROMIC 3-0 FS-2 27" 636

## (undated) DEVICE — LINEN TOWEL PACK X6 WHITE 5487

## (undated) DEVICE — ESU NDL COLORADO MICRO E1651

## (undated) DEVICE — LINEN TOWEL PACK X5 5464

## (undated) DEVICE — SUCTION MANIFOLD NEPTUNE 2 SYS 4 PORT 0702-020-000

## (undated) DEVICE — PREP POVIDONE-IODINE 10% SOLUTION 4OZ BOTTLE MDS093944

## (undated) DEVICE — PREP POVIDONE-IODINE 7.5% SCRUB 4OZ BOTTLE MDS093945

## (undated) DEVICE — OINTMENT ANTIBIOTIC BACITRACIN ZINC .9 G 1171

## (undated) DEVICE — Device

## (undated) DEVICE — RX VISTASEAL FIBRIN SEALANT W/THROMBIN 10ML VST10

## (undated) DEVICE — PACK NEURO MINOR UMMC SNE32MNMU4

## (undated) DEVICE — SU SILK 3-0 TIE 12X30" A304H

## (undated) DEVICE — STIMULATOR NERVE BIPHASIC WAVEFORM HEAD & NECK 9394

## (undated) DEVICE — ESU GROUND PAD ADULT W/CORD E7507

## (undated) RX ORDER — CEFAZOLIN SODIUM/WATER 2 G/20 ML
SYRINGE (ML) INTRAVENOUS
Status: DISPENSED
Start: 2024-04-16

## (undated) RX ORDER — ACETAMINOPHEN 325 MG/1
TABLET ORAL
Status: DISPENSED
Start: 2024-04-16

## (undated) RX ORDER — HYDROMORPHONE HYDROCHLORIDE 1 MG/ML
INJECTION, SOLUTION INTRAMUSCULAR; INTRAVENOUS; SUBCUTANEOUS
Status: DISPENSED
Start: 2024-04-16

## (undated) RX ORDER — FENTANYL CITRATE 50 UG/ML
INJECTION, SOLUTION INTRAMUSCULAR; INTRAVENOUS
Status: DISPENSED
Start: 2024-04-16

## (undated) RX ORDER — CHLORHEXIDINE GLUCONATE ORAL RINSE 1.2 MG/ML
SOLUTION DENTAL
Status: DISPENSED
Start: 2024-04-16

## (undated) RX ORDER — HYDROMORPHONE HCL IN WATER/PF 6 MG/30 ML
PATIENT CONTROLLED ANALGESIA SYRINGE INTRAVENOUS
Status: DISPENSED
Start: 2024-04-16

## (undated) RX ORDER — LIDOCAINE HYDROCHLORIDE 10 MG/ML
INJECTION, SOLUTION EPIDURAL; INFILTRATION; INTRACAUDAL; PERINEURAL
Status: DISPENSED
Start: 2024-04-16

## (undated) RX ORDER — OXYCODONE HYDROCHLORIDE 5 MG/1
TABLET ORAL
Status: DISPENSED
Start: 2024-04-16

## (undated) RX ORDER — BUPIVACAINE HYDROCHLORIDE AND EPINEPHRINE 2.5; 5 MG/ML; UG/ML
INJECTION, SOLUTION EPIDURAL; INFILTRATION; INTRACAUDAL; PERINEURAL
Status: DISPENSED
Start: 2024-04-16